# Patient Record
Sex: FEMALE | Race: WHITE | HISPANIC OR LATINO | Employment: OTHER | URBAN - METROPOLITAN AREA
[De-identification: names, ages, dates, MRNs, and addresses within clinical notes are randomized per-mention and may not be internally consistent; named-entity substitution may affect disease eponyms.]

---

## 2017-04-05 RX ORDER — LEVOTHYROXINE SODIUM 0.05 MG/1
TABLET ORAL
Qty: 90 TAB | Refills: 1 | Status: SHIPPED | OUTPATIENT
Start: 2017-04-05 | End: 2017-09-26 | Stop reason: SDUPTHER

## 2017-04-05 NOTE — TELEPHONE ENCOUNTER
Was the patient seen in the last year in this department? Yes     Does patient have an active prescription for medications requested? No     Received Request Via: Pharmacy      Pt met protocol?: Yes pt last ov 10/2016 last TSH 8/2016

## 2017-04-13 ENCOUNTER — HOSPITAL ENCOUNTER (OUTPATIENT)
Dept: LAB | Facility: MEDICAL CENTER | Age: 77
End: 2017-04-13
Attending: NURSE PRACTITIONER
Payer: MEDICARE

## 2017-04-13 ENCOUNTER — OFFICE VISIT (OUTPATIENT)
Dept: MEDICAL GROUP | Facility: PHYSICIAN GROUP | Age: 77
End: 2017-04-13
Payer: MEDICARE

## 2017-04-13 VITALS
TEMPERATURE: 98.9 F | OXYGEN SATURATION: 96 % | BODY MASS INDEX: 41.43 KG/M2 | HEART RATE: 90 BPM | RESPIRATION RATE: 16 BRPM | WEIGHT: 211 LBS | SYSTOLIC BLOOD PRESSURE: 128 MMHG | HEIGHT: 60 IN | DIASTOLIC BLOOD PRESSURE: 84 MMHG

## 2017-04-13 DIAGNOSIS — E03.9 ACQUIRED HYPOTHYROIDISM: ICD-10-CM

## 2017-04-13 DIAGNOSIS — R87.619 ABNORMAL CERVICAL PAPANICOLAOU SMEAR, UNSPECIFIED ABNORMAL PAP FINDING: ICD-10-CM

## 2017-04-13 DIAGNOSIS — E87.1 HYPONATREMIA: ICD-10-CM

## 2017-04-13 DIAGNOSIS — Z23 NEED FOR VACCINATION: ICD-10-CM

## 2017-04-13 DIAGNOSIS — I10 ESSENTIAL HYPERTENSION: ICD-10-CM

## 2017-04-13 DIAGNOSIS — N18.30 CKD (CHRONIC KIDNEY DISEASE), STAGE III (HCC): ICD-10-CM

## 2017-04-13 DIAGNOSIS — E66.01 OBESITY, MORBID, BMI 40.0-49.9 (HCC): ICD-10-CM

## 2017-04-13 LAB — TSH SERPL DL<=0.005 MIU/L-ACNC: 2.01 UIU/ML (ref 0.3–3.7)

## 2017-04-13 PROCEDURE — 90736 HZV VACCINE LIVE SUBQ: CPT | Performed by: NURSE PRACTITIONER

## 2017-04-13 PROCEDURE — 90471 IMMUNIZATION ADMIN: CPT | Performed by: NURSE PRACTITIONER

## 2017-04-13 PROCEDURE — 1036F TOBACCO NON-USER: CPT | Performed by: NURSE PRACTITIONER

## 2017-04-13 PROCEDURE — 3014F SCREEN MAMMO DOC REV: CPT | Performed by: NURSE PRACTITIONER

## 2017-04-13 PROCEDURE — 4040F PNEUMOC VAC/ADMIN/RCVD: CPT | Performed by: NURSE PRACTITIONER

## 2017-04-13 PROCEDURE — 1101F PT FALLS ASSESS-DOCD LE1/YR: CPT | Performed by: NURSE PRACTITIONER

## 2017-04-13 PROCEDURE — G8419 CALC BMI OUT NRM PARAM NOF/U: HCPCS | Performed by: NURSE PRACTITIONER

## 2017-04-13 PROCEDURE — G8432 DEP SCR NOT DOC, RNG: HCPCS | Performed by: NURSE PRACTITIONER

## 2017-04-13 PROCEDURE — 36415 COLL VENOUS BLD VENIPUNCTURE: CPT

## 2017-04-13 PROCEDURE — 99214 OFFICE O/P EST MOD 30 MIN: CPT | Mod: 25 | Performed by: NURSE PRACTITIONER

## 2017-04-13 PROCEDURE — 84443 ASSAY THYROID STIM HORMONE: CPT

## 2017-04-13 RX ORDER — LOSARTAN POTASSIUM 100 MG/1
100 TABLET ORAL DAILY
Qty: 90 TAB | Refills: 1 | Status: SHIPPED | OUTPATIENT
Start: 2017-04-13 | End: 2017-11-30 | Stop reason: SDUPTHER

## 2017-04-13 NOTE — PROGRESS NOTES
Subjective:     Chief Complaint   Patient presents with   • Hypertension     follow up   • Chronic Care Management        Jammie Berry is a 76 y.o. female here today for evaluation and management of:    Abnormal Pap smear of cervix  Referred to gynecology and then Dr. Franco for further evaluation, no need for follow up. Waiting for consult notes from office.      Obesity, morbid, BMI 40.0-49.9 (CMS-[facility])  3/27/15 BMI 40.65, she was referred to nutrition services and has changed dietary habits and increased physical activity. Rides exercise bike 35 min in am and 15-20 in the afternoon. She stopped regular soda and beer. Rare dosa, every two weeks.  BMI 2.25.16 is 37.98  BMI 4.13.17 is 41.21      CKD (chronic kidney disease), stage III  Followed by Dr. Mae, avoids NSAIDs. Drinks water and Gatorade as advised.  Next appointment June 2017.      Essential hypertension  Diagnosed in 2014, managed with Losartan 100 mg daily.   Home monitoring daily, no elevated readings. Recent measurements: Low 112/68 - 126/82  No NSAID's    No symptoms low BP: light-headed, tunnel-vision, unusual fatigue.   No symptoms high BP: pounding headache, visual changes, palpitations, flushed face. No medicine adverse effects: unusual fatigue, slow heartbeat, cough.     Hyponatremia  She was drinking too much water, now drinking some water (16 oz) Gatorade, apple juice, coffee, and tea. Avoids adding salt to foods. Sodium level has returned to normal range and has remained in range recently. Sodium level in range now.  Followed by nephrology.        Acquired hypothyroidism  Diagnosed 2014. Managed with levothyroxine 50 mcg since.  Last TSH 1.24 in August 2016.  Taking medication alone first am.    No temperature intolerance. No change hair/skin quality, BMs.   No tremors, weakness.  No peripheral swelling.  No mood changes.           ROS   Denies HA, chest pain, shortness of breath, abdominal pain, bladder or bowel changes, lower extremity  edema.    Current medicines (including changes today)  Current Outpatient Prescriptions   Medication Sig Dispense Refill   • losartan (COZAAR) 100 MG Tab Take 1 Tab by mouth every day. 90 Tab 1   • levothyroxine (SYNTHROID) 50 MCG Tab TAKE 1 TABLET BY MOUTH EVERY DAY 90 Tab 1   • Probiotic Product (PROBIOTIC PO) Take  by mouth.     • Cholecalciferol (VITAMIN D-3 PO) Take 1,000 Units by mouth every day.     • therapeutic multivitamin-minerals (THERAGRAN-M) TABS Take 1 Tab by mouth every day.       No current facility-administered medications for this visit.       She  has a past medical history of Abnormal Pap smear of cervix; Other specified hypothyroidism (9/3/2015); CKD (chronic kidney disease), stage III (9/4/2015); Essential hypertension (9/4/2015); and Tuberculosis. She also has no past medical history of Headache(784.0), Ulcer (CMS-[facility]), or Urinary tract infection, site not specified.    Allergies Review of patient's allergies indicates no known allergies.    Current medications, problem list, allergies, past medical history, and tobacco use history reviewed in EPIC today.    Health maintenance reviewed and updated.    Objective:   Blood pressure 128/84, pulse 90, temperature 37.2 °C (98.9 °F), resp. rate 16, height 1.524 m (5'), weight 95.709 kg (211 lb), SpO2 96 %. Body mass index is 41.21 kg/(m^2).     Physical Exam   Constitutional: Alert, no acute distress. Pleasant and cooperative with the examination.  Skin:   Warm, dry, no rashes in visible areas.    Eyes:   Pupils equal, round. Conjunctiva and sclera clear,    Lids normal.  ENT:  Pinna normal.   Neck:   Supple, trachea midline.  Lungs:  Normal effort and respirations. Clear to auscultation bilaterally.  CV:  Regular rate and rhythm.  MS/Ext:  Steady gait, no edema.  Psych:  Eye contact is good, affect calm.    Assessment and Plan:   The following treatment plan was discussed    1. Essential hypertension  Stable. Continue current medicines. Monitor  labs regularly.        2. Acquired hypothyroidism  Stable. Continue current medicines. Monitor labs regularly.    lab visit today.   - TSH; Future    3. CKD (chronic kidney disease), stage III  Stable. Continue current medicines. Monitor labs regularly. Follow-up with specialist as directed.    4. Hyponatremia  Resolved, continue recommended fluids . Will continue to follow up with specialist.    5. Obesity, morbid, BMI 40.0-49.9 (CMS-[facility])    - Patient identified as having weight management issue.  Appropriate orders and counseling given.    6. Abnormal cervical Papanicolaou smear, unspecified abnormal pap finding  Records requested.    7. Need for vaccination  Given in office today.  - VARICELLA ZOSTER VACCINE SQ  The patient was counseled on the requirements, possible side effects, and future requirements of immunizations for today, including all of the components of combination vaccines.  The vaccinations were approved to be given. Immunizations given by MA after completion of vaccine screening checklist, under the direction of JAZMIN Ortiz.    Followup: Return in about 6 months (around 10/13/2017) for htn, thyroid.  As scheduled, sooner if symptoms don't resolve or with any new problems.         Reviewed side effects, risks, and benefits of medications prescribed today.  Advised to take all medications as instructed and report any side effects.   The patient voices understanding and agrees.  Report any new or worsening symptoms.  Have labs or other diagnostic studies prior to follow up.  Keep all appointments for any referrals given.      Please note this dictation was created using voice recognition software. Every reasonable attempt has been made to correct obvious errors, however there may be errors of grammar and possibly content that were not discovered before finalizing the note.

## 2017-04-13 NOTE — MR AVS SNAPSHOT
Jammie Berry   2017 10:15 AM   Office Visit   MRN: [id number]    Department:  [facility]   Dept Phone:  [phone]    Description:  Female : 1940   Provider:  NORA Hayden           Reason for Visit     Hypertension follow up    Chronic Care Management           Allergies as of 2017     No Known Allergies      You were diagnosed with     Essential hypertension   [7032143]       Acquired hypothyroidism   [7465562]       CKD (chronic kidney disease), stage III   [026016]       Hyponatremia   [230410]       Obesity, morbid, BMI 40.0-49.9 (CMS-[facility])   [334501]       Abnormal cervical Papanicolaou smear, unspecified abnormal pap finding   [5080095]       Need for vaccination   [351926]         Vital Signs     Blood Pressure Pulse Temperature Respirations Height Weight    128/84 mmHg 90 37.2 °C (98.9 °F) 16 1.524 m (5') 95.709 kg (211 lb)    Body Mass Index Oxygen Saturation Smoking Status             41.21 kg/m2 96% Former Smoker         Basic Information     Date Of Birth Sex Race Ethnicity Preferred Language    1940 Female Other  Origin (Khmer,Solomon Islander,Latvian,Manish, etc) English      Your appointments     2017  9:30 AM   Follow Up Visit with She Mae M.D.   [facility] ([address] [place] [address])    [facility] [address]  [place] [address] [phone]           You will be receiving a confirmation call a few days before your appointment from our automated call confirmation system.            Oct 16, 2017 10:15 AM   Established Patient with NORA Hayden   [facility]    [address], [address]  [place] [address] [phone]           You will be receiving a confirmation call a few days before your appointment from our automated call confirmation system.              Problem List              ICD-10-CM Priority Class Noted - Resolved    Abnormal Pap smear of cervix R87.619   7/2/2014 - Present    Obesity, morbid, BMI 40.0-49.9 (CMS-[facility]) E66.01 High  4/17/2015 - Present    Acquired hypothyroidism E03.9   9/3/2015 - Present    CKD (chronic kidney disease), stage III N18.3   9/4/2015 - Present    Essential hypertension I10   9/4/2015 - Present    Hyponatremia E87.1   9/4/2015 - Present    Vitamin D deficiency disease E55.9   2/11/2016 - Present      Health Maintenance        Date Due Completion Dates    MAMMOGRAM 7/14/2017 7/14/2016, 7/13/2015, 7/11/2014    BONE DENSITY 7/11/2019 7/11/2014    COLONOSCOPY 10/12/2025 10/12/2015    IMM DTaP/Tdap/Td Vaccine (2 - Td) 8/29/2026 8/29/2016            Current Immunizations     13-VALENT PCV PREVNAR 10/12/2015    Influenza Vaccine Adult HD 10/13/2016, 10/12/2015    Pneumococcal polysaccharide vaccine (PPSV-23) 10/13/2016    SHINGLES VACCINE 4/13/2017    Tdap Vaccine 8/29/2016      Below and/or attached are the medications your provider expects you to take. Review all of your home medications and newly ordered medications with your provider and/or pharmacist. Follow medication instructions as directed by your provider and/or pharmacist. Please keep your medication list with you and share with your provider. Update the information when medications are discontinued, doses are changed, or new medications (including over-the-counter products) are added; and carry medication information at all times in the event of emergency situations     Allergies:  No Known Allergies          Medications  Valid as of: April 13, 2017 - 11:03 AM    Generic Name Brand Name Tablet Size Instructions for use    Cholecalciferol   Take 1,000 Units by mouth every day.        Levothyroxine Sodium (Tab) SYNTHROID 50 MCG TAKE 1 TABLET BY MOUTH EVERY DAY        Losartan Potassium (Tab) COZAAR 100 MG Take 1 Tab by mouth every day.        Multiple Vitamins-Minerals (Tab) THERAGRAN-M  Take 1 Tab by mouth every day.        Probiotic Product   Take   by mouth.        .                 Medicines prescribed today were sent to:     [facility]/PHARMACY #[id number] - [place], [place] - [address]    [address] [place] [address]    Phone: [phone] Fax: [phone]    Open 24 Hours?: No      Medication refill instructions:       If your prescription bottle indicates you have medication refills left, it is not necessary to call your provider’s office. Please contact your pharmacy and they will refill your medication.    If your prescription bottle indicates you do not have any refills left, you may request refills at any time through one of the following ways: The online [employer] system (except Urgent Care), by calling your provider’s office, or by asking your pharmacy to contact your provider’s office with a refill request. Medication refills are processed only during regular business hours and may not be available until the next business day. Your provider may request additional information or to have a follow-up visit with you prior to refilling your medication.   *Please Note: Medication refills are assigned a new Rx number when refilled electronically. Your pharmacy may indicate that no refills were authorized even though a new prescription for the same medication is available at the pharmacy. Please request the medicine by name with the pharmacy before contacting your provider for a refill.        Your To Do List     Future Labs/Procedures Complete By Expires    TSH  As directed 4/14/2018         [employer] Status: Patient Declined

## 2017-04-13 NOTE — ASSESSMENT & PLAN NOTE
Diagnosed in 2014, managed with Losartan 100 mg daily.   Home monitoring daily, no elevated readings. Recent measurements: Low 112/68 - 126/82  No NSAID's    No symptoms low BP: light-headed, tunnel-vision, unusual fatigue.   No symptoms high BP: pounding headache, visual changes, palpitations, flushed face. No medicine adverse effects: unusual fatigue, slow heartbeat, cough.

## 2017-04-13 NOTE — ASSESSMENT & PLAN NOTE
She was drinking too much water, now drinking some water (16 oz) Gatorade, apple juice, coffee, and tea. Avoids adding salt to foods. Sodium level has returned to normal range and has remained in range recently. Sodium level in range now.  Followed by nephrology.

## 2017-04-13 NOTE — ASSESSMENT & PLAN NOTE
Referred to gynecology and then Dr. Franco for further evaluation, no need for follow up. Waiting for consult notes from office.

## 2017-04-13 NOTE — ASSESSMENT & PLAN NOTE
Followed by Dr. Mae, avoids NSAIDs. Drinks water and Gatorade as advised.  Next appointment June 2017.

## 2017-04-13 NOTE — ASSESSMENT & PLAN NOTE
3/27/15 BMI 40.65, she was referred to nutrition services and has changed dietary habits and increased physical activity. Rides exercise bike 35 min in am and 15-20 in the afternoon. She stopped regular soda and beer. Rare dosa, every two weeks.  BMI 2.25.16 is 37.98  BMI 4.13.17 is 41.21

## 2017-04-13 NOTE — ASSESSMENT & PLAN NOTE
Diagnosed 2014. Managed with levothyroxine 50 mcg since.  Last TSH 1.24 in August 2016.  Taking medication alone first am.    No temperature intolerance. No change hair/skin quality, BMs.   No tremors, weakness.  No peripheral swelling.  No mood changes.

## 2017-06-12 ENCOUNTER — HOSPITAL ENCOUNTER (OUTPATIENT)
Dept: LAB | Facility: MEDICAL CENTER | Age: 77
End: 2017-06-12
Attending: INTERNAL MEDICINE
Payer: MEDICARE

## 2017-06-12 DIAGNOSIS — E87.1 HYPONATREMIA: ICD-10-CM

## 2017-06-12 DIAGNOSIS — D64.9 ANEMIA, UNSPECIFIED TYPE: ICD-10-CM

## 2017-06-12 DIAGNOSIS — N18.30 CKD (CHRONIC KIDNEY DISEASE), STAGE III (HCC): ICD-10-CM

## 2017-06-12 LAB
25(OH)D3 SERPL-MCNC: 37 NG/ML (ref 30–100)
ANION GAP SERPL CALC-SCNC: 7 MMOL/L (ref 0–11.9)
APPEARANCE UR: CLEAR
BILIRUB UR QL STRIP.AUTO: NEGATIVE
BUN SERPL-MCNC: 18 MG/DL (ref 8–22)
CALCIUM SERPL-MCNC: 9.2 MG/DL (ref 8.5–10.5)
CHLORIDE SERPL-SCNC: 105 MMOL/L (ref 96–112)
CO2 SERPL-SCNC: 24 MMOL/L (ref 20–33)
COLOR UR: COLORLESS
CREAT SERPL-MCNC: 0.99 MG/DL (ref 0.5–1.4)
ERYTHROCYTE [DISTWIDTH] IN BLOOD BY AUTOMATED COUNT: 45.1 FL (ref 35.9–50)
GFR SERPL CREATININE-BSD FRML MDRD: 55 ML/MIN/1.73 M 2
GLUCOSE SERPL-MCNC: 97 MG/DL (ref 65–99)
GLUCOSE UR STRIP.AUTO-MCNC: NEGATIVE MG/DL
HCT VFR BLD AUTO: 43.3 % (ref 37–47)
HGB BLD-MCNC: 13.9 G/DL (ref 12–16)
KETONES UR STRIP.AUTO-MCNC: NEGATIVE MG/DL
LEUKOCYTE ESTERASE UR QL STRIP.AUTO: NEGATIVE
MCH RBC QN AUTO: 30 PG (ref 27–33)
MCHC RBC AUTO-ENTMCNC: 32.1 G/DL (ref 33.6–35)
MCV RBC AUTO: 93.5 FL (ref 81.4–97.8)
MICRO URNS: NORMAL
NITRITE UR QL STRIP.AUTO: NEGATIVE
PH UR STRIP.AUTO: 6 [PH]
PLATELET # BLD AUTO: 292 K/UL (ref 164–446)
PMV BLD AUTO: 9.6 FL (ref 9–12.9)
POTASSIUM SERPL-SCNC: 4.4 MMOL/L (ref 3.6–5.5)
PROT UR QL STRIP: NEGATIVE MG/DL
RBC # BLD AUTO: 4.63 M/UL (ref 4.2–5.4)
RBC UR QL AUTO: NEGATIVE
SODIUM SERPL-SCNC: 136 MMOL/L (ref 135–145)
SP GR UR STRIP.AUTO: 1.01
WBC # BLD AUTO: 8.7 K/UL (ref 4.8–10.8)

## 2017-06-12 PROCEDURE — 36415 COLL VENOUS BLD VENIPUNCTURE: CPT

## 2017-06-12 PROCEDURE — 81003 URINALYSIS AUTO W/O SCOPE: CPT

## 2017-06-12 PROCEDURE — 80048 BASIC METABOLIC PNL TOTAL CA: CPT

## 2017-06-12 PROCEDURE — 82306 VITAMIN D 25 HYDROXY: CPT

## 2017-06-12 PROCEDURE — 85027 COMPLETE CBC AUTOMATED: CPT

## 2017-06-21 ENCOUNTER — OFFICE VISIT (OUTPATIENT)
Dept: NEPHROLOGY | Facility: MEDICAL CENTER | Age: 77
End: 2017-06-21
Payer: MEDICARE

## 2017-06-21 VITALS
DIASTOLIC BLOOD PRESSURE: 70 MMHG | WEIGHT: 211 LBS | SYSTOLIC BLOOD PRESSURE: 120 MMHG | RESPIRATION RATE: 16 BRPM | BODY MASS INDEX: 41.43 KG/M2 | TEMPERATURE: 98.6 F | OXYGEN SATURATION: 95 % | HEIGHT: 60 IN | HEART RATE: 81 BPM

## 2017-06-21 DIAGNOSIS — N18.30 CKD (CHRONIC KIDNEY DISEASE), STAGE III (HCC): ICD-10-CM

## 2017-06-21 DIAGNOSIS — E87.1 HYPONATREMIA: ICD-10-CM

## 2017-06-21 DIAGNOSIS — I10 ESSENTIAL HYPERTENSION: ICD-10-CM

## 2017-06-21 DIAGNOSIS — E55.9 VITAMIN D DEFICIENCY DISEASE: ICD-10-CM

## 2017-06-21 PROCEDURE — 99214 OFFICE O/P EST MOD 30 MIN: CPT | Performed by: INTERNAL MEDICINE

## 2017-06-21 RX ORDER — CHOLECALCIFEROL (VITAMIN D3) 125 MCG
500 CAPSULE ORAL DAILY
COMMUNITY

## 2017-06-21 NOTE — PROGRESS NOTES
Subjective:      Jammie Berry is a 76 y.o. female who presents for f/u CKD III, hyponatremia          HPI  Jammie is coming today for f/u of CKD III, HTN, hyponatremia  Doing well, no complaints except chronic back pain  -off NSAID's  No difficulties to urinate, no dysuria, no hematuria.  HTN: compliant to medications -BP well controlled  CKD III:Serum creatinine level improving -now at 0.99  Hyponatremia - well controlled    Review of Systems   Constitutional:Negative for fever, chills and malaise/fatigue.   Respiratory: Negative for cough, hemoptysis, shortness of breath and wheezing.    Cardiovascular: Negative for chest pain, palpitations, orthopnea and leg swelling.   Gastrointestinal: Negative for nausea, vomiting and abdominal pain.   Genitourinary: Negative for dysuria, urgency, frequency, hematuria and flank pain.   Musculoskeletal: Positive for back pain. Negative for myalgias and neck pain.   Neurological: Negative for dizziness, sensory change, focal weakness and headaches.     PMH/FH/SH/allergies and medications reviewed     Objective:     /70 mmHg  Pulse 81  Temp(Src) 37 °C (98.6 °F)  Resp 16  Ht 1.524 m (5')  Wt 95.709 kg (211 lb)  BMI 41.21 kg/m2  SpO2 95%     Physical Exam   Constitutional: She is oriented to person, place, and time. She appears well-developed and well-nourished. No distress.   HENT:   Head: Normocephalic and atraumatic.   Nose: Nose normal.   Mouth/Throat: Oropharynx is clear and moist.   Eyes: Conjunctivae and EOM are normal. Pupils are equal, round, and reactive to light.   Neck: Normal range of motion. Neck supple.   Cardiovascular: Normal rate and regular rhythm.  Exam reveals no gallop and no friction rub.    Pulmonary/Chest: Effort normal and breath sounds normal. She has no wheezes. She has no rales.   Abdominal: Soft. Bowel sounds are normal. She exhibits no distension. There is no tenderness.   Musculoskeletal: She exhibits no edema.   Neurological: She  is alert and oriented to person, place, and time. No cranial nerve deficit. Coordination normal.   Skin: Skin is warm. No rash noted. No erythema.             Laboratory results reviewed: d/w pt    Lab Results   Component Value Date/Time    CREATININE 0.99 06/12/2017 06:28 AM    POTASSIUM 4.4 06/12/2017 06:28 AM         Assessment/Plan:     1. CKD (chronic kidney disease), stage III      Creatinine level improving -to monitor       2. Essential hypertension      BP well controlled    3. Hyponatremia      Well controlled    4. Vit D def; well controlled  - WNL    Recs: continue current treatment             Avoid NSAID's             F/u in 6 months with BMP

## 2017-06-21 NOTE — MR AVS SNAPSHOT
Jammie Berry   2017 9:30 AM   Office Visit   MRN: [id number]    Department:  [facility]   Dept Phone:  [phone]    Description:  Female : 1940   Provider:  She Mae M.D.           Reason for Visit     Follow-Up           Allergies as of 2017     No Known Allergies      You were diagnosed with     CKD (chronic kidney disease), stage III   [860966]       Essential hypertension   [1339787]       Hyponatremia   [635379]       Vitamin D deficiency disease   [647635]         Vital Signs     Blood Pressure Pulse Temperature Respirations Height Weight    120/70 mmHg 81 37 °C (98.6 °F) 16 1.524 m (5') 95.709 kg (211 lb)    Body Mass Index Oxygen Saturation Smoking Status             41.21 kg/m2 95% Former Smoker         Basic Information     Date Of Birth Sex Race Ethnicity Preferred Language    1940 Female Other  Origin (French,North Korean,Hong Konger,Cypriot, etc) English      Your appointments     Oct 16, 2017 10:15 AM   Established Patient with NORA Hayden   [facility]    [address], [address]  [place] [address] [phone]           You will be receiving a confirmation call a few days before your appointment from our automated call confirmation system.            Dec 14, 2017  9:30 AM   Follow Up Visit with She Mae M.D.   [facility] ([address] [place] [address])    [facility] [address]  [place] [address] [phone]           You will be receiving a confirmation call a few days before your appointment from our automated call confirmation system.              Problem List              ICD-10-CM Priority Class Noted - Resolved    Abnormal Pap smear of cervix R87.619   2014 - Present    Obesity, morbid, BMI 40.0-49.9 (CMS-[facility]) E66.01 High  2015 - Present    Acquired hypothyroidism E03.9   9/3/2015 - Present    CKD (chronic kidney disease), stage III N18.3    9/4/2015 - Present    Essential hypertension I10   9/4/2015 - Present    Hyponatremia E87.1   9/4/2015 - Present    Vitamin D deficiency disease E55.9   2/11/2016 - Present      Health Maintenance        Date Due Completion Dates    MAMMOGRAM 7/14/2017 7/14/2016, 7/13/2015, 7/11/2014    BONE DENSITY 7/11/2019 7/11/2014    COLONOSCOPY 10/12/2025 10/12/2015    IMM DTaP/Tdap/Td Vaccine (2 - Td) 8/29/2026 8/29/2016            Current Immunizations     13-VALENT PCV PREVNAR 10/12/2015    Influenza Vaccine Adult HD 10/13/2016, 10/12/2015    Pneumococcal polysaccharide vaccine (PPSV-23) 10/13/2016    SHINGLES VACCINE 4/13/2017    Tdap Vaccine 8/29/2016      Below and/or attached are the medications your provider expects you to take. Review all of your home medications and newly ordered medications with your provider and/or pharmacist. Follow medication instructions as directed by your provider and/or pharmacist. Please keep your medication list with you and share with your provider. Update the information when medications are discontinued, doses are changed, or new medications (including over-the-counter products) are added; and carry medication information at all times in the event of emergency situations     Allergies:  No Known Allergies          Medications  Valid as of: June 21, 2017 -  9:43 AM    Generic Name Brand Name Tablet Size Instructions for use    Cholecalciferol   Take 1,000 Units by mouth every day.        Cyanocobalamin (Tab) VITAMIN B-12 500 MCG Take 500 mcg by mouth every day.        Levothyroxine Sodium (Tab) SYNTHROID 50 MCG TAKE 1 TABLET BY MOUTH EVERY DAY        Losartan Potassium (Tab) COZAAR 100 MG Take 1 Tab by mouth every day.        Misc Natural Products   Take  by mouth.        Multiple Vitamins-Minerals (Tab) THERAGRAN-M  Take 1 Tab by mouth every day.        Probiotic Product   Take  by mouth.        .                 Medicines prescribed today were sent to:     [facility]/PHARMACY #[id number] - [place], [place] - [address]    [address] [address]    Phone: [phone] Fax: [phone]    Open 24 Hours?: No      Medication refill instructions:       If your prescription bottle indicates you have medication refills left, it is not necessary to call your provider’s office. Please contact your pharmacy and they will refill your medication.    If your prescription bottle indicates you do not have any refills left, you may request refills at any time through one of the following ways: The online [employer] system (except Urgent Care), by calling your provider’s office, or by asking your pharmacy to contact your provider’s office with a refill request. Medication refills are processed only during regular business hours and may not be available until the next business day. Your provider may request additional information or to have a follow-up visit with you prior to refilling your medication.   *Please Note: Medication refills are assigned a new Rx number when refilled electronically. Your pharmacy may indicate that no refills were authorized even though a new prescription for the same medication is available at the pharmacy. Please request the medicine by name with the pharmacy before contacting your provider for a refill.        Your To Do List     Future Labs/Procedures Complete By Expires    BASIC METABOLIC PANEL  As directed 12/19/2017         LYFE Kitchen Access Code: [email or web address]  Expires: 7/21/2017  9:43 AM    LYFE Kitchen  A secure, online tool to manage your health information     [employer]’s LYFE Kitchen® is a secure, online tool that connects you to your personalized health information from the privacy of your home -- day or night - making it very easy for you to manage your healthcare. Once the activation process is completed, you can even access your medical information using the LYFE Kitchen rasheeda, which is available for free in the Apple Rasheeda store or Google Play store.     LYFE Kitchen  provides the following levels of access (as shown below):   My Chart Features   [facility] Primary [facility] Doctor [facility]  Specialists [facility]  Urgent  Care Non-[facility]  Primary Care  Doctor   Email your healthcare team securely and privately 24/7 X X X    Manage appointments: schedule your next appointment; view details of past/upcoming appointments X      Request prescription refills. X      View recent personal medical records, including lab and immunizations X X X X   View health record, including health history, allergies, medications X X X X   Read reports about your outpatient visits, procedures, consult and ER notes X X X X   See your discharge summary, which is a recap of your hospital and/or ER visit that includes your diagnosis, lab results, and care plan. X X       How to register for Lightspeed:  1. Go to  [email or web address].  2. Click on the Sign Up Now box, which takes you to the New Member Sign Up page. You will need to provide the following information:  a. Enter your Lightspeed Access Code exactly as it appears at the top of this page. (You will not need to use this code after you’ve completed the sign-up process. If you do not sign up before the expiration date, you must request a new code.)   b. Enter your date of birth.   c. Enter your home email address.   d. Click Submit, and follow the next screen’s instructions.  3. Create a Lightspeed ID. This will be your Lightspeed login ID and cannot be changed, so think of one that is secure and easy to remember.  4. Create a Lightspeed password. You can change your password at any time.  5. Enter your Password Reset Question and Answer. This can be used at a later time if you forget your password.   6. Enter your e-mail address. This allows you to receive e-mail notifications when new information is available in Lightspeed.  7. Click Sign Up. You can now view your health information.    For assistance activating your Lightspeed account, call [phone]

## 2017-06-23 ENCOUNTER — TELEPHONE (OUTPATIENT)
Dept: MEDICAL GROUP | Facility: PHYSICIAN GROUP | Age: 77
End: 2017-06-23

## 2017-06-23 NOTE — TELEPHONE ENCOUNTER
ESTABLISHED PATIENT PRE-VISIT PLANNING     Note: Patient will not be contacted if there is no indication to call.     1.  Reviewed notes from the last few office visits within the medical group: Yes    2.  If any orders were placed at last visit or intended to be done for this visit (i.e. 6 mos follow-up), do we have Results/Consult Notes?        •  Labs - Labs ordered, completed and results are in chart.       •  Imaging - Imaging was not ordered at last office visit.       •  Referrals - No referrals were ordered at last office visit.    3. Is this appointment scheduled as a Hospital Follow-Up? No    4.  Immunizations were updated in [employer] using WebIZ?: Yes       •  Web Iz Recommendations: Patient is up to date on all vaccines    5.  Patient is due for the following Health Maintenance Topics:   There are no preventive care reminders to display for this patient.    - Patient is up-to-date on all Health Maintenance topics. No records have been requested at this time.    6.  Patient was NOT informed to arrive 15 min prior to their scheduled appointment and bring in their medication bottles.

## 2017-06-26 ENCOUNTER — OFFICE VISIT (OUTPATIENT)
Dept: MEDICAL GROUP | Facility: PHYSICIAN GROUP | Age: 77
End: 2017-06-26
Payer: MEDICARE

## 2017-06-26 VITALS
DIASTOLIC BLOOD PRESSURE: 68 MMHG | BODY MASS INDEX: 42.21 KG/M2 | TEMPERATURE: 97.9 F | HEIGHT: 60 IN | SYSTOLIC BLOOD PRESSURE: 122 MMHG | RESPIRATION RATE: 16 BRPM | HEART RATE: 86 BPM | OXYGEN SATURATION: 97 % | WEIGHT: 215 LBS

## 2017-06-26 DIAGNOSIS — E03.9 ACQUIRED HYPOTHYROIDISM: ICD-10-CM

## 2017-06-26 DIAGNOSIS — E66.01 OBESITY, MORBID, BMI 40.0-49.9 (HCC): ICD-10-CM

## 2017-06-26 DIAGNOSIS — N18.30 CKD (CHRONIC KIDNEY DISEASE), STAGE III (HCC): ICD-10-CM

## 2017-06-26 DIAGNOSIS — R87.619 ABNORMAL CERVICAL PAPANICOLAOU SMEAR, UNSPECIFIED ABNORMAL PAP FINDING: ICD-10-CM

## 2017-06-26 DIAGNOSIS — I10 ESSENTIAL HYPERTENSION: ICD-10-CM

## 2017-06-26 PROCEDURE — 99214 OFFICE O/P EST MOD 30 MIN: CPT | Performed by: NURSE PRACTITIONER

## 2017-06-26 RX ORDER — LOSARTAN POTASSIUM 100 MG/1
TABLET ORAL
Refills: 1 | OUTPATIENT
Start: 2017-06-26

## 2017-06-26 NOTE — ASSESSMENT & PLAN NOTE
Referred to gynecology and then Dr. Franco for further evaluation, told her she was ok, no need for follow up. Waiting for consult notes from office. Not received yet 6.26.17

## 2017-06-26 NOTE — ASSESSMENT & PLAN NOTE
3/27/15 BMI 40.65, she was referred to nutrition services and has changed dietary habits and increased physical activity. Rides exercise bike 35 min in am and 15-20 in the afternoon. She stopped regular soda and beer, no alcohol for 2 years. Rare soda, every two-three weeks.  BMI 2.25.16 is 37.98  BMI 4.13.17 is 41.21  Feeling good, still working out 35-40 minutes in the morning and 15-20 in the afternoon on her stationary bike at home. Not following the healthy eating plan like she was when she lost weight.

## 2017-06-26 NOTE — TELEPHONE ENCOUNTER
CALLED [facility] 06/26/17 AND CONFIRMED PT STILL HAS REFILLS ON FILE. WARD

## 2017-06-26 NOTE — PROGRESS NOTES
Subjective:     Chief Complaint   Patient presents with   • Chronic Care Management     follow up         Jammie Berry is a 76 y.o. female here today for evaluation and management of:    Obesity, morbid, BMI 40.0-49.9 (CMS-[facility])  3/27/15 BMI 40.65, she was referred to nutrition services and has changed dietary habits and increased physical activity. Rides exercise bike 35 min in am and 15-20 in the afternoon. She stopped regular soda and beer, no alcohol for 2 years. Rare soda, every two-three weeks.  BMI 2.25.16 is 37.98  BMI 4.13.17 is 41.21  Feeling good, still working out 35-40 minutes in the morning and 15-20 in the afternoon on her stationary bike at home. Not following the healthy eating plan like she was when she lost weight.      Acquired hypothyroidism  Diagnosed 2014. Managed with levothyroxine 50 mcg since.  Last TSH 1.24 in August 2016. 3.17 TSH level 2.01   taking daily early in am, before any po intake.  No temperature intolerance. No change in weight,  hair/skin quality, BMs.   No tremors, weakness.  No peripheral swelling.  No mood changes.        Abnormal Pap smear of cervix  Referred to gynecology and then Dr. Franco for further evaluation, told her she was ok, no need for follow up. Waiting for consult notes from office. Not received yet 6.26.17      CKD (chronic kidney disease), stage III  Followed by Dr. Mae, avoids NSAIDs. Drinks water and Gatorade as advised.  Last appointment June 2017.           ROS   Denies HA, chest pain, shortness of breath, abdominal pain, bladder or bowel changes, lower extremity edema.    Current medicines (including changes today)  Current Outpatient Prescriptions   Medication Sig Dispense Refill   • cyanocobalamin (VITAMIN B-12) 500 MCG Tab Take 500 mcg by mouth every day.     • Misc Natural Products (OSTEO BI-FLEX ADV DOUBLE ST PO) Take  by mouth.     • losartan (COZAAR) 100 MG Tab Take 1 Tab by mouth every day. 90 Tab 1   • levothyroxine (SYNTHROID) 50 MCG Tab  TAKE 1 TABLET BY MOUTH EVERY DAY 90 Tab 1   • Probiotic Product (PROBIOTIC PO) Take  by mouth.     • Cholecalciferol (VITAMIN D-3 PO) Take 1,000 Units by mouth every day.     • therapeutic multivitamin-minerals (THERAGRAN-M) TABS Take 1 Tab by mouth every day.       No current facility-administered medications for this visit.       She  has a past medical history of Abnormal Pap smear of cervix; Other specified hypothyroidism (9/3/2015); CKD (chronic kidney disease), stage III (9/4/2015); Essential hypertension (9/4/2015); and Tuberculosis. She also has no past medical history of Headache, Ulcer (CMS-HCC), or Urinary tract infection, site not specified.    Allergies Review of patient's allergies indicates no known allergies.    Current medications, problem list, allergies, past medical history, and tobacco use history reviewed in [facility] today.    Health maintenance reviewed and updated. Mamm scheduled   Objective:   Blood pressure 122/68, pulse 86, temperature 36.6 °C (97.9 °F), resp. rate 16, height 1.524 m (5'), weight 97.523 kg (215 lb), SpO2 97 %. Body mass index is 41.99 kg/(m^2).     Physical Exam   Constitutional: Alert, no acute distress. Pleasant and cooperative with the examination.  Skin:   Warm, dry, no rashes in visible areas.    Eyes:   Pupils equal, round. Conjunctiva and sclera clear,    Lids normal.  ENT:  Pinna normal.   Neck:   Supple, trachea midline.  Lungs:  Normal effort and respirations. Clear to auscultation bilaterally.  CV:  Regular rate and rhythm.  MS/Ext:  Steady gait, no edema.  Psych:  Eye contact is good, affect calm.    Assessment and Plan:   The following treatment plan was discussed    1. Obesity, morbid, BMI 40.0-49.9 (CMS-HCC)    - OBESITY COUNSELING : Patient identified as having weight management issue.  Appropriate orders and counseling given.    2. Acquired hypothyroidism  Stable. Continue current medicines. Monitor labs regularly.        3. Essential hypertension  Stable.  Continue current medicines. Monitor labs regularly.        4. HX Abnormal cervical Papanicolaou smear, unspecified abnormal pap finding  Records requested    5. CKD  Stable. Continue current medicines. Monitor labs regularly. Follow-up with specialist as directed.      Followup: Return in about 4 months (around 10/16/2017).  As scheduled, sooner if symptoms don't resolve or with any new problems.         Reviewed side effects, risks, and benefits of medications prescribed today.  Advised to take all medications as instructed and report any side effects.   The patient voices understanding and agrees.  Report any new or worsening symptoms.  Have labs or other diagnostic studies prior to follow up.  Keep all appointments for any referrals given.      Please note this dictation was created using voice recognition software. Every reasonable attempt has been made to correct obvious errors, however there may be errors of grammar and possibly content that were not discovered before finalizing the note.

## 2017-06-26 NOTE — ASSESSMENT & PLAN NOTE
Diagnosed 2014. Managed with levothyroxine 50 mcg since.  Last TSH 1.24 in August 2016. 3.17 TSH level 2.01   taking daily early in am, before any po intake.  No temperature intolerance. No change in weight,  hair/skin quality, BMs.   No tremors, weakness.  No peripheral swelling.  No mood changes.

## 2017-06-26 NOTE — MR AVS SNAPSHOT
Jammie Seamanyva   2017 1:35 PM   Office Visit   MRN: [id number]    Department:  [facility]   Dept Phone:  [phone]    Description:  Female : 1940   Provider:  NORA Hayden           Reason for Visit     Chronic Care Management follow up       Allergies as of 2017     No Known Allergies      You were diagnosed with     Obesity, morbid, BMI 40.0-49.9 (CMS-[facility])   [487914]       Acquired hypothyroidism   [5128779]       Essential hypertension   [2247997]       Abnormal cervical Papanicolaou smear, unspecified abnormal pap finding   [5455893]         Vital Signs     Blood Pressure Pulse Temperature Respirations Height Weight    122/68 mmHg 86 36.6 °C (97.9 °F) 16 1.524 m (5') 97.523 kg (215 lb)    Body Mass Index Oxygen Saturation Smoking Status             41.99 kg/m2 97% Former Smoker         Basic Information     Date Of Birth Sex Race Ethnicity Preferred Language    1940 Female Other  Origin (Lao,Ugandan,Palestinian,Manish, etc) English      Your appointments     2017  8:20 AM   MA SCRN10 with RB MG 3   [facility] BREAST [facility] ([address])    [address]  [place] [id number] [phone]           No deodorant, powder, perfume or lotion under the arm or breast area.            Oct 16, 2017 10:15 AM   Established Patient with NORA Hayden   [facility] ([place])    [address]  [place] [id number] [phone]           You will be receiving a confirmation call a few days before your appointment from our automated call confirmation system.            Dec 14, 2017  9:30 AM   Follow Up Visit with She Mae M.D.   [facility] ([address] [place] [address])    [address]  [place] [id number] [phone]           You will be receiving a confirmation call a few days before your appointment from our automated call  confirmation system.              Problem List              ICD-10-CM Priority Class Noted - Resolved    Abnormal Pap smear of cervix R87.619   7/2/2014 - Present    Obesity, morbid, BMI 40.0-49.9 (CMS-[facility]) E66.01 High  4/17/2015 - Present    Acquired hypothyroidism E03.9   9/3/2015 - Present    CKD (chronic kidney disease), stage III N18.3   9/4/2015 - Present    Essential hypertension I10   9/4/2015 - Present    Hyponatremia E87.1   9/4/2015 - Present    Vitamin D deficiency disease E55.9   2/11/2016 - Present      Health Maintenance        Date Due Completion Dates    MAMMOGRAM 7/14/2017 7/14/2016, 7/13/2015, 7/11/2014    BONE DENSITY 7/11/2019 7/11/2014    COLONOSCOPY 10/12/2025 10/12/2015    IMM DTaP/Tdap/Td Vaccine (2 - Td) 8/29/2026 8/29/2016            Current Immunizations     13-VALENT PCV PREVNAR 10/12/2015    Influenza Vaccine Adult HD 10/13/2016, 10/12/2015    Pneumococcal polysaccharide vaccine (PPSV-23) 10/13/2016    SHINGLES VACCINE 4/13/2017    Tdap Vaccine 8/29/2016      Below and/or attached are the medications your provider expects you to take. Review all of your home medications and newly ordered medications with your provider and/or pharmacist. Follow medication instructions as directed by your provider and/or pharmacist. Please keep your medication list with you and share with your provider. Update the information when medications are discontinued, doses are changed, or new medications (including over-the-counter products) are added; and carry medication information at all times in the event of emergency situations     Allergies:  No Known Allergies          Medications  Valid as of: June 26, 2017 -  2:00 PM    Generic Name Brand Name Tablet Size Instructions for use    Cholecalciferol   Take 1,000 Units by mouth every day.        Cyanocobalamin (Tab) VITAMIN B-12 500 MCG Take 500 mcg by mouth every day.        Levothyroxine Sodium (Tab) SYNTHROID 50 MCG TAKE 1 TABLET BY MOUTH EVERY DAY         Losartan Potassium (Tab) COZAAR 100 MG Take 1 Tab by mouth every day.        Misc Natural Products   Take  by mouth.        Multiple Vitamins-Minerals (Tab) THERAGRAN-M  Take 1 Tab by mouth every day.        Probiotic Product   Take  by mouth.        .                 Medicines prescribed today were sent to:     [facility] #[id number] - [place], [place] - [address]    [address] [address]    Phone: [phone] Fax: [phone]    Open 24 Hours?: No      Medication refill instructions:       If your prescription bottle indicates you have medication refills left, it is not necessary to call your provider’s office. Please contact your pharmacy and they will refill your medication.    If your prescription bottle indicates you do not have any refills left, you may request refills at any time through one of the following ways: The online [employer] system (except Urgent Care), by calling your provider’s office, or by asking your pharmacy to contact your provider’s office with a refill request. Medication refills are processed only during regular business hours and may not be available until the next business day. Your provider may request additional information or to have a follow-up visit with you prior to refilling your medication.   *Please Note: Medication refills are assigned a new Rx number when refilled electronically. Your pharmacy may indicate that no refills were authorized even though a new prescription for the same medication is available at the pharmacy. Please request the medicine by name with the pharmacy before contacting your provider for a refill.           MyChart Status: Patient Declined

## 2017-06-27 NOTE — ASSESSMENT & PLAN NOTE
Followed by Dr. Mae, avoids NSAIDs. Drinks water and Gatorade as advised.  Last appointment June 2017.

## 2017-07-17 ENCOUNTER — HOSPITAL ENCOUNTER (OUTPATIENT)
Dept: RADIOLOGY | Facility: MEDICAL CENTER | Age: 77
End: 2017-07-17
Attending: NURSE PRACTITIONER
Payer: MEDICARE

## 2017-07-17 DIAGNOSIS — Z12.31 VISIT FOR SCREENING MAMMOGRAM: ICD-10-CM

## 2017-07-17 PROCEDURE — 77063 BREAST TOMOSYNTHESIS BI: CPT

## 2017-09-26 RX ORDER — LEVOTHYROXINE SODIUM 0.05 MG/1
50 TABLET ORAL
Qty: 90 TAB | Refills: 0 | Status: SHIPPED | OUTPATIENT
Start: 2017-09-26 | End: 2017-12-23 | Stop reason: SDUPTHER

## 2017-09-26 NOTE — TELEPHONE ENCOUNTER
Was the patient seen in the last year in this department? Yes     Does patient have an active prescription for medications requested? No     Received Request Via: Patient     *Patient had an appointment scheduled on 10/16/17, patient rescheduled for 10/30/17.

## 2017-09-26 NOTE — TELEPHONE ENCOUNTER
Requested Prescriptions     Pending Prescriptions Disp Refills   • levothyroxine (SYNTHROID) 50 MCG Tab 90 Tab 1     Sig: Take 1 Tab by mouth every day.     RX sent to pharmacy.  LANE Hayden.

## 2017-09-28 ENCOUNTER — PATIENT OUTREACH (OUTPATIENT)
Dept: HEALTH INFORMATION MANAGEMENT | Facility: OTHER | Age: 77
End: 2017-09-28

## 2017-09-28 RX ORDER — LEVOTHYROXINE SODIUM 0.05 MG/1
TABLET ORAL
Refills: 1 | OUTPATIENT
Start: 2017-09-28

## 2017-09-28 NOTE — PROGRESS NOTES
Attempt #:1    WebIZ Checked & Epic Updated: Yes  · WebIZ Recommendations: FLU  · Is patient due for Tdap? NO  · Is patient due for Shingles? NO  [facility] Verified: yes  Verify PCP: yes    Communication Preference Obtained: yes     Review Care Team: yes    Annual Wellness Visit Scheduling  1. Scheduling Status:Scheduled        Care Gap Scheduling (Attempt to Schedule EACH Overdue Care Gap!)     Health Maintenance Due   Topic Date Due   • IMM INFLUENZA (1) 09/01/2017        Scheduled patient for Annual Wellness Visit and Immunizations: FLU       MyChart Activation: DECLINED  MyChart Rasheeda: no  Virtual Visits: no  Opt In to Text Messages: no

## 2017-11-30 DIAGNOSIS — I10 ESSENTIAL HYPERTENSION: ICD-10-CM

## 2017-12-01 ENCOUNTER — TELEPHONE (OUTPATIENT)
Dept: MEDICAL GROUP | Facility: PHYSICIAN GROUP | Age: 77
End: 2017-12-01

## 2017-12-01 RX ORDER — LOSARTAN POTASSIUM 100 MG/1
100 TABLET ORAL DAILY
Qty: 90 TAB | Refills: 1 | Status: SHIPPED | OUTPATIENT
Start: 2017-12-01 | End: 2018-05-24 | Stop reason: SDUPTHER

## 2017-12-01 NOTE — TELEPHONE ENCOUNTER
Was the patient seen in the last year in this department? Yes     Does patient have an active prescription for medications requested? No     Received Request Via: Pharmacy      Pt met protocol?: Yes pt last ov 6/17   BP Readings from Last 1 Encounters:   06/26/17 122/68

## 2017-12-01 NOTE — TELEPHONE ENCOUNTER
Future Appointments       Provider Department Center    12/6/2017 10:00 AM NORA Hayden; [facility] [place]    12/14/2017 9:30 AM She Mae M.D. [facility] [address] [place] [address]          ANNUAL WELLNESS VISIT PRE-VISIT PLANNING     1.  Reviewed note from last office visit with PCP: YES Visit date: 06/26/17    2.  If any orders were placed at last visit, do we have Results/Consult Notes?        •  Labs - Labs were not ordered at last office visit.       •  Imaging - Imaging ordered, completed and results are in chart.        •  Referrals - No referrals were ordered at last office visit.     3.  Immunizations were updated in [employer] using WebIZ?: Yes       •  WebIZ Recommendations: FLU       •  Is patient due for Tdap? NO       •  Is patient due for Shingles? NO     4.  Patient is due for the following Health Maintenance Topics:   Health Maintenance Due   Topic Date Due   • IMM INFLUENZA (1) DUE DURING VISIT   • Annual Wellness Visit  SCHEDULED FOR 12/06/17       - Patient plans to schedule appointment for Immunizations: FLU.      5.  Reviewed/Updated the following with patient:       •   Preferred Pharmacy? YES       •   Preferred Lab? YES       •   Preferred Communication? YES       •   Allergies? YES       •   Medications? YES. Was Abstract Encounter opened and chart updated? YES       •   Social History? YES. Was Abstract Encounter opened and chart updated? YES       •   Family History (document living status of immediate family members and if + hx of cancer, diabetes, hypertension, hyperlipidemia, heart attack, stroke) YES. Was Abstract Encounter opened and chart updated? YES    6.  Care Team Updated:       •   DME Company (gait device, O2, CPAP, etc.): YES       •   Other Specialists (eye doctor, derm, GYN, cardiology, endo, etc): YES       •   Last eye exam: 36 YEARS AGO    7. Patient has the following Care Path diagnoses on Problem List:  NONE    8.   Is patient in need of any refills prior to office visit? No    9. DPA/Advanced Directive:  Patient does not have an Advanced Directive.  A packet and workshop information was given on Advanced Directives.     10.  Specialty Comments was updated with diagnosis information provided by [facility]: YES NO NOTE    11.  Patient was advised: “This is a free wellness visit. The provider will screen for medical conditions to help you stay healthy. If you have other concerns to address you may be asked to discuss these at a separate visit or there may be an additional fee.”     12.  Patient was informed to arrive 15 min prior to their scheduled appointment and bring in their medication bottles?  YES

## 2017-12-02 NOTE — TELEPHONE ENCOUNTER
Refill X 6 months, sent to pharmacy.Pt. Seen in the last 6 months per protocol.   Lab Results   Component Value Date/Time    SODIUM 136 06/12/2017 06:28 AM    POTASSIUM 4.4 06/12/2017 06:28 AM    CHLORIDE 105 06/12/2017 06:28 AM    CO2 24 06/12/2017 06:28 AM    GLUCOSE 97 06/12/2017 06:28 AM    BUN 18 06/12/2017 06:28 AM    CREATININE 0.99 06/12/2017 06:28 AM

## 2017-12-06 ENCOUNTER — OFFICE VISIT (OUTPATIENT)
Dept: MEDICAL GROUP | Facility: PHYSICIAN GROUP | Age: 77
End: 2017-12-06
Payer: MEDICARE

## 2017-12-06 ENCOUNTER — HOSPITAL ENCOUNTER (OUTPATIENT)
Dept: LAB | Facility: MEDICAL CENTER | Age: 77
End: 2017-12-06
Attending: NURSE PRACTITIONER
Payer: MEDICARE

## 2017-12-06 ENCOUNTER — HOSPITAL ENCOUNTER (OUTPATIENT)
Dept: LAB | Facility: MEDICAL CENTER | Age: 77
End: 2017-12-06
Attending: INTERNAL MEDICINE
Payer: MEDICARE

## 2017-12-06 VITALS
HEIGHT: 60 IN | RESPIRATION RATE: 16 BRPM | SYSTOLIC BLOOD PRESSURE: 122 MMHG | DIASTOLIC BLOOD PRESSURE: 86 MMHG | TEMPERATURE: 98.5 F | OXYGEN SATURATION: 96 % | WEIGHT: 214 LBS | BODY MASS INDEX: 42.01 KG/M2 | HEART RATE: 89 BPM

## 2017-12-06 DIAGNOSIS — E66.01 OBESITY, MORBID, BMI 40.0-49.9 (HCC): ICD-10-CM

## 2017-12-06 DIAGNOSIS — N18.30 CKD (CHRONIC KIDNEY DISEASE), STAGE III (HCC): ICD-10-CM

## 2017-12-06 DIAGNOSIS — Z00.00 MEDICARE ANNUAL WELLNESS VISIT, SUBSEQUENT: Primary | ICD-10-CM

## 2017-12-06 DIAGNOSIS — E55.9 VITAMIN D DEFICIENCY DISEASE: ICD-10-CM

## 2017-12-06 DIAGNOSIS — I10 ESSENTIAL HYPERTENSION: ICD-10-CM

## 2017-12-06 DIAGNOSIS — E03.9 ACQUIRED HYPOTHYROIDISM: ICD-10-CM

## 2017-12-06 DIAGNOSIS — Z23 NEED FOR INFLUENZA VACCINATION: ICD-10-CM

## 2017-12-06 DIAGNOSIS — E87.1 HYPONATREMIA: ICD-10-CM

## 2017-12-06 LAB
ANION GAP SERPL CALC-SCNC: 8 MMOL/L (ref 0–11.9)
BUN SERPL-MCNC: 16 MG/DL (ref 8–22)
CALCIUM SERPL-MCNC: 9.8 MG/DL (ref 8.5–10.5)
CHLORIDE SERPL-SCNC: 104 MMOL/L (ref 96–112)
CO2 SERPL-SCNC: 24 MMOL/L (ref 20–33)
CREAT SERPL-MCNC: 0.94 MG/DL (ref 0.5–1.4)
GFR SERPL CREATININE-BSD FRML MDRD: 58 ML/MIN/1.73 M 2
GLUCOSE SERPL-MCNC: 77 MG/DL (ref 65–99)
POTASSIUM SERPL-SCNC: 4.3 MMOL/L (ref 3.6–5.5)
SODIUM SERPL-SCNC: 136 MMOL/L (ref 135–145)
TSH SERPL DL<=0.005 MIU/L-ACNC: 2.89 UIU/ML (ref 0.3–3.7)

## 2017-12-06 PROCEDURE — G0439 PPPS, SUBSEQ VISIT: HCPCS | Mod: 25 | Performed by: NURSE PRACTITIONER

## 2017-12-06 PROCEDURE — 90662 IIV NO PRSV INCREASED AG IM: CPT | Performed by: NURSE PRACTITIONER

## 2017-12-06 PROCEDURE — 84443 ASSAY THYROID STIM HORMONE: CPT

## 2017-12-06 PROCEDURE — 36415 COLL VENOUS BLD VENIPUNCTURE: CPT

## 2017-12-06 PROCEDURE — 80048 BASIC METABOLIC PNL TOTAL CA: CPT

## 2017-12-06 PROCEDURE — G0008 ADMIN INFLUENZA VIRUS VAC: HCPCS | Performed by: NURSE PRACTITIONER

## 2017-12-06 ASSESSMENT — PATIENT HEALTH QUESTIONNAIRE - PHQ9: CLINICAL INTERPRETATION OF PHQ2 SCORE: 0

## 2017-12-06 NOTE — PROGRESS NOTES
Chief Complaint   Patient presents with   • Annual Wellness Visit         HPI:  Jammie is a 77 y.o. here for Medicare Annual Wellness Visit    Taking all medications as directed, no concerns.     Patient Active Problem List    Diagnosis Date Noted   • Obesity, morbid, BMI 40.0-49.9 (CMS-[facility]) 04/17/2015     Priority: High   • Vitamin D deficiency disease 02/11/2016   • CKD (chronic kidney disease), stage III 09/04/2015   • Essential hypertension 09/04/2015   • Hyponatremia 09/04/2015   • Acquired hypothyroidism 09/03/2015   • Abnormal Pap smear of cervix 07/02/2014       Current Outpatient Prescriptions   Medication Sig Dispense Refill   • losartan (COZAAR) 100 MG Tab TAKE 1 TAB BY MOUTH EVERY DAY. 90 Tab 1   • levothyroxine (SYNTHROID) 50 MCG Tab Take 1 Tab by mouth every day. 90 Tab 0   • cyanocobalamin (VITAMIN B-12) 500 MCG Tab Take 500 mcg by mouth every day.     • Misc Natural Products (OSTEO BI-FLEX ADV DOUBLE ST PO) Take  by mouth.     • Probiotic Product (PROBIOTIC PO) Take  by mouth.     • Cholecalciferol (VITAMIN D-3 PO) Take 1,000 Units by mouth every day.     • therapeutic multivitamin-minerals (THERAGRAN-M) TABS Take 1 Tab by mouth every day.       No current facility-administered medications for this visit.         Patient is taking medications as noted in medication list.  Current supplements as per medication list.     Allergies: Patient has no known allergies.    Current social contact/activities: visit with family and friend, monthly dinners, go to [employer]/ stationary bike 7 days a week twice a day 35 mins in morning 20 mins in afternoon     Is patient current with immunizations? No, due for FLU. Patient is interested in receiving FLU today.    She  reports that she quit smoking about 40 years ago. Her smoking use included Cigarettes. She has a 3.00 pack-year smoking history. She has never used smokeless tobacco. She reports that she does not drink alcohol or use drugs.  Counseling given:  Yes        DPA/Advanced directive: Patient does not have an Advanced Directive.  A packet and workshop information was given on Advanced Directives.    ROS:    Gait: Uses no assistive device   Ostomy: no   Other tubes: no   Amputations: no   Chronic oxygen use no   Last eye exam 12/20/2017   Wears hearing aids: no   : Denies any urinary leakage during the last 6 months          Depression Screening    Little interest or pleasure in doing things?  0 - not at all  Feeling down, depressed, or hopeless? 0 - not at all  Patient Health Questionnaire Score: 0    If depressive symptoms identified deferred to follow up visit unless specifically addressed in assessment and plan.    Interpretation of PHQ-9 Total Score   Score Severity   1-4 No Depression   5-9 Mild Depression   10-14 Moderate Depression   15-19 Moderately Severe Depression   20-27 Severe Depression    Screening for Cognitive Impairment    Three Minute Recall (apple, watch, caty)  3/3    Draw clock face with all 12 numbers set to the hand to show 10 minutes past 11 o'clock  1 4/5  If cognitive concerns identified, deferred for follow up unless specifically addressed in assessment and plan.    Fall Risk Assessment    Has the patient had two or more falls in the last year or any fall with injury in the last year?  No  If fall risk identified, deferred for follow up unless specifically addressed in assessment and plan.    Safety Assessment    Throw rugs on floor.  Yes  Handrails on all stairs.  Yes  Good lighting in all hallways.  Yes  Difficulty hearing.  No  Patient counseled about all safety risks that were identified.    Functional Assessment ADLs    Are there any barriers preventing you from cooking for yourself or meeting nutritional needs?  No.    Are there any barriers preventing you from driving safely or obtaining transportation?  No.    Are there any barriers preventing you from using a telephone or calling for help?  No.    Are there any barriers  preventing you from shopping?  No.    Are there any barriers preventing you from taking care of your own finances?  No.    Are there any barriers preventing you from managing your medications?  No.    Are you currently engaging any exercise or physical activity?  Yes.       Health Maintenance Summary                IMM INFLUENZA        Done today Imm Admin: Influenza Vaccine Adult HD     Patient has more history with this topic...    Annual Wellness Visit        Done 10/13/2016 Visit Dx: Medicare annual wellness visit, subsequent     Patient has more history with this topic...    MAMMOGRAM Next Due 7/17/2018      Done 7/17/2017 MA-MAMMO SCREENING BILAT W/TOMOSYNTHESIS W/CAD     Patient has more history with this topic...    BONE DENSITY Next Due 7/11/2019      Done 7/11/2014 DS-BONE DENSITY STUDY (DEXA)    COLONOSCOPY Next Due 10/12/2025      Done 10/12/2015 REFERRAL TO GI FOR COLONOSCOPY    IMM DTaP/Tdap/Td Vaccine Next Due 8/29/2026      Done 8/29/2016 Imm Admin: Tdap Vaccine          Patient Care Team:  NORA Hayden as PCP - General (Family Medicine)  She Mae M.D. as Consulting Physician (Nephrology)  Nitin Akhtar O.D. as Consulting Physician (Optometry)    Social History   Substance Use Topics   • Smoking status: Former Smoker     Packs/day: 0.25     Years: 12.00     Types: Cigarettes     Quit date: 1/1/1977   • Smokeless tobacco: Never Used      Comment: Started smoking at age 25 (social smoker)   • Alcohol use No     Family History   Problem Relation Age of Onset   • Heart Attack Mother    • Alcohol abuse Mother      etoh   • Lung Disease Father      smoker   • Alcohol abuse Father      etoh   • Heart Attack Father    • Hypertension Sister    • Hyperlipidemia Sister    • Lung Disease Sister      smoker   • Colon Cancer Sister    • Lung Disease Maternal Aunt    • Heart Disease Maternal Aunt    • Hypertension Maternal Aunt    • Hyperlipidemia Maternal Aunt    • Stroke Maternal Grandmother     • Hypertension Maternal Grandmother    • Other Maternal Grandmother      TB of the brain   • Stroke Maternal Grandfather    • Hypertension Maternal Grandfather    • Colon Cancer Paternal Grandmother    • No Known Problems Paternal Grandfather      She  has a past medical history of Abnormal Pap smear of cervix; CKD (chronic kidney disease), stage III (9/4/2015); Essential hypertension (9/4/2015); Other specified hypothyroidism (9/3/2015); and Tuberculosis. She also has no past medical history of Headache(784.0); Ulcer (CMS-HCC); or Urinary tract infection, site not specified.   Past Surgical History:   Procedure Laterality Date   • TONSILLECTOMY             Exam:     Blood pressure 122/86, pulse 89, temperature 36.9 °C (98.5 °F), resp. rate 16, height 1.524 m (5'), weight 97.1 kg (214 lb), SpO2 96 %. Body mass index is 41.79 kg/m².    Hearing good.    Alert, oriented in no acute distress.  Eye contact is good, speech goal directed, affect calm      Assessment and Plan. The following treatment and monitoring plan is recommended:    1. Medicare annual wellness visit, subsequent  Annual Wellness Visit - Includes PPPS Subsequent ()   2. Obesity, morbid, BMI 40.0-49.9 (CMS-HCC)  Patient identified as having weight management issue.  Appropriate orders and counseling given.    Annual Wellness Visit - Includes PPPS Subsequent ()   3. Essential hypertension  Annual Wellness Visit - Includes PPPS Subsequent ()    Stable. Continue current medications and regular monitoring. We'll continue to monitor.   4. Acquired hypothyroidism  Annual Wellness Visit - Includes PPPS Subsequent ()    TSH    Stable. She was at the lab today and continue current medications unless notified otherwise.   5. CKD (chronic kidney disease), stage III  Annual Wellness Visit - Includes PPPS Subsequent ()    Stable and improving. Continue follow-up with nephrology. We'll continue to monitor.   6. Vitamin D deficiency disease   Annual Wellness Visit - Includes PPPS Subsequent ()    Stable. Continue supplements, monitoring, and follow-up with nephrology. We'll continue to monitor.   7. Need for influenza vaccination  INFLUENZA VACCINE, HIGH DOSE (65+ ONLY)    Annual Wellness Visit - Includes PPPS Subsequent ()     The patient was counseled on the requirements, possible side effects, and future requirements of immunizations for today.  Immunizations given by MA after completion of vaccine screening checklist, under the direction of Dr. Zaira Yates.      Services suggested: No services needed at this time  Health Care Screening recommendations as per orders if indicated.  Referrals offered: PT/OT/Nutrition counseling/Behavioral Health/Smoking cessation as per orders if indicated.    Discussion today about general wellness and lifestyle habits:    · Prevent falls and reduce trip hazards; Cautioned about securing or removing rugs.  · Have a working fire alarm and carbon monoxide detector;   · Engage in regular physical activity and social activities       Follow-up: Return in about 6 months (around 6/6/2018) for HTN, multiple chronic problems.

## 2017-12-14 ENCOUNTER — OFFICE VISIT (OUTPATIENT)
Dept: NEPHROLOGY | Facility: MEDICAL CENTER | Age: 77
End: 2017-12-14
Payer: MEDICARE

## 2017-12-14 VITALS
WEIGHT: 216 LBS | HEART RATE: 84 BPM | OXYGEN SATURATION: 96 % | SYSTOLIC BLOOD PRESSURE: 124 MMHG | BODY MASS INDEX: 42.41 KG/M2 | HEIGHT: 60 IN | DIASTOLIC BLOOD PRESSURE: 84 MMHG | RESPIRATION RATE: 16 BRPM | TEMPERATURE: 97.7 F

## 2017-12-14 DIAGNOSIS — E55.9 VITAMIN D DEFICIENCY DISEASE: ICD-10-CM

## 2017-12-14 DIAGNOSIS — N18.30 CKD (CHRONIC KIDNEY DISEASE), STAGE III (HCC): ICD-10-CM

## 2017-12-14 DIAGNOSIS — I10 ESSENTIAL HYPERTENSION: ICD-10-CM

## 2017-12-14 DIAGNOSIS — E87.1 HYPONATREMIA: ICD-10-CM

## 2017-12-14 PROCEDURE — 99214 OFFICE O/P EST MOD 30 MIN: CPT | Performed by: INTERNAL MEDICINE

## 2017-12-14 NOTE — PROGRESS NOTES
Subjective:      Jammie Berry is a 77 y.o. female who presents for f/u CKD III, hyponatremia          HPI  Jammie is coming today for f/u of CKD III, HTN, hyponatremia  Doing well, no complaints except chronic back pain  -off NSAID's  No difficulties to urinate, no dysuria, no hematuria.  HTN: compliant to medications -BP well controlled  CKD III:Serum creatinine level stable - at 0.9's  Hyponatremia - well controlled    Review of Systems   Constitutional:Negative for fever, chills and malaise/fatigue.   Respiratory: Negative for cough, hemoptysis, shortness of breath and wheezing.    Cardiovascular: Negative for chest pain, palpitations, orthopnea and leg swelling.   Gastrointestinal: Negative for nausea, vomiting and abdominal pain.   Genitourinary: Negative for dysuria, urgency, frequency, hematuria and flank pain.   Musculoskeletal: Positive for back pain. Negative for myalgias and neck pain.   Neurological: Negative for dizziness, sensory change, focal weakness and headaches.     PMH/FH/SH/allergies and medications reviewed     Objective:     /84   Pulse 84   Temp 36.5 °C (97.7 °F)   Resp 16   Ht 1.524 m (5')   Wt 98 kg (216 lb)   SpO2 96%   BMI 42.18 kg/m²      Physical Exam   Constitutional: She is oriented to person, place, and time. She appears well-developed and well-nourished. No distress.   HENT:   Head: Normocephalic and atraumatic.   Nose: Nose normal.   Mouth/Throat: Oropharynx is clear and moist.   Eyes: Conjunctivae and EOM are normal. Pupils are equal, round, and reactive to light.   Neck: Normal range of motion. Neck supple.   Cardiovascular: Normal rate and regular rhythm.  Exam reveals no gallop and no friction rub.    Pulmonary/Chest: Effort normal and breath sounds normal. She has no wheezes. She has no rales.   Abdominal: Soft. Bowel sounds are normal. She exhibits no distension. There is no tenderness.   Musculoskeletal: She exhibits no edema.   Neurological: She is alert  and oriented to person, place, and time. No cranial nerve deficit. Coordination normal.   Skin: Skin is warm. No rash noted. No erythema.             Laboratory results reviewed: d/w pt    Lab Results   Component Value Date/Time    CREATININE 0.94 12/06/2017 10:54 AM    POTASSIUM 4.3 12/06/2017 10:54 AM         Assessment/Plan:     1. CKD (chronic kidney disease), stage III      Creatinine level stable -to monitor       2. Essential hypertension      BP well controlled    3. Hyponatremia      Well controlled    4. Vit D def; well controlled  - WNL    Recs: continue current treatment             Avoid NSAID's             F/u in 6 months with BMP

## 2017-12-26 RX ORDER — LEVOTHYROXINE SODIUM 0.05 MG/1
50 TABLET ORAL
Qty: 90 TAB | Refills: 1 | Status: SHIPPED | OUTPATIENT
Start: 2017-12-26 | End: 2018-05-24 | Stop reason: SDUPTHER

## 2017-12-26 NOTE — TELEPHONE ENCOUNTER
Was the patient seen in the last year in this department? Yes     Does patient have an active prescription for medications requested? No     Received Request Via: Pharmacy      Pt met protocol?: Yes pt last ov 12/2017   TSH   Date Value Ref Range Status   12/06/2017 2.890 0.300 - 3.700 uIU/mL Final

## 2018-03-19 ENCOUNTER — PATIENT OUTREACH (OUTPATIENT)
Dept: HEALTH INFORMATION MANAGEMENT | Facility: OTHER | Age: 78
End: 2018-03-19

## 2018-03-19 NOTE — PROGRESS NOTES
1. Attempt #: 1    2. HealthConnect Verified: yes    3. Verify PCP: yes    4. Care Team Updated:       •   DME Company (gait device, O2, CPAP, etc.): YES       •   Other Specialists (eye doctor, derm, GYN, cardiology, endo, etc): YES    5.  Reviewed/Updated the following with patient:       •   Communication Preference Obtained? YES       •   Preferred Pharmacy? YES       •   Preferred Lab? YES       •   Family History (document living status of immediate family members and if + hx of cancer, diabetes, hypertension, hyperlipidemia, heart attack, stroke) YES. Was Abstract Encounter opened and chart updated? YES    6. [employer] Activation:declined     7. [employer] Rasheeda: no    8. Annual Wellness Visit Scheduling  Scheduling Status:Scheduled      9. Care Gap Scheduling (Attempt to Schedule EACH Overdue Care Gap!)     There are no preventive care reminders to display for this patient.     Scheduled patient for Annual Wellness Visit    10. Patient was advised: “This is a free wellness visit. The provider will screen for medical conditions to help you stay healthy. If you have other concerns to address you may be asked to discuss these at a separate visit or there may be an additional fee.”     11. Patient was informed to arrive 15 min prior to their scheduled appointment and bring in their medication bottles.

## 2018-05-11 ENCOUNTER — APPOINTMENT (OUTPATIENT)
Dept: OTHER | Facility: IMAGING CENTER | Age: 78
End: 2018-05-11

## 2018-05-21 ENCOUNTER — TELEPHONE (OUTPATIENT)
Dept: MEDICAL GROUP | Facility: PHYSICIAN GROUP | Age: 78
End: 2018-05-21

## 2018-05-21 NOTE — TELEPHONE ENCOUNTER
Future Appointments       Provider Department Center    5/24/2018 11:00 AM [facility].D.; [facility]    6/6/2018 9:55 AM NORA Hayden [facility]    6/14/2018 10:00 AM She Mae M.D. [facility] [address]        ANNUAL WELLNESS VISIT PRE-VISIT PLANNING WITH OUTREACH    1.  Immunizations were updated in Epic using WebIZ?:Yes       •  WebIZ Recommendations: TD and Shingrix       •  Is patient due for Tdap? NO       •  Is patient due for Shingles?YES. Patient was notified of copay/out of pocket cost.    2.  MDX printed for Provider? YES        3.  Reviewed note from last office visit with PCP: YES    4.  If any orders were placed at last visit, do we have Results/Consult Notes?        •  Labs - Labs ordered, NOT completed. Patient advised to complete prior to next appointment.       •  Imaging - Imaging was not ordered at last office visit.       •  Referrals - No referrals were ordered at last office visit.    5.  Patient is due for the following Health Maintenance Topics:   There are no preventive care reminders to display for this patient.        6.  Patient has the following Care Path diagnoses on Problem List:  NONE

## 2018-05-24 ENCOUNTER — OFFICE VISIT (OUTPATIENT)
Dept: MEDICAL GROUP | Facility: PHYSICIAN GROUP | Age: 78
End: 2018-05-24
Payer: MEDICARE

## 2018-05-24 VITALS
BODY MASS INDEX: 42.41 KG/M2 | WEIGHT: 216 LBS | HEIGHT: 60 IN | DIASTOLIC BLOOD PRESSURE: 82 MMHG | OXYGEN SATURATION: 96 % | HEART RATE: 75 BPM | TEMPERATURE: 98.3 F | SYSTOLIC BLOOD PRESSURE: 126 MMHG

## 2018-05-24 DIAGNOSIS — H40.53X0 GLAUCOMA OF BOTH EYES SECONDARY TO OTHER EYE DISORDER, UNSPECIFIED GLAUCOMA STAGE: ICD-10-CM

## 2018-05-24 DIAGNOSIS — E55.9 VITAMIN D DEFICIENCY DISEASE: ICD-10-CM

## 2018-05-24 DIAGNOSIS — E66.01 MORBID OBESITY WITH BMI OF 40.0-44.9, ADULT (HCC): ICD-10-CM

## 2018-05-24 DIAGNOSIS — E66.01 OBESITY, MORBID, BMI 40.0-49.9 (HCC): ICD-10-CM

## 2018-05-24 DIAGNOSIS — I10 ESSENTIAL HYPERTENSION: ICD-10-CM

## 2018-05-24 DIAGNOSIS — E03.9 ACQUIRED HYPOTHYROIDISM: ICD-10-CM

## 2018-05-24 DIAGNOSIS — N18.30 CKD (CHRONIC KIDNEY DISEASE), STAGE III (HCC): ICD-10-CM

## 2018-05-24 DIAGNOSIS — Z00.00 HEALTHCARE MAINTENANCE: ICD-10-CM

## 2018-05-24 DIAGNOSIS — E87.1 HYPONATREMIA: ICD-10-CM

## 2018-05-24 PROBLEM — H40.9 GLAUCOMA OF BOTH EYES: Status: ACTIVE | Noted: 2018-05-24

## 2018-05-24 PROCEDURE — G0439 PPPS, SUBSEQ VISIT: HCPCS | Performed by: INTERNAL MEDICINE

## 2018-05-24 RX ORDER — BRIMONIDINE TARTRATE AND TIMOLOL MALEATE 2; 5 MG/ML; MG/ML
SOLUTION OPHTHALMIC
COMMUNITY
End: 2019-06-04

## 2018-05-24 RX ORDER — LOSARTAN POTASSIUM 100 MG/1
100 TABLET ORAL DAILY
Qty: 90 TAB | Refills: 1 | Status: SHIPPED | OUTPATIENT
Start: 2018-05-24 | End: 2018-11-25 | Stop reason: SDUPTHER

## 2018-05-24 RX ORDER — LEVOTHYROXINE SODIUM 0.05 MG/1
50 TABLET ORAL
Qty: 90 TAB | Refills: 1 | Status: SHIPPED | OUTPATIENT
Start: 2018-05-24 | End: 2018-12-14 | Stop reason: SDUPTHER

## 2018-05-24 ASSESSMENT — ACTIVITIES OF DAILY LIVING (ADL)
BATHING_REQUIRES_ASSISTANCE: 0
TRANSPORTATION COMMENTS: HUSBAND DRIVES

## 2018-05-24 ASSESSMENT — PATIENT HEALTH QUESTIONNAIRE - PHQ9: CLINICAL INTERPRETATION OF PHQ2 SCORE: 0

## 2018-05-24 ASSESSMENT — ENCOUNTER SYMPTOMS: GENERAL WELL-BEING: GOOD

## 2018-05-24 NOTE — LETTER
[employer]  NORA Hayden  [address]  [facility] [place] [address]  Fax: [phone]   Authorization for Release/Disclosure of   Protected Health Information   Name: JAMMIE OSMAN : 1940 SSN: [id number]   Address: [address] [place] [address] Phone:    [phone] (home)    I authorize the entity listed below to release/disclose the PHI below to:   [facility]/NORA Hayden and Ina Linares M.D.   Provider or Entity Name:Nitin Akhtar O.D.     Address   City, [facility]   Phone:      Fax:[phone]     Reason for request: continuity of care   Information to be released:    [  ] LAST COLONOSCOPY,  including any PATH REPORT and follow-up  [  ] LAST FIT/COLOGUARD RESULT [  ] LAST DEXA  [  ] LAST MAMMOGRAM  [  ] LAST PAP  [  ] LAST LABS [ X ] RETINA EXAM REPORT  [  ] IMMUNIZATION RECORDS  [  ] Release all info      [  ] Check here and initial the line next to each item to release ALL health information INCLUDING  _____ Care and treatment for drug and / or alcohol abuse  _____ HIV testing, infection status, or AIDS  _____ Genetic Testing    DATES OF SERVICE OR TIME PERIOD TO BE DISCLOSED: _____________  I understand and acknowledge that:  * This Authorization may be revoked at any time by you in writing, except if your health information has already been used or disclosed.  * Your health information that will be used or disclosed as a result of you signing this authorization could be re-disclosed by the recipient. If this occurs, your re-disclosed health information may no longer be protected by State or Federal laws.  * You may refuse to sign this Authorization. Your refusal will not affect your ability to obtain treatment.  * This Authorization becomes effective upon signing and will  on (date) __________.      If no date is indicated, this Authorization will  one (1) year from the signature date.    Name: Jammie Sousa  Jamal    Signature:   Date:     5/24/2018       PLEASE FAX REQUESTED RECORDS BACK TO: [phone]

## 2018-05-24 NOTE — ASSESSMENT & PLAN NOTE
This is a chronic health problem that is well controlled with current medications and lifestyle measures. BP in office today from 126/82, on losartan 100 mg daily.

## 2018-05-24 NOTE — LETTER
Request for Medical Records    Patient Name: Jammie Berry    : 1940      Dear Doctor: Nitin Akhtar O.D.    The above named patient receives primary care at the [facility] by NORA Hayden.  The patient informs us that you are her eye care Provider.    Please fax a copy of the most recent eye exam to [phone] or answer the  questions below and fax this sheet back to us at the above number.  Attached is a signed Release of Information.      Date of last eye exam: _____________    Retinal eye exam summary:        Please select the choice(s) that apply.    ____ No diabetic retinopathy    ____    Diabetic retinopathy present      Printed Name and Credentials: __________________________________    Signature of Eye Care Provider: _________________________________    We appreciate your assistance and collaboration in providing efficient patient care!    Kindest Regards,    [facility]  [address] [place] [address] [address]  [facility] [address] [phone]

## 2018-05-24 NOTE — ASSESSMENT & PLAN NOTE
Recently diagnosed for glaucoma and currently on eyedrops Lumigan and Combigan. Follows ophthalmology , will get the records. To get scheduled a cataract surgery late this year.  Denies any symptoms of visual disturbances/pain.

## 2018-05-24 NOTE — ASSESSMENT & PLAN NOTE
This is a chronic health problem that is well controlled with current medications and lifestyle measures. Last check in December 2017, currently on levothyroxine 50 µg daily.

## 2018-05-24 NOTE — ASSESSMENT & PLAN NOTE
Mammogram due till July 2018, last colonoscopy in 2015 which is good for 10 years and DEXA scan due in 2019.

## 2018-05-24 NOTE — PROGRESS NOTES
Chief Complaint   Patient presents with   • Annual Wellness Visit         HPI:  Jammie is a 77 y.o. here for Medicare Annual Wellness Visit    Glaucoma of both eyes  Recently diagnosed for glaucoma and currently on eyedrops Lumigan and Combigan. Follows ophthalmology , will get the records. To get scheduled a cataract surgery late this year.  Denies any symptoms of visual disturbances/pain.    Essential hypertension  This is a chronic health problem that is well controlled with current medications and lifestyle measures. BP in office today from 126/82, on losartan 100 mg daily.    Acquired hypothyroidism  This is a chronic health problem that is well controlled with current medications and lifestyle measures. Last check in December 2017, currently on levothyroxine 50 µg daily.    Healthcare maintenance  Mammogram due till July 2018, last colonoscopy in 2015 which is good for 10 years and DEXA scan due in 2019.    PHQ score 0, BMI > 41 refusing health improvement program, former tobacco, no fall injuries    Patient Active Problem List    Diagnosis Date Noted   • Obesity, morbid, BMI 40.0-49.9 (CMS-[facility]) 04/17/2015     Priority: High   • Morbid obesity with BMI of 40.0-44.9, adult ([facility]) 05/24/2018   • Glaucoma of both eyes 05/24/2018   • Healthcare maintenance 05/24/2018   • Vitamin D deficiency disease 02/11/2016   • CKD (chronic kidney disease), stage III 09/04/2015   • Essential hypertension 09/04/2015   • Hyponatremia 09/04/2015   • Acquired hypothyroidism 09/03/2015   • Abnormal Pap smear of cervix 07/02/2014       Current Outpatient Prescriptions   Medication Sig Dispense Refill   • Brimonidine Tartrate-Timolol (COMBIGAN) 0.2-0.5 % Solution by Ophthalmic route.     • bimatoprost (LUMIGAN) 0.01 % Solution Place 1 Drop in both eyes every bedtime.     • losartan (COZAAR) 100 MG Tab Take 1 Tab by mouth every day. 90 Tab 1   • levothyroxine (SYNTHROID) 50 MCG Tab Take 1 Tab by mouth every day. 90 Tab 1   •  cyanocobalamin (VITAMIN B-12) 500 MCG Tab Take 500 mcg by mouth every day.     • Misc Natural Products (OSTEO BI-FLEX ADV DOUBLE ST PO) Take  by mouth.     • Probiotic Product (PROBIOTIC PO) Take  by mouth.     • Cholecalciferol (VITAMIN D-3 PO) Take 1,000 Units by mouth every day.     • therapeutic multivitamin-minerals (THERAGRAN-M) TABS Take 1 Tab by mouth every day.       No current facility-administered medications for this visit.         Patient is taking medications as noted in medication list.  Current supplements as per medication list.     Allergies: Patient has no known allergies.    Current social contact/activities: Visit family and friends      Is patient current with immunizations? No, due for ZOSTAVAX (Shingles). Patient is interested in receiving NONE today.    She  reports that she quit smoking about 41 years ago. Her smoking use included Cigarettes. She has a 3.00 pack-year smoking history. She has never used smokeless tobacco. She reports that she does not drink alcohol or use drugs.  Counseling given: Not Answered        DPA/Advanced directive: Patient has Advanced Directive on file.     ROS:    Gait: Uses no assistive device   Ostomy: no   Other tubes: no   Amputations: no   Chronic oxygen use no   Last eye exam 12/2018   Wears hearing aids: no   : Denies any urinary leakage during the last 6 months          Depression Screening    Little interest or pleasure in doing things?  0 - not at all  Feeling down, depressed, or hopeless? 0 - not at all  Patient Health Questionnaire Score: 0    If depressive symptoms identified deferred to follow up visit unless specifically addressed in assessment and plan.    Interpretation of PHQ-9 Total Score   Score Severity   1-4 No Depression   5-9 Mild Depression   10-14 Moderate Depression   15-19 Moderately Severe Depression   20-27 Severe Depression    Screening for Cognitive Impairment    Three Minute Recall (leader, season, table)  3/3 Leader season  table  Bryan clock face with all 12 numbers and set the hands to show 10 past 11.  Yes 11:10 5/5  If cognitive concerns identified, deferred for follow up unless specifically addressed in assessment and plan.    Fall Risk Assessment    Has the patient had two or more falls in the last year or any fall with injury in the last year?  No  If fall risk identified, deferred for follow up unless specifically addressed in assessment and plan.    Safety Assessment    Throw rugs on floor.  Yes  Handrails on all stairs.  Yes  Good lighting in all hallways.  Yes  Difficulty hearing.  No  Patient counseled about all safety risks that were identified.    Functional Assessment ADLs    Are there any barriers preventing you from cooking for yourself or meeting nutritional needs?  No.    Are there any barriers preventing you from driving safely or obtaining transportation?  No.  drives   Are there any barriers preventing you from using a telephone or calling for help?  No.    Are there any barriers preventing you from shopping?  No.    Are there any barriers preventing you from taking care of your own finances?  No.    Are there any barriers preventing you from managing your medications?  No.    Are there any barriers preventing you from showering, bathing or dressing yourself?  No.    Are you currently engaging in any exercise or physical activity?  Yes.  Biking, walking, house work   What is your perception of your health?  Good.    Health Maintenance Summary                MAMMOGRAM Next Due 7/17/2018      Done 7/17/2017 MA-MAMMO SCREENING BILAT W/TOMOSYNTHESIS W/CAD     Patient has more history with this topic...    Annual Wellness Visit Next Due 12/7/2018      Done 12/6/2017 Visit Dx: Medicare annual wellness visit, subsequent     Patient has more history with this topic...    BONE DENSITY Next Due 7/11/2019      Done 7/11/2014 DS-BONE DENSITY STUDY (DEXA)    COLONOSCOPY Next Due 10/12/2025      Done 10/12/2015 REFERRAL  TO GI FOR COLONOSCOPY    IMM DTaP/Tdap/Td Vaccine Next Due 8/29/2026      Done 8/29/2016 Imm Admin: Tdap Vaccine          Patient Care Team:  NORA Hayden as PCP - General (Family Medicine)  She Mae M.D. as Consulting Physician (Nephrology)  Nitin Akhtar O.D. as Consulting Physician (Optometry)    Social History   Substance Use Topics   • Smoking status: Former Smoker     Packs/day: 0.25     Years: 12.00     Types: Cigarettes     Quit date: 1/1/1977   • Smokeless tobacco: Never Used      Comment: Started smoking at age 25 (social smoker)   • Alcohol use No     Family History   Problem Relation Age of Onset   • Heart Attack Mother    • Alcohol abuse Mother      etoh   • Lung Disease Father      smoker   • Alcohol abuse Father      etoh   • Heart Attack Father    • Hypertension Sister    • Hyperlipidemia Sister    • Lung Disease Sister      smoker   • Colon Cancer Sister    • Lung Disease Maternal Aunt    • Heart Disease Maternal Aunt    • Hypertension Maternal Aunt    • Hyperlipidemia Maternal Aunt    • Stroke Maternal Grandmother    • Other Maternal Grandmother      TB of the brain   • Stroke Maternal Grandfather    • Colon Cancer Paternal Grandmother    • No Known Problems Paternal Grandfather      She  has a past medical history of Abnormal Pap smear of cervix; CKD (chronic kidney disease), stage III (9/4/2015); Essential hypertension (9/4/2015); Other specified hypothyroidism (9/3/2015); and Tuberculosis. She also has no past medical history of Headache(784.0); Ulcer; or Urinary tract infection, site not specified.   Past Surgical History:   Procedure Laterality Date   • TONSILLECTOMY             PHYSICAL EXAM  VITAL SIGNS:   /82   Pulse 75   Temp 36.8 °C (98.3 °F)   Ht 1.524 m (5')   Wt 98 kg (216 lb)   SpO2 96%   BMI 42.18 kg/m²   Constitutional: Well developed, Well nourished, No acute distress, Non-toxic appearance.    HENT: Normocephalic, Atraumatic, Bilateral external ears  normal, Oropharynx moist, No oral exudates, Nose normal.    Eyes: PERRLA, EOMI, Conjunctiva normal, No discharge.    Neck: Normal range of motion, No tenderness, Supple, No stridor.    Lymphatic: No lymphadenopathy noted.    Cardiovascular: Regular rate and rhythm,  No murmurs, No rubs, No gallops.    Thorax & Lungs: Normal breath sounds, No respiratory distress, No wheezing, No chest tenderness.    Abdomen: Bowel sounds normal, Soft, No tenderness, No masses, No pulsatile masses.    Skin: Warm, Dry, No erythema, No rash.    Back: No tenderness, No CVA tenderness.   Extremities: Intact distal pulses, No edema, No tenderness, No cyanosis, No clubbing.    Musculoskeletal: Good range of motion in all major joints. No tenderness to palpation or major deformities noted.    Neurologic: Alert & oriented x 3, Normal motor function, Normal sensory function, No focal deficits noted.    Psychiatric: Affect normal, Judgment normal, Mood normal.     Hearing excellent.    Dentition fair  Alert, oriented in no acute distress.  Eye contact is good, speech goal directed, affect calm      Assessment and Plan. The following treatment and monitoring plan is recommended:     1. Acquired hypothyroidism  Stable, last TSH checked in December 2017 within normal limits. Continue the current levothyroxine 50 mg every morning.    2. CKD (chronic kidney disease), stage III  Stable with last EGFR in December 2017 was 58. A symptomatic. Will continue to monitor her renal function.    3. Essential hypertension  Well-controlled, continue the current medication losartan 100 mg daily.  - losartan (COZAAR) 100 MG Tab; Take 1 Tab by mouth every day.  Dispense: 90 Tab; Refill: 1    4. Hyponatremia  Resolved, last CMP was showing normal sodium.    5. Obesity, morbid, BMI 40.0-49.9 (CMS-[facility])  6. Class 3 obesity with body mass index (BMI) of 40.0 to 44.9 in adult, unspecified obesity type, unspecified whether serious comorbidity present ([facility])  7. Morbid  obesity with BMI of 40.0-44.9, adult ([facility])  Pt educated on the increase of morbidity and mortality associated with excess weight including DM, Heart Disease, HTN, stroke, and sleep apnea.  Pt advised weight loss of 5% through reduced calorie, low fat diet and 150 mins of exercise a week  Recommend obesity clinic if patient is unsuccessful with weight loss  - Patient identified as having weight management issue.  Appropriate orders and counseling given.    8. Vitamin D deficiency disease  Last vitamin D in June 2017 was 37, continue the current vitamin D supplements over-the-counter 2000 units daily.    9. Glaucoma of both eyes secondary to other eye disorder, unspecified glaucoma stage  Continue the current eyedrops as discussed in history of present illness above, follow-up with ophthalmology Dr. Akhtar.        Services suggested: No services needed at this time  Health Care Screening recommendations as per orders if indicated.  Referrals offered: PT/OT/Nutrition counseling/Behavioral Health/Smoking cessation as per orders if indicated.    Discussion today about general wellness and lifestyle habits:    · Prevent falls and reduce trip hazards; Cautioned about securing or removing rugs.  · Have a working fire alarm and carbon monoxide detector;   · Engage in regular physical activity and social activities       Follow-up: No Follow-up on file.

## 2018-06-06 ENCOUNTER — HOSPITAL ENCOUNTER (OUTPATIENT)
Dept: LAB | Facility: MEDICAL CENTER | Age: 78
End: 2018-06-06
Attending: INTERNAL MEDICINE
Payer: MEDICARE

## 2018-06-06 DIAGNOSIS — N18.30 CKD (CHRONIC KIDNEY DISEASE), STAGE III (HCC): ICD-10-CM

## 2018-06-06 LAB
25(OH)D3 SERPL-MCNC: 42 NG/ML (ref 30–100)
ANION GAP SERPL CALC-SCNC: 9 MMOL/L (ref 0–11.9)
BUN SERPL-MCNC: 16 MG/DL (ref 8–22)
CALCIUM SERPL-MCNC: 9.2 MG/DL (ref 8.5–10.5)
CHLORIDE SERPL-SCNC: 105 MMOL/L (ref 96–112)
CO2 SERPL-SCNC: 24 MMOL/L (ref 20–33)
CREAT SERPL-MCNC: 0.94 MG/DL (ref 0.5–1.4)
GLUCOSE SERPL-MCNC: 103 MG/DL (ref 65–99)
POTASSIUM SERPL-SCNC: 4 MMOL/L (ref 3.6–5.5)
SODIUM SERPL-SCNC: 138 MMOL/L (ref 135–145)

## 2018-06-06 PROCEDURE — 82306 VITAMIN D 25 HYDROXY: CPT

## 2018-06-06 PROCEDURE — 36415 COLL VENOUS BLD VENIPUNCTURE: CPT

## 2018-06-06 PROCEDURE — 80048 BASIC METABOLIC PNL TOTAL CA: CPT

## 2018-06-14 ENCOUNTER — OFFICE VISIT (OUTPATIENT)
Dept: NEPHROLOGY | Facility: MEDICAL CENTER | Age: 78
End: 2018-06-14
Payer: MEDICARE

## 2018-06-14 VITALS
SYSTOLIC BLOOD PRESSURE: 124 MMHG | HEIGHT: 60 IN | RESPIRATION RATE: 12 BRPM | WEIGHT: 217 LBS | BODY MASS INDEX: 42.6 KG/M2 | OXYGEN SATURATION: 96 % | HEART RATE: 90 BPM | TEMPERATURE: 97.4 F | DIASTOLIC BLOOD PRESSURE: 74 MMHG

## 2018-06-14 DIAGNOSIS — N18.30 CKD (CHRONIC KIDNEY DISEASE), STAGE III (HCC): ICD-10-CM

## 2018-06-14 DIAGNOSIS — E55.9 VITAMIN D DEFICIENCY DISEASE: ICD-10-CM

## 2018-06-14 DIAGNOSIS — I10 ESSENTIAL HYPERTENSION: ICD-10-CM

## 2018-06-14 PROCEDURE — 99213 OFFICE O/P EST LOW 20 MIN: CPT | Performed by: INTERNAL MEDICINE

## 2018-06-14 NOTE — PROGRESS NOTES
Subjective:      Jammie Berry is a 77 y.o. female who presents for f/u CKD III, hyponatremia          HPI  Jammie is coming today for f/u of CKD III, HTN, hyponatremia  Doing well, no complaints except chronic back pain  -off NSAID's  No difficulties to urinate, no dysuria, no hematuria.  HTN: compliant to medications -BP well controlled  CKD III:Serum creatinine level stable - at 0.9's  Hyponatremia - well controlled    Review of Systems   Constitutional:Negative for fever, chills and malaise/fatigue.   Respiratory: Negative for cough, hemoptysis, shortness of breath and wheezing.    Cardiovascular: Negative for chest pain, palpitations, orthopnea and leg swelling.   Gastrointestinal: Negative for nausea, vomiting and abdominal pain.   Genitourinary: Negative for dysuria, urgency, frequency, hematuria and flank pain.   Musculoskeletal: Positive for back pain. Negative for myalgias and neck pain.   Neurological: Negative for dizziness, sensory change, focal weakness and headaches.     PMH/FH/SH/allergies and medications reviewed     Objective:     /74   Pulse 90   Temp 36.3 °C (97.4 °F)   Resp 12   Ht 1.524 m (5')   Wt 98.4 kg (217 lb)   SpO2 96%   BMI 42.38 kg/m²      Physical Exam   Constitutional: She is oriented to person, place, and time. She appears well-developed and well-nourished. No distress.   HENT:   Head: Normocephalic and atraumatic.   Nose: Nose normal.   Mouth/Throat: Oropharynx is clear and moist.   Eyes: Conjunctivae and EOM are normal. Pupils are equal, round, and reactive to light.   Neck: Normal range of motion. Neck supple.   Cardiovascular: Normal rate and regular rhythm.  Exam reveals no gallop and no friction rub.    Pulmonary/Chest: Effort normal and breath sounds normal. She has no wheezes. She has no rales.   Abdominal: Soft. Bowel sounds are normal. She exhibits no distension. There is no tenderness.   Musculoskeletal: She exhibits no edema.   Neurological: She is  alert and oriented to person, place, and time. No cranial nerve deficit. Coordination normal.   Skin: Skin is warm. No rash noted. No erythema.             Laboratory results reviewed: d/w pt    Lab Results   Component Value Date/Time    CREATININE 0.94 06/06/2018 06:55 AM    POTASSIUM 4.0 06/06/2018 06:55 AM         Assessment/Plan:     1. CKD (chronic kidney disease), stage III      Creatinine level stable -to monitor       2. Essential hypertension      BP well controlled    3. Hyponatremia      Well controlled    4. Vit D def; well controlled  - WNL    Recs: continue current treatment             Avoid NSAID's             F/u in 6 months with BMP

## 2018-06-21 ENCOUNTER — TELEPHONE (OUTPATIENT)
Dept: MEDICAL GROUP | Facility: PHYSICIAN GROUP | Age: 78
End: 2018-06-21

## 2018-06-21 NOTE — TELEPHONE ENCOUNTER
Future Appointments       Provider Department Center    6/22/2018 1:40 PM Ina Linares M.D. [facility]    7/19/2018 8:25 AM [facility] MG [address]    12/20/2018 10:00 AM She Mae M.D. [facility] [address] [place] [address] PATIENT PRE-VISIT PLANNING     Note: Patient will not be contacted if there is no indication to call.     1.  Reviewed notes from the last few office visits within the medical group: Yes    2.  If any orders were placed at last visit or intended to be done for this visit (i.e. 6 mos follow-up), do we have Results/Consult Notes?        •  Labs - Labs ordered, NOT completed. Patient advised to complete prior to next appointment.       •  Imaging - Imaging ordered, NOT completed. Patient advised to complete prior to next appointment. scheduled        •  Referrals - No referrals were ordered at last office visit.    3. Is this appointment scheduled as a Hospital Follow-Up? No    4.  Immunizations were updated in Epic using WebIZ?: Yes       •  Web Iz Recommendations: TD and ZOSTAVAX (Shingles)    5.  Patient is due for the following Health Maintenance Topics:   There are no preventive care reminders to display for this patient.      6.  MDX printed for Provider? NO complete and compliant on 05/24/18    7.  Patient was informed to arrive 15 min prior to their scheduled appointment and bring in their medication bottles. Confirmed through automated call

## 2018-06-22 ENCOUNTER — OFFICE VISIT (OUTPATIENT)
Dept: MEDICAL GROUP | Facility: PHYSICIAN GROUP | Age: 78
End: 2018-06-22
Payer: MEDICARE

## 2018-06-22 VITALS
OXYGEN SATURATION: 94 % | HEART RATE: 96 BPM | BODY MASS INDEX: 42.6 KG/M2 | DIASTOLIC BLOOD PRESSURE: 78 MMHG | HEIGHT: 60 IN | WEIGHT: 217 LBS | TEMPERATURE: 97.8 F | SYSTOLIC BLOOD PRESSURE: 126 MMHG

## 2018-06-22 DIAGNOSIS — E03.9 ACQUIRED HYPOTHYROIDISM: ICD-10-CM

## 2018-06-22 DIAGNOSIS — I10 ESSENTIAL HYPERTENSION: ICD-10-CM

## 2018-06-22 DIAGNOSIS — N18.30 CKD (CHRONIC KIDNEY DISEASE), STAGE III (HCC): ICD-10-CM

## 2018-06-22 DIAGNOSIS — E66.01 MORBID OBESITY WITH BMI OF 40.0-44.9, ADULT (HCC): ICD-10-CM

## 2018-06-22 DIAGNOSIS — E55.9 VITAMIN D DEFICIENCY DISEASE: ICD-10-CM

## 2018-06-22 DIAGNOSIS — Z13.228 ENCOUNTER FOR SCREENING FOR METABOLIC DISORDER: ICD-10-CM

## 2018-06-22 PROCEDURE — 99214 OFFICE O/P EST MOD 30 MIN: CPT | Performed by: INTERNAL MEDICINE

## 2018-06-22 NOTE — ASSESSMENT & PLAN NOTE
This is a chronic health problem that is well controlled with current medications and lifestyle measures. Last checked in December 2017 was within normal limits, currently on levothyroxine 50 µg every morning   yes

## 2018-06-22 NOTE — ASSESSMENT & PLAN NOTE
This is a chronic health problem that is well controlled with current medications and lifestyle measures. Recent blood work in June 2018 showing normal limits, On over-the-counter vitamin D 2000 units daily.

## 2018-06-22 NOTE — PROGRESS NOTES
CC:  To establish care with new PCP, hypertension.    HISTORY OF THE PRESENT ILLNESS: Patient is a 77 y.o. female. This pleasant patient is here today to establish care with new PCP and discuss her medical conditions as mentioned in history of present illness below.    Health Maintenance: Completed      Essential hypertension  This is a chronic health problem that is well controlled with current medications and lifestyle measures. Blood pressure and office today for 126/78, currently on losartan 100 mg daily.    Acquired hypothyroidism  This is a chronic health problem that is well controlled with current medications and lifestyle measures. Last checked in December 2017 was within normal limits, currently on levothyroxine 50 µg every morning    CKD (chronic kidney disease), stage III  This is a chronic health problem that is well controlled with current medications and lifestyle measures. Last BMP check in the June 2018 showing a GFR at 58, normal creatinine. Follows nephrology .    Vitamin D deficiency disease  This is a chronic health problem that is well controlled with current medications and lifestyle measures. Recent blood work in June 2018 showing normal limits, On over-the-counter vitamin D 2000 units daily.    PHQ score 0, BMI > 42 refusing health improvement program, former tobacco, no fall injuries    Allergies: Patient has no known allergies.    Current Outpatient Prescriptions Ordered in [employer]   Medication Sig Dispense Refill   • Brimonidine Tartrate-Timolol (COMBIGAN) 0.2-0.5 % Solution by Ophthalmic route.     • bimatoprost (LUMIGAN) 0.01 % Solution Place 1 Drop in both eyes every bedtime.     • losartan (COZAAR) 100 MG Tab Take 1 Tab by mouth every day. 90 Tab 1   • levothyroxine (SYNTHROID) 50 MCG Tab Take 1 Tab by mouth every day. 90 Tab 1   • cyanocobalamin (VITAMIN B-12) 500 MCG Tab Take 500 mcg by mouth every day.     • Misc Natural Products (OSTEO BI-FLEX ADV DOUBLE ST PO) Take  by mouth.      • Probiotic Product (PROBIOTIC PO) Take  by mouth.     • Cholecalciferol (VITAMIN D-3 PO) Take 1,000 Units by mouth every day.     • therapeutic multivitamin-minerals (THERAGRAN-M) TABS Take 1 Tab by mouth every day.       No current [facility]-ordered facility-administered medications on file.        Past Medical History:   Diagnosis Date   • Abnormal Pap smear of cervix     Dr. 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       Past Surgical History:   Procedure Laterality Date   • TONSILLECTOMY         Social History   Substance Use Topics   • Smoking status: Former Smoker     Packs/day: 0.25     Years: 12.00     Types: Cigarettes     Quit date: 1/1/1977   • Smokeless tobacco: Never Used      Comment: Started smoking at age 25 (social smoker)   • Alcohol use No       Social History     Social History Narrative   • No narrative on file       Family History   Problem Relation Age of Onset   • Heart Attack Mother    • Alcohol abuse Mother      etoh   • Lung Disease Father      smoker   • Alcohol abuse Father      etoh   • Heart Attack Father    • Hypertension Sister    • Hyperlipidemia Sister    • Lung Disease Sister      smoker   • Colon Cancer Sister    • Lung Disease Maternal Aunt    • Heart Disease Maternal Aunt    • Hypertension Maternal Aunt    • Hyperlipidemia Maternal Aunt    • Stroke Maternal Grandmother    • Other Maternal Grandmother      TB of the brain   • Stroke Maternal Grandfather    • Colon Cancer Paternal Grandmother    • No Known Problems Paternal Grandfather        ROS:     - Constitutional: Negative for fever, chills, unexpected weight change, and fatigue/generalized weakness.     - HEENT: Negative for headaches, vision changes, hearing changes, ear pain, ear discharge, rhinorrhea, sinus congestion, sore throat, and neck pain.      - Respiratory:  Negative for cough, sputum production, chest congestion, dyspnea, wheezing, and crackles.      - Cardiovascular: Negative for chest pain, palpitations, orthopnea, and bilateral lower extremity edema.     - Gastrointestinal: Negative for heartburn, nausea, vomiting, abdominal pain, hematochezia, melena, diarrhea, constipation, and greasy/foul-smelling stools.     - Genitourinary: Negative for dysuria, polyuria, hematuria, pyuria, urinary urgency, and urinary incontinence.     - Musculoskeletal: Negative for myalgias, back pain, and joint pain.     - Skin: Negative for rash, itching, cyanotic skin color change.     - Neurological: Negative for dizziness, tingling, tremors, focal sensory deficit, focal weakness and headaches.     - Endo/Heme/Allergies: Does not bruise/bleed easily.     - Psychiatric/Behavioral: Negative for depression, suicidal/homicidal ideation and memory loss.      Last lab work done in June 2018 and reviewed and discussed with the patient.    Exam: Blood pressure 126/78, pulse 96, temperature 36.6 °C (97.8 °F), weight 98.4 kg (217 lb), SpO2 94 %. Body mass index is 42.38 kg/m².    General: Normal appearing. No distress.  HEENT: Normocephalic. Eyes conjunctiva clear lids without ptosis, pupils equal and reactive to light accommodation, ears normal shape and contour, canals are clear bilaterally, tympanic membranes are benign, nasal mucosa benign, oropharynx is without erythema, edema or exudates.   Neck: Supple without JVD or bruit. Thyroid is not enlarged.  Pulmonary: Clear to ausculation.  Normal effort. No rales, ronchi, or wheezing.  Cardiovascular: Regular rate and rhythm without murmur. Carotid and radial pulses are intact and equal bilaterally.  Abdomen: Soft, nontender, nondistended. Normal bowel sounds. Liver and spleen are not palpable  Neurologic: Grossly nonfocal  Lymph: No cervical, supraclavicular or axillary lymph nodes are palpable  Skin: Warm and dry.  No obvious  lesions.  Musculoskeletal: Normal gait. No extremity cyanosis, clubbing, or edema.  Psych: Normal mood and affect. Alert and oriented x3. Judgment and insight is normal.      Please note that this dictation was created using voice recognition software. I have made every reasonable attempt to correct obvious errors, but I expect that there are errors of grammar and possibly content that I did not discover before finalizing the note.      Assessment/Plan  1. Acquired hypothyroidism  Stable, continue currently with thyroxine 50 mg every morning.  - TSH WITH REFLEX TO FT4; Future    2. Essential hypertension  Well controlled, continue current losartan 100 mg daily.    3. Morbid obesity with BMI of 40.0-44.9, adult ([facility]CC)  4. Class 3 obesity with body mass index (BMI) of 40.0 to 44.9 in adult, unspecified obesity type, unspecified whether serious comorbidity present ([facility])  Pt educated on the increase of morbidity and mortality associated with excess weight including DM, Heart Disease, HTN, stroke, and sleep apnea.  Pt advised weight loss of 5% through reduced calorie, low fat diet and 150 mins of exercise a week  Recommend obesity clinic if patient is unsuccessful with weight loss.    5. Encounter for screening for metabolic disorder  Last lipid panel in 2015 within normal limits, not done after that we will recheck at this time.  - LIPID PROFILE; Future    6. CKD (chronic kidney disease), stage III  Improving, recent blood work in June 2018. Follow-up with nephrology     7. Vitamin D deficiency disease  Stable, last check in June 2018 and within normal limits. Continue over the counter vitamin D 2000 units daily.

## 2018-06-22 NOTE — ASSESSMENT & PLAN NOTE
This is a chronic health problem that is well controlled with current medications and lifestyle measures. Last BMP check in the June 2018 showing a GFR at 58, normal creatinine. Follows nephrology .

## 2018-06-22 NOTE — ASSESSMENT & PLAN NOTE
This is a chronic health problem that is well controlled with current medications and lifestyle measures. Blood pressure and office today for 126/78, currently on losartan 100 mg daily.

## 2018-07-19 ENCOUNTER — HOSPITAL ENCOUNTER (OUTPATIENT)
Dept: RADIOLOGY | Facility: MEDICAL CENTER | Age: 78
End: 2018-07-19
Attending: INTERNAL MEDICINE
Payer: MEDICARE

## 2018-07-19 DIAGNOSIS — Z12.31 SCREENING MAMMOGRAM, ENCOUNTER FOR: ICD-10-CM

## 2018-07-19 PROCEDURE — 77063 BREAST TOMOSYNTHESIS BI: CPT

## 2018-07-20 ENCOUNTER — TELEPHONE (OUTPATIENT)
Dept: MEDICAL GROUP | Facility: PHYSICIAN GROUP | Age: 78
End: 2018-07-20

## 2018-07-20 NOTE — TELEPHONE ENCOUNTER
----- Message from Ina Linares M.D. sent at 7/20/2018 12:07 AM PDT -----  I reviewed the results of your mammogram. Radiologist reports normal findings. I recommend recheck in one year.Thanks.  Ina Linares M.D.

## 2018-11-25 DIAGNOSIS — I10 ESSENTIAL HYPERTENSION: ICD-10-CM

## 2018-11-27 NOTE — TELEPHONE ENCOUNTER
Was the patient seen in the last year in this department? Yes    Does patient have an active prescription for medications requested? No     Received Request Via: Pharmacy      Pt met protocol?: Yes    Pt last ov 6/18    BP Readings from Last 1 Encounters:   06/22/18 126/78

## 2018-11-28 RX ORDER — LOSARTAN POTASSIUM 100 MG/1
TABLET ORAL
Qty: 90 TAB | Refills: 1 | Status: SHIPPED | OUTPATIENT
Start: 2018-11-28 | End: 2019-05-21 | Stop reason: SDUPTHER

## 2018-11-28 NOTE — TELEPHONE ENCOUNTER
Refill X 6 months, sent to pharmacy.Pt. Seen in the last 6 months per protocol.   Lab Results   Component Value Date/Time    SODIUM 138 06/06/2018 06:55 AM    POTASSIUM 4.0 06/06/2018 06:55 AM    CHLORIDE 105 06/06/2018 06:55 AM    CO2 24 06/06/2018 06:55 AM    GLUCOSE 103 (H) 06/06/2018 06:55 AM    BUN 16 06/06/2018 06:55 AM    CREATININE 0.94 06/06/2018 06:55 AM

## 2018-12-13 ENCOUNTER — HOSPITAL ENCOUNTER (OUTPATIENT)
Dept: LAB | Facility: MEDICAL CENTER | Age: 78
End: 2018-12-13
Attending: INTERNAL MEDICINE
Payer: MEDICARE

## 2018-12-13 DIAGNOSIS — N18.30 CKD (CHRONIC KIDNEY DISEASE), STAGE III (HCC): ICD-10-CM

## 2018-12-13 DIAGNOSIS — Z13.228 ENCOUNTER FOR SCREENING FOR METABOLIC DISORDER: ICD-10-CM

## 2018-12-13 DIAGNOSIS — I10 ESSENTIAL HYPERTENSION: ICD-10-CM

## 2018-12-13 DIAGNOSIS — E03.9 ACQUIRED HYPOTHYROIDISM: ICD-10-CM

## 2018-12-13 LAB
ANION GAP SERPL CALC-SCNC: 7 MMOL/L (ref 0–11.9)
BUN SERPL-MCNC: 15 MG/DL (ref 8–22)
CALCIUM SERPL-MCNC: 9.4 MG/DL (ref 8.5–10.5)
CHLORIDE SERPL-SCNC: 103 MMOL/L (ref 96–112)
CHOLEST SERPL-MCNC: 159 MG/DL (ref 100–199)
CO2 SERPL-SCNC: 27 MMOL/L (ref 20–33)
CREAT SERPL-MCNC: 1.02 MG/DL (ref 0.5–1.4)
CREAT UR-MCNC: 125.7 MG/DL
GLUCOSE SERPL-MCNC: 92 MG/DL (ref 65–99)
HDLC SERPL-MCNC: 62 MG/DL
LDLC SERPL CALC-MCNC: 82 MG/DL
MICROALBUMIN UR-MCNC: <0.7 MG/DL
MICROALBUMIN/CREAT UR: NORMAL MG/G (ref 0–30)
POTASSIUM SERPL-SCNC: 4.1 MMOL/L (ref 3.6–5.5)
SODIUM SERPL-SCNC: 137 MMOL/L (ref 135–145)
TRIGL SERPL-MCNC: 77 MG/DL (ref 0–149)
TSH SERPL DL<=0.005 MIU/L-ACNC: 4.02 UIU/ML (ref 0.38–5.33)

## 2018-12-13 PROCEDURE — 80061 LIPID PANEL: CPT

## 2018-12-13 PROCEDURE — 80048 BASIC METABOLIC PNL TOTAL CA: CPT

## 2018-12-13 PROCEDURE — 84443 ASSAY THYROID STIM HORMONE: CPT

## 2018-12-13 PROCEDURE — 36415 COLL VENOUS BLD VENIPUNCTURE: CPT

## 2018-12-13 PROCEDURE — 82570 ASSAY OF URINE CREATININE: CPT

## 2018-12-13 PROCEDURE — 82043 UR ALBUMIN QUANTITATIVE: CPT

## 2018-12-14 ENCOUNTER — TELEPHONE (OUTPATIENT)
Dept: NEPHROLOGY | Facility: MEDICAL CENTER | Age: 78
End: 2018-12-14

## 2018-12-14 NOTE — TELEPHONE ENCOUNTER
Pt is rescheduled for 5/09/18. I let her know that her labs are stable like you and I discussed. She does not need you to call her at this time. She will wait until her appointment. Can you please order labs for the patient to do before her next appointment?    Thank you.

## 2018-12-15 RX ORDER — LEVOTHYROXINE SODIUM 0.05 MG/1
TABLET ORAL
Qty: 90 TAB | Refills: 1 | Status: SHIPPED | OUTPATIENT
Start: 2018-12-15 | End: 2019-01-02 | Stop reason: SDUPTHER

## 2018-12-15 NOTE — TELEPHONE ENCOUNTER
Was the patient seen in the last year in this department? Yes    Does patient have an active prescription for medications requested? No     Received Request Via: Pharmacy      Pt met protocol?: Yes  OV 6/18   TSH 12/14

## 2018-12-17 DIAGNOSIS — N18.30 CKD (CHRONIC KIDNEY DISEASE), STAGE III (HCC): ICD-10-CM

## 2018-12-17 DIAGNOSIS — E55.9 VITAMIN D DEFICIENCY DISEASE: ICD-10-CM

## 2018-12-17 DIAGNOSIS — I10 ESSENTIAL HYPERTENSION: ICD-10-CM

## 2018-12-20 ENCOUNTER — APPOINTMENT (OUTPATIENT)
Dept: NEPHROLOGY | Facility: MEDICAL CENTER | Age: 78
End: 2018-12-20
Payer: MEDICARE

## 2019-01-02 RX ORDER — LEVOTHYROXINE SODIUM 0.05 MG/1
50 TABLET ORAL
Qty: 90 TAB | Refills: 1 | Status: SHIPPED | OUTPATIENT
Start: 2019-01-02 | End: 2019-06-26 | Stop reason: SDUPTHER

## 2019-01-03 ENCOUNTER — TELEPHONE (OUTPATIENT)
Dept: MEDICAL GROUP | Facility: PHYSICIAN GROUP | Age: 79
End: 2019-01-03

## 2019-01-03 NOTE — TELEPHONE ENCOUNTER
Future Appointments       Provider Department Center    1/4/2019 11:00 AM Ina Linares M.D. [facility]    5/9/2019 10:00 AM She Mae M.D. [facility] [address] [place] [address] PATIENT PRE-VISIT PLANNING     Patient was contacted to complete PVP.     Note: Patient will not be contacted if there is no indication to call.     1.  Reviewed notes from the last few office visits within the medical group: Yes    2.  If any orders were placed at last visit or intended to be done for this visit (i.e. 6 mos follow-up), do we have Results/Consult Notes?        •  Labs - Labs ordered, completed on 12/13/18 and results are in chart.       •  Imaging - Imaging ordered, completed and results are in chart.       •  Referrals - No referrals were ordered at last office visit.    3. Is this appointment scheduled as a Hospital Follow-Up? No    4.  Immunizations were updated in Epic using WebIZ?: Yes       •  Web Iz Recommendations: FLU, TD and SHINGRIX (Shingles)    5.  Patient is due for the following Health Maintenance Topics:   Health Maintenance Due   Topic Date Due   • IMM ZOSTER VACCINES (2 of 3) 06/08/2017   • IMM INFLUENZA (1) 09/01/2018   • Annual Wellness Visit  12/07/2018       6.  AHA (MDX) form printed for Provider? YES    7.  Patient was informed to arrive 15 min prior to their scheduled appointment and bring in their medication bottles.

## 2019-04-29 ENCOUNTER — HOSPITAL ENCOUNTER (OUTPATIENT)
Dept: LAB | Facility: MEDICAL CENTER | Age: 79
End: 2019-04-29
Attending: INTERNAL MEDICINE
Payer: MEDICARE

## 2019-04-29 DIAGNOSIS — I10 ESSENTIAL HYPERTENSION: ICD-10-CM

## 2019-04-29 DIAGNOSIS — N18.30 CKD (CHRONIC KIDNEY DISEASE), STAGE III (HCC): ICD-10-CM

## 2019-04-29 LAB
ANION GAP SERPL CALC-SCNC: 9 MMOL/L (ref 0–11.9)
BUN SERPL-MCNC: 22 MG/DL (ref 8–22)
CALCIUM SERPL-MCNC: 9.4 MG/DL (ref 8.5–10.5)
CHLORIDE SERPL-SCNC: 104 MMOL/L (ref 96–112)
CO2 SERPL-SCNC: 26 MMOL/L (ref 20–33)
CREAT SERPL-MCNC: 1.04 MG/DL (ref 0.5–1.4)
GLUCOSE SERPL-MCNC: 99 MG/DL (ref 65–99)
POTASSIUM SERPL-SCNC: 4.8 MMOL/L (ref 3.6–5.5)
SODIUM SERPL-SCNC: 139 MMOL/L (ref 135–145)

## 2019-04-29 PROCEDURE — 82306 VITAMIN D 25 HYDROXY: CPT

## 2019-04-29 PROCEDURE — 80048 BASIC METABOLIC PNL TOTAL CA: CPT

## 2019-04-29 PROCEDURE — 36415 COLL VENOUS BLD VENIPUNCTURE: CPT

## 2019-04-30 LAB — 25(OH)D3 SERPL-MCNC: 45 NG/ML (ref 30–100)

## 2019-05-09 ENCOUNTER — OFFICE VISIT (OUTPATIENT)
Dept: NEPHROLOGY | Facility: MEDICAL CENTER | Age: 79
End: 2019-05-09
Payer: MEDICARE

## 2019-05-09 VITALS
OXYGEN SATURATION: 96 % | SYSTOLIC BLOOD PRESSURE: 118 MMHG | RESPIRATION RATE: 14 BRPM | WEIGHT: 221 LBS | DIASTOLIC BLOOD PRESSURE: 76 MMHG | BODY MASS INDEX: 43.39 KG/M2 | TEMPERATURE: 97.6 F | HEIGHT: 60 IN | HEART RATE: 102 BPM

## 2019-05-09 DIAGNOSIS — I10 ESSENTIAL HYPERTENSION: ICD-10-CM

## 2019-05-09 DIAGNOSIS — N18.30 CKD (CHRONIC KIDNEY DISEASE), STAGE III (HCC): ICD-10-CM

## 2019-05-09 DIAGNOSIS — E55.9 VITAMIN D DEFICIENCY DISEASE: ICD-10-CM

## 2019-05-09 DIAGNOSIS — D64.9 ANEMIA, UNSPECIFIED TYPE: ICD-10-CM

## 2019-05-09 PROCEDURE — 99214 OFFICE O/P EST MOD 30 MIN: CPT | Performed by: INTERNAL MEDICINE

## 2019-05-09 ASSESSMENT — ENCOUNTER SYMPTOMS
HEMOPTYSIS: 0
ORTHOPNEA: 0
NAUSEA: 0
ABDOMINAL PAIN: 0
SINUS PAIN: 0
WEIGHT LOSS: 0
COUGH: 0
FLANK PAIN: 0
EYES NEGATIVE: 1
SHORTNESS OF BREATH: 0
CHILLS: 0
VOMITING: 0
FEVER: 0
PALPITATIONS: 0
WHEEZING: 0

## 2019-05-09 NOTE — PROGRESS NOTES
Subjective:      Jammie Berry is a 78 y.o. female who presents with Chronic Kidney Disease and Hypertension            HPI  Jammie is coming today for f/u of CKD III, HTN  Doing well , no complaints  No difficulties to urinate  No dysuria/hematuria/flank pain  No edema  CKD III -creat level stable at baseline  HTN:BP well controlled, compliant to low Na diet, medications    Review of Systems   Constitutional: Negative for chills, fever, malaise/fatigue and weight loss.   HENT: Negative for congestion, hearing loss and sinus pain.    Eyes: Negative.    Respiratory: Negative for cough, hemoptysis, shortness of breath and wheezing.    Cardiovascular: Negative for chest pain, palpitations, orthopnea and leg swelling.   Gastrointestinal: Negative for abdominal pain, nausea and vomiting.   Genitourinary: Negative for dysuria, flank pain, frequency, hematuria and urgency.   Skin: Negative.    All other systems reviewed and are negative.    Past Medical History:   Diagnosis Date   • Abnormal Pap smear of cervix     Dr. 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       Family History   Problem Relation Age of Onset   • Heart Attack Mother    • Alcohol abuse Mother         etoh   • Lung Disease Father         smoker   • Alcohol abuse Father         etoh   • Heart Attack Father    • Hypertension Sister    • Hyperlipidemia Sister    • Lung Disease Sister         smoker   • Colon Cancer Sister    • Lung Disease Maternal Aunt    • Heart Disease Maternal Aunt    • Hypertension Maternal Aunt    • Hyperlipidemia Maternal Aunt    • Stroke Maternal Grandmother    • Other Maternal Grandmother         TB of the brain   • Stroke Maternal Grandfather    • Colon Cancer Paternal Grandmother    • No Known Problems Paternal Grandfather        Social History     Social History   •  Marital status:      Spouse name: N/A   • Number of children: 0   • Years of education: N/A     Occupational History   • retired CNA      Social History Main Topics   • Smoking status: Former Smoker     Packs/day: 0.25     Years: 12.00     Types: Cigarettes     Quit date: 1/1/1977   • Smokeless tobacco: Never Used      Comment: Started smoking at age 25 (social smoker)   • Alcohol use No   • Drug use: No   • Sexual activity: No      Comment: vaginal atrophy-dysparunia     Other Topics Concern   • Not on file     Social History Narrative   • No narrative on file          Objective:     /76 (BP Location: Right arm, Patient Position: Sitting)   Pulse (!) 102   Temp 36.4 °C (97.6 °F)   Resp 14   Ht 1.524 m (5')   Wt 100.2 kg (221 lb)   SpO2 96%   BMI 43.16 kg/m²      Physical Exam   Constitutional: She is oriented to person, place, and time. She appears well-developed and well-nourished. No distress.   HENT:   Head: Normocephalic and atraumatic.   Nose: Nose normal.   Mouth/Throat: Oropharynx is clear and moist.   Eyes: Pupils are equal, round, and reactive to light. EOM are normal.   Cardiovascular: Normal rate, regular rhythm and intact distal pulses.  Exam reveals no gallop and no friction rub.    Pulmonary/Chest: Effort normal and breath sounds normal. No respiratory distress. She has no wheezes. She has no rales.   Abdominal: Bowel sounds are normal. She exhibits no distension. There is no tenderness. There is no guarding.   Musculoskeletal: She exhibits no edema.   Neurological: She is alert and oriented to person, place, and time. No cranial nerve deficit. Coordination normal.   Skin: Skin is warm. She is not diaphoretic. No erythema. No pallor.   Vitals reviewed.            Laboratory results reviewed; d/w pt  Lab Results   Component Value Date/Time    CREATININE 1.04 04/29/2019 07:23 AM    POTASSIUM 4.8 04/29/2019 07:23 AM       Assessment/Plan:     1. CKD (chronic kidney disease), stage III  (HCC)    Creat level stable at baseline  - Basic Metabolic Panel; Future  - URINALYSIS; Future  - MICROALB/CREAT RATIO, RANDOM UR    2. Essential hypertension      BP well controlled  - Basic Metabolic Panel; Future  - MICROALB/CREAT RATIO, RANDOM UR    3. Vitamin D deficiency disease      Vit D level well controlled WNL    4. Anemia, unspecified type      Hb to monitor -complete CBC prior next visit    Recs: continue current treatment, low na diet, keep well hydrated, avoid NSAID's             F/u in 6 months

## 2019-05-21 DIAGNOSIS — I10 ESSENTIAL HYPERTENSION: ICD-10-CM

## 2019-05-21 RX ORDER — LOSARTAN POTASSIUM 100 MG/1
TABLET ORAL
Qty: 100 TAB | Refills: 0 | Status: SHIPPED | OUTPATIENT
Start: 2019-05-21 | End: 2019-06-04

## 2019-05-21 NOTE — TELEPHONE ENCOUNTER
*PER NEW INS PROTOCOL, NEEDS TO BE DONE FOR 100DAYS SUPPLY*    Was the patient seen in the last year in this department? Yes    Does patient have an active prescription for medications requested? No     Received Request Via: Pharmacy      Pt met protocol?: Yes    LAST OV 06/12/2018 NEXT APPT 06/04/2019 WITH PCP    BP Readings from Last 1 Encounters:   05/09/19 118/76     Lab Results   Component Value Date/Time    SODIUM 139 04/29/2019 07:23 AM    POTASSIUM 4.8 04/29/2019 07:23 AM    CHLORIDE 104 04/29/2019 07:23 AM    CO2 26 04/29/2019 07:23 AM    GLUCOSE 99 04/29/2019 07:23 AM    BUN 22 04/29/2019 07:23 AM    CREATININE 1.04 04/29/2019 07:23 AM

## 2019-05-24 ENCOUNTER — TELEPHONE (OUTPATIENT)
Dept: MEDICAL GROUP | Facility: PHYSICIAN GROUP | Age: 79
End: 2019-05-24

## 2019-05-24 NOTE — TELEPHONE ENCOUNTER
Future Appointments       Provider Department Center    6/4/2019 10:00 AM Ina Linares M.D.; [facility]        ANNUAL WELLNESS VISIT PRE-VISIT PLANNING WITHOUT OUTREACH    1.  Reviewed note from last office visit with PCP: YES    2.  If any orders were placed at last visit, do we have Results/Consult Notes?        •  Labs - Labs ordered, NOT completed. Patient advised to complete prior to next appointment.       •  Imaging - Imaging was not ordered at last office visit.       •  Referrals - No referrals were ordered at last office visit.    3.  Immunizations were updated in [employer] using WebIZ?: Yes       •  WebIZ Recommendations: FLU, TD, SHINGRIX (Shingles) and Varicella         •  Is patient due for Tdap? NO       •  Is patient due for Shingrix? YES. Patient was not notified of copay/out of pocket cost.     4.  Patient is due for the following Health Maintenance Topics:   Health Maintenance Due   Topic Date Due   • IMM ZOSTER VACCINES (2 of 3) 06/08/2017       5.  Reviewed/Updated the following with patient:       •   Preferred Pharmacy? YES       •   Preferred Lab? YES       •   Preferred Communication? YES       •   Allergies? YES       •   Medications? NO       •   Social History? YES. Was Abstract Encounter opened and chart updated? YES       •   Family History (document living status of immediate family members and if + hx of  cancer, diabetes, hypertension, hyperlipidemia, heart attack, stroke) YES. Was Abstract Encounter opened and chart updated? YES    6.  Care Team Updated:       •   DME Company (gait device, O2, CPAP, etc.): YES       •   Other Specialists (eye doctor, derm, GYN, cardiology, endo, etc): YES    7. Orders for overdue Health Maintenance topics pended in Pre-Charting? NO and N\A    8.  Patient has the following Care Path diagnoses on Problem List:  NONE    9.  Patient was advised: “This is a free wellness visit. The provider will  screen for medical conditions to help you stay healthy. If you have other concerns to address you may be asked to discuss these at a separate visit or there may be an additional fee.”     10.  Patient was informed to arrive 15 min prior to their scheduled appointment and bring in their medication bottles.

## 2019-06-04 ENCOUNTER — OFFICE VISIT (OUTPATIENT)
Dept: MEDICAL GROUP | Facility: PHYSICIAN GROUP | Age: 79
End: 2019-06-04
Payer: MEDICARE

## 2019-06-04 VITALS
BODY MASS INDEX: 43 KG/M2 | TEMPERATURE: 98.7 F | SYSTOLIC BLOOD PRESSURE: 124 MMHG | DIASTOLIC BLOOD PRESSURE: 84 MMHG | WEIGHT: 219 LBS | HEART RATE: 89 BPM | HEIGHT: 60 IN | OXYGEN SATURATION: 96 %

## 2019-06-04 DIAGNOSIS — H40.53X0 GLAUCOMA OF BOTH EYES SECONDARY TO OTHER EYE DISORDER, UNSPECIFIED GLAUCOMA STAGE: ICD-10-CM

## 2019-06-04 DIAGNOSIS — E03.9 ACQUIRED HYPOTHYROIDISM: ICD-10-CM

## 2019-06-04 DIAGNOSIS — E66.01 OBESITY, MORBID, BMI 40.0-49.9 (HCC): ICD-10-CM

## 2019-06-04 DIAGNOSIS — E66.01 MORBID OBESITY WITH BMI OF 40.0-44.9, ADULT (HCC): ICD-10-CM

## 2019-06-04 DIAGNOSIS — E66.01 CLASS 3 SEVERE OBESITY WITH BODY MASS INDEX (BMI) OF 40.0 TO 44.9 IN ADULT, UNSPECIFIED OBESITY TYPE, UNSPECIFIED WHETHER SERIOUS COMORBIDITY PRESENT (HCC): ICD-10-CM

## 2019-06-04 DIAGNOSIS — I10 ESSENTIAL HYPERTENSION: ICD-10-CM

## 2019-06-04 DIAGNOSIS — N18.30 CKD (CHRONIC KIDNEY DISEASE), STAGE III (HCC): ICD-10-CM

## 2019-06-04 DIAGNOSIS — E55.9 VITAMIN D DEFICIENCY DISEASE: ICD-10-CM

## 2019-06-04 PROCEDURE — 8041 PR SCP AHA: Performed by: INTERNAL MEDICINE

## 2019-06-04 PROCEDURE — G0439 PPPS, SUBSEQ VISIT: HCPCS | Performed by: INTERNAL MEDICINE

## 2019-06-04 RX ORDER — TELMISARTAN 40 MG/1
40 TABLET ORAL DAILY
Qty: 30 TAB | Refills: 3 | Status: CANCELLED | OUTPATIENT
Start: 2019-06-04

## 2019-06-04 RX ORDER — IRBESARTAN 300 MG/1
300 TABLET ORAL DAILY
Qty: 90 TAB | Refills: 1 | Status: SHIPPED | OUTPATIENT
Start: 2019-06-04 | End: 2019-09-17 | Stop reason: SDUPTHER

## 2019-06-04 ASSESSMENT — ENCOUNTER SYMPTOMS: GENERAL WELL-BEING: GOOD

## 2019-06-04 ASSESSMENT — ACTIVITIES OF DAILY LIVING (ADL)
TRANSPORTATION COMMENTS: HUSBAND DRIVES
BATHING_REQUIRES_ASSISTANCE: 0

## 2019-06-04 ASSESSMENT — PATIENT HEALTH QUESTIONNAIRE - PHQ9: CLINICAL INTERPRETATION OF PHQ2 SCORE: 0

## 2019-06-04 NOTE — PROGRESS NOTES
Chief Complaint   Patient presents with   • Annual Wellness Visit         HPI:  Jammie is a 78 y.o. here for Medicare Annual Wellness Visit        Patient Active Problem List    Diagnosis Date Noted   • Obesity, morbid, BMI 40.0-49.9 (CMS-[facility]) 04/17/2015     Priority: High   • Morbid obesity with BMI of 40.0-44.9, adult ([facility]) 05/24/2018   • Glaucoma of both eyes 05/24/2018   • Healthcare maintenance 05/24/2018   • Vitamin D deficiency disease 02/11/2016   • CKD (chronic kidney disease), stage III ([facility]) 09/04/2015   • Essential hypertension 09/04/2015   • Acquired hypothyroidism 09/03/2015   • Abnormal Pap smear of cervix 07/02/2014       Current Outpatient Prescriptions   Medication Sig Dispense Refill   • irbesartan (AVAPRO) 300 MG Tab Take 1 Tab by mouth every day. 90 Tab 1   • levothyroxine (SYNTHROID) 50 MCG Tab Take 1 Tab by mouth every day. 90 Tab 1   • cyanocobalamin (VITAMIN B-12) 500 MCG Tab Take 500 mcg by mouth every day.     • Misc Natural Products (OSTEO BI-FLEX ADV DOUBLE ST PO) Take  by mouth.     • Probiotic Product (PROBIOTIC PO) Take  by mouth.     • Cholecalciferol (VITAMIN D-3 PO) Take 1,000 Units by mouth every day.     • therapeutic multivitamin-minerals (THERAGRAN-M) TABS Take 1 Tab by mouth every day.       No current facility-administered medications for this visit.         Patient is taking medications as noted in medication list.  Current supplements as per medication list.     Allergies: Patient has no known allergies.    Current social contact/activities: Visit with family and friends and lunch with club      Is patient current with immunizations? No, due for SHINGRIX (Shingles). Patient is interested in receiving NONE today.    She  reports that she quit smoking about 42 years ago. Her smoking use included Cigarettes. She has a 3.00 pack-year smoking history. She has never used smokeless tobacco. She reports that she does not drink alcohol or use drugs.  Counseling given: Not  Answered        DPA/Advanced directive: Patient has Advanced Directive on file.     ROS:    Gait: Uses no assistive device   Ostomy: No   Other tubes: No   Amputations: No   Chronic oxygen use No   Last eye exam 2017   Wears hearing aids: No   : Denies any urinary leakage during the last 6 months        Depression Screening    Little interest or pleasure in doing things?  0 - not at all  Feeling down, depressed, or hopeless? 0 - not at all  Patient Health Questionnaire Score: 0    If depressive symptoms identified deferred to follow up visit unless specifically addressed in assessment and plan.    Interpretation of PHQ-9 Total Score   Score Severity   1-4 No Depression   5-9 Mild Depression   10-14 Moderate Depression   15-19 Moderately Severe Depression   20-27 Severe Depression    Screening for Cognitive Impairment    Three Minute Recall (village, kitchen, baby)  3/3 Village kitchen baby   Bryan clock face with all 12 numbers and set the hands to show 10 past 10.  Yes 10:10 4/5  If cognitive concerns identified, deferred for follow up unless specifically addressed in assessment and plan.    Fall Risk Assessment    Has the patient had two or more falls in the last year or any fall with injury in the last year?  No  If fall risk identified, deferred for follow up unless specifically addressed in assessment and plan.    Safety Assessment    Throw rugs on floor.  Yes  Handrails on all stairs.  Yes  Good lighting in all hallways.  Yes  Difficulty hearing.  No  Patient counseled about all safety risks that were identified.    Functional Assessment ADLs    Are there any barriers preventing you from cooking for yourself or meeting nutritional needs?  No.    Are there any barriers preventing you from driving safely or obtaining transportation?  No.  drives   Are there any barriers preventing you from using a telephone or calling for help?  No.    Are there any barriers preventing you from shopping?  No.    Are  there any barriers preventing you from taking care of your own finances?  No.    Are there any barriers preventing you from managing your medications?  No.    Are there any barriers preventing you from showering, bathing or dressing yourself?  No.    Are you currently engaging in any exercise or physical activity?  Yes.  Biking, walking   What is your perception of your health?  Good.    Health Maintenance Summary                IMM ZOSTER VACCINES Overdue 6/8/2017      Done 4/13/2017 Imm Admin: Zoster Vaccine Live (ZVL) (Zostavax)    Annual Wellness Visit Overdue 12/7/2018      Done 12/6/2017 Visit Dx: Medicare annual wellness visit, subsequent     Patient has more history with this topic...    BONE DENSITY Next Due 7/11/2019      Done 7/11/2014 DS-BONE DENSITY STUDY (DEXA)    MAMMOGRAM Next Due 7/19/2019      Done 7/19/2018 MA-MAMMO SCREENING BILAT W/TOMOSYNTHESIS W/CAD     Patient has more history with this topic...    IMM INFLUENZA Next Due 9/1/2019      Done 12/6/2017 Imm Admin: Influenza Vaccine Adult HD     Patient has more history with this topic...    COLONOSCOPY Next Due 10/12/2025      Done 10/12/2015 REFERRAL TO GI FOR COLONOSCOPY    IMM DTaP/Tdap/Td Vaccine Next Due 8/29/2026      Done 8/29/2016 Imm Admin: Tdap Vaccine          Patient Care Team:  Ina Linares M.D. as PCP - General (Internal Medicine)  She Mae M.D. as Consulting Physician (Nephrology)  Nitin Akhtar O.D. as Consulting Physician (Optometry)    Social History   Substance Use Topics   • Smoking status: Former Smoker     Packs/day: 0.25     Years: 12.00     Types: Cigarettes     Quit date: 1/1/1977   • Smokeless tobacco: Never Used      Comment: Started smoking at age 25 (social smoker)   • Alcohol use No     Family History   Problem Relation Age of Onset   • Heart Attack Mother    • Alcohol abuse Mother         etoh   • Lung Disease Father         smoker   • Alcohol abuse Father         etoh   • Heart Attack Father    •  Hypertension Sister    • Hyperlipidemia Sister    • Lung Disease Sister         smoker   • Colon Cancer Sister    • Lung Disease Maternal Aunt    • Heart Disease Maternal Aunt    • Hypertension Maternal Aunt    • Hyperlipidemia Maternal Aunt    • Stroke Maternal Grandmother    • Other Maternal Grandmother         TB of the brain   • Stroke Maternal Grandfather    • Colon Cancer Paternal Grandmother    • No Known Problems Paternal Grandfather      She  has a past medical history of Abnormal Pap smear of cervix; CKD (chronic kidney disease), stage III (HCC) (9/4/2015); Essential hypertension (9/4/2015); Other specified hypothyroidism (9/3/2015); and Tuberculosis. She also has no past medical history of Headache(784.0); Ulcer; or Urinary tract infection, site not specified.   Past Surgical History:   Procedure Laterality Date   • TONSILLECTOMY         PHYSICAL EXAM  VITAL SIGNS:   /84 (BP Location: Right arm, Patient Position: Sitting, BP Cuff Size: Large adult)   Pulse 89   Temp 37.1 °C (98.7 °F)   Ht 1.524 m (5')   Wt 99.3 kg (219 lb)   SpO2 96%   BMI 42.77 kg/m²   Constitutional: Well developed, Well nourished, No acute distress, Non-toxic appearance.    HENT: Normocephalic, Atraumatic, Bilateral external ears normal, Oropharynx moist, No oral exudates, Nose normal.    Eyes: PERRLA, EOMI, Conjunctiva normal, No discharge.    Neck: Normal range of motion, No tenderness, Supple, No stridor.    Lymphatic: No lymphadenopathy noted.    Cardiovascular: Regular rate and rhythm,  No murmurs, No rubs, No gallops.    Thorax & Lungs: Normal breath sounds, No respiratory distress, No wheezing, No chest tenderness.    Abdomen: Bowel sounds normal, Soft, No tenderness, No masses, No pulsatile masses.    Skin: Warm, Dry, No erythema, No rash.    Back: No tenderness, No CVA tenderness.   Extremities: Intact distal pulses, No edema, No tenderness, No cyanosis, No clubbing.    Musculoskeletal: Good range of motion in all  major joints. No tenderness to palpation or major deformities noted.    Neurologic: Alert & oriented x 3, Normal motor function, Normal sensory function, No focal deficits noted.    Psychiatric: Affect normal, Judgment normal, Mood normal.     Hearing good.    Dentition fair  Alert, oriented in no acute distress.  Eye contact is good, speech goal directed, affect calm      Assessment and Plan. The following treatment and monitoring plan is recommended:     1. Essential hypertension  Well-controlled, as her current losartan is being recalled by the pharmacy I have reviewed the letter and changed it to a irbesartan 300 mg daily which is equivalent of her current losartan 100 mg daily.  Given instructions to check her blood pressure every day and get me the BP log for next 5 days at the  as she is not on my chart which she understood and agreed.  - irbesartan (AVAPRO) 300 MG Tab; Take 1 Tab by mouth every day.  Dispense: 90 Tab; Refill: 1    2. Obesity, morbid, BMI 40.0-49.9 (CMS-[facility])  3. Morbid obesity with BMI of 40.0-44.9, adult ([facility])  4. Class 3 severe obesity with body mass index (BMI) of 40.0 to 44.9 in adult, unspecified obesity type, unspecified whether serious comorbidity present ([facility])  Pt educated on the increase of morbidity and mortality associated with excess weight including DM, Heart Disease, HTN, stroke, and sleep apnea.  Pt advised weight loss of 5% through reduced calorie, low fat diet and 150 mins of exercise a week  Recommend obesity clinic if patient is unsuccessful with weight loss    5. Glaucoma of both eyes secondary to other eye disorder, unspecified glaucoma stage  Well-controlled, continue to follow-up with ophthalmology.    6. CKD (chronic kidney disease), stage III ([facility])  Stable, reviewed the recent nephrology note with Dr. Mae, recent renal function remaining at baseline EGFR 51.  To avoid nephrotoxins including NSAIDs.    7. Acquired hypothyroidism  Stable, continue current  levothyroxine 50 mcg nightly.  Last checked in 1 year back, will recheck TSH and adjust the dose if needed.  - TSH WITH REFLEX TO FT4; Future    8. Vitamin D deficiency disease  Stable, continue current over-the-counter vitamin D 2000 units daily.  Will recheck and replete if needed.  Orders already placed by nephrologist.    Services suggested: No services needed at this time  Health Care Screening recommendations as per orders if indicated.  Referrals offered: PT/OT/Nutrition counseling/Behavioral Health/Smoking cessation as per orders if indicated.    Discussion today about general wellness and lifestyle habits:    · Prevent falls and reduce trip hazards; Cautioned about securing or removing rugs.  · Have a working fire alarm and carbon monoxide detector;   · Engage in regular physical activity and social activities       Follow-up: Return in about 4 months (around 10/4/2019), or if symptoms worsen or fail to improve.

## 2019-06-26 RX ORDER — LEVOTHYROXINE SODIUM 0.05 MG/1
TABLET ORAL
Qty: 90 TAB | Refills: 1 | Status: SHIPPED | OUTPATIENT
Start: 2019-06-26 | End: 2020-01-02

## 2019-06-26 NOTE — TELEPHONE ENCOUNTER
Was the patient seen in the last year in this department? Yes    Does patient have an active prescription for medications requested? No     Received Request Via: Pharmacy    Pt met protocol?: Yes     Last OV 06/04/19    TSH   Date Value Ref Range Status   12/13/2018 4.020 0.380 - 5.330 uIU/mL Final     Comment:     Please note new reference ranges effective 12/14/2017 10:00 AM  Pregnant Females, 1st Trimester  0.050-3.700  Pregnant Females, 2nd Trimester  0.310-4.350  Pregnant Females, 3rd Trimester  0.410-5.180

## 2019-07-11 ENCOUNTER — TELEPHONE (OUTPATIENT)
Dept: MEDICAL GROUP | Facility: PHYSICIAN GROUP | Age: 79
End: 2019-07-11

## 2019-07-11 NOTE — TELEPHONE ENCOUNTER
Future Appointments       Provider Department Center    7/12/2019 9:00 AM Ina Linares M.D. [facility]    7/23/2019 1:20 PM [facility] MG 3 [facility] [address] [place] [address]    10/3/2019 10:00 AM Ina Linares M.D. [facility]        ESTABLISHED PATIENT PRE-VISIT PLANNING     Patient was NOT contacted to complete PVP.     Note: Patient will not be contacted if there is no indication to call.     1.  Reviewed notes from the last few office visits within the medical group: Yes    2.  If any orders were placed at last visit or intended to be done for this visit (i.e. 6 mos follow-up), do we have Results/Consult Notes?        •  Labs - Labs ordered, NOT completed. Patient advised to complete prior to next appointment.   Note: If patient appointment is for lab review and patient did not complete labs, check with provider if OK to reschedule patient until labs completed.       •  Imaging - Imaging ordered, NOT completed. Patient advised to complete prior to next appointment. MAMMO scheduled        •  Referrals - No referrals were ordered at last office visit.    3. Is this appointment scheduled as a Hospital Follow-Up? No    4.  Immunizations were updated in [employer] using WebIZ?: Yes       •  Web Iz Recommendations: FLU, TD, VARICELLA (Chicken Pox)  and SHINGRIX (Shingles)    5.  Patient is due for the following Health Maintenance Topics:   Health Maintenance Due   Topic Date Due   • IMM ZOSTER VACCINES (2 of 3) 06/08/2017   • BONE DENSITY  07/11/2019       6. Orders for overdue Health Maintenance topics pended in Pre-Charting? YES    7.  AHA (MDX) form printed for Provider? No, already completed    8.  Patient was informed to arrive 15 min prior to their scheduled appointment and bring in their medication bottles.

## 2019-07-12 ENCOUNTER — OFFICE VISIT (OUTPATIENT)
Dept: MEDICAL GROUP | Facility: PHYSICIAN GROUP | Age: 79
End: 2019-07-12
Payer: MEDICARE

## 2019-07-12 VITALS
HEIGHT: 60 IN | SYSTOLIC BLOOD PRESSURE: 128 MMHG | TEMPERATURE: 97.5 F | WEIGHT: 219 LBS | OXYGEN SATURATION: 95 % | BODY MASS INDEX: 43 KG/M2 | HEART RATE: 101 BPM | DIASTOLIC BLOOD PRESSURE: 70 MMHG

## 2019-07-12 DIAGNOSIS — Z78.0 ASYMPTOMATIC MENOPAUSAL STATE: ICD-10-CM

## 2019-07-12 DIAGNOSIS — Z12.31 ENCOUNTER FOR SCREENING MAMMOGRAM FOR BREAST CANCER: ICD-10-CM

## 2019-07-12 DIAGNOSIS — N18.30 CKD (CHRONIC KIDNEY DISEASE), STAGE III (HCC): ICD-10-CM

## 2019-07-12 DIAGNOSIS — E66.01 MORBID OBESITY WITH BMI OF 40.0-44.9, ADULT (HCC): ICD-10-CM

## 2019-07-12 DIAGNOSIS — E66.01 CLASS 3 SEVERE OBESITY WITH BODY MASS INDEX (BMI) OF 40.0 TO 44.9 IN ADULT, UNSPECIFIED OBESITY TYPE, UNSPECIFIED WHETHER SERIOUS COMORBIDITY PRESENT (HCC): ICD-10-CM

## 2019-07-12 DIAGNOSIS — I10 ESSENTIAL HYPERTENSION: ICD-10-CM

## 2019-07-12 PROCEDURE — 99214 OFFICE O/P EST MOD 30 MIN: CPT | Performed by: INTERNAL MEDICINE

## 2019-07-12 NOTE — ASSESSMENT & PLAN NOTE
This is a chronic health problem that is uncontrolled with current medications and lifestyle measures.  Patient states working on static bicycle every day at her home twice a day, working hard to come down on the weight.

## 2019-07-12 NOTE — ASSESSMENT & PLAN NOTE
This is a chronic health problem that is uncontrolled with current medications and lifestyle measures.  Last EGFR in April 2019 at 51, patient follows Dr. Mae .  Does have future lab orders given by her.

## 2019-07-12 NOTE — PROGRESS NOTES
CC: Follow-up visit, hypertension and also receive mammogram orders and DEXA scan orders.    HISTORY OF PRESENT ILLNESS: Patient is a 78 y.o. female established patient who presents today to discuss on medical conditions as mentioned in HPI below.    Health Maintenance: Due for mammogram, DEXA scan okay to get the orders in the office today.    CKD (chronic kidney disease), stage III  This is a chronic health problem that is uncontrolled with current medications and lifestyle measures.  Last EGFR in April 2019 at 51, patient follows Dr. Mae .  Does have future lab orders given by her.    Essential hypertension  This is a chronic health problem that is well controlled with current medications and lifestyle measures.  Blood pressure in the office today is 128/70, patient is currently on it irbesartan 300 mg daily.    Morbid obesity with BMI of 40.0-44.9, adult ([facility])  This is a chronic health problem that is uncontrolled with current medications and lifestyle measures.  Patient states working on static bicycle every day at her home twice a day, working hard to come down on the weight.      PHQ score 0, BMI > 42 , former tobacco, no fall injuries.    Patient Active Problem List    Diagnosis Date Noted   • Morbid obesity with BMI of 40.0-44.9, adult ([facility]) 05/24/2018   • Glaucoma of both eyes 05/24/2018   • Healthcare maintenance 05/24/2018   • Vitamin D deficiency disease 02/11/2016   • CKD (chronic kidney disease), stage III ([facility]) 09/04/2015   • Essential hypertension 09/04/2015   • Acquired hypothyroidism 09/03/2015   • Abnormal Pap smear of cervix 07/02/2014      Allergies:Patient has no known allergies.    Current Outpatient Prescriptions   Medication Sig Dispense Refill   • levothyroxine (SYNTHROID) 50 MCG Tab TAKE 1 TABLET BY MOUTH EVERY DAY 90 Tab 1   • irbesartan (AVAPRO) 300 MG Tab Take 1 Tab by mouth every day. 90 Tab 1   • cyanocobalamin (VITAMIN B-12) 500 MCG Tab Take 500 mcg by mouth every day.     • Misc  Natural Products (OSTEO BI-FLEX ADV DOUBLE ST PO) Take  by mouth.     • Probiotic Product (PROBIOTIC PO) Take  by mouth.     • Cholecalciferol (VITAMIN D-3 PO) Take 1,000 Units by mouth every day.     • therapeutic multivitamin-minerals (THERAGRAN-M) TABS Take 1 Tab by mouth every day.       No current facility-administered medications for this visit.        Social History   Substance Use Topics   • Smoking status: Former Smoker     Packs/day: 0.25     Years: 12.00     Types: Cigarettes     Quit date: 1/1/1977   • Smokeless tobacco: Never Used      Comment: Started smoking at age 25 (social smoker)   • Alcohol use No     Social History     Social History Narrative   • No narrative on file       Family History   Problem Relation Age of Onset   • Heart Attack Mother    • Alcohol abuse Mother         etoh   • Lung Disease Father         smoker   • Alcohol abuse Father         etoh   • Heart Attack Father    • Hypertension Sister    • Hyperlipidemia Sister    • Lung Disease Sister         smoker   • Colon Cancer Sister    • Lung Disease Maternal Aunt    • Heart Disease Maternal Aunt    • Hypertension Maternal Aunt    • Hyperlipidemia Maternal Aunt    • Stroke Maternal Grandmother    • Other Maternal Grandmother         TB of the brain   • Stroke Maternal Grandfather    • Colon Cancer Paternal Grandmother    • No Known Problems Paternal Grandfather         ROS:     - Constitutional:  Negative for fever, chills, unexpected weight change, and fatigue/generalized weakness.    - HEENT:  Negative for headaches, vision changes, hearing changes, ear pain, ear discharge, rhinorrhea, sinus congestion, sore throat, and neck pain.      - Respiratory: Negative for cough, sputum production, chest congestion, dyspnea, wheezing, and crackles.      - Cardiovascular: Negative for chest pain, palpitations, orthopnea, and bilateral lower extremity edema.     - Gastrointestinal: Negative for heartburn, nausea, vomiting, abdominal pain,  hematochezia, melena, diarrhea, constipation, and greasy/foul-smelling stools.     - Genitourinary: Negative for dysuria, polyuria, hematuria, pyuria, urinary urgency, and urinary incontinence.     - Musculoskeletal: Negative for myalgias, back pain, and joint pain.     - Skin: Negative for rash, itching, cyanotic skin color change.     - Neurological: Negative for dizziness, tingling, tremors, focal sensory deficit, focal weakness and headaches.     - Endo/Heme/Allergies: Does not bruise/bleed easily.     - Psychiatric/Behavioral: Negative for depression, suicidal/homicidal ideation and memory loss.      Last lab work in April 2019 reviewed and discussed with the patient.    Exam:  /70 (BP Location: Left arm, Patient Position: Sitting)   Pulse (!) 101   Temp 36.4 °C (97.5 °F) (Temporal)   Ht 1.524 m (5')   Wt 99.3 kg (219 lb)   SpO2 95%  Body mass index is 42.77 kg/m².  Heart rate normalized to 86 on my exam.    General:  Well nourished, well developed female in NAD  Head is grossly normal.  Neck: Supple without JVD or bruit. Thyroid is not enlarged.  Pulmonary: Clear to ausculation and percussion.  Normal effort. No rales, ronchi, or wheezing.  Cardiovascular: Regular rate and rhythm without murmur. Carotid and radial pulses are intact and equal bilaterally.  Extremities: no clubbing, cyanosis, or edema.    Please note that this dictation was created using voice recognition software. I have made every reasonable attempt to correct obvious errors, but I expect that there are errors of grammar and possibly content that I did not discover before finalizing the note.    Assessment/Plan:  1. Essential hypertension  Well-controlled, continue current irbesartan 300 mg daily.  Patient submitted her home BP log.    2. Morbid obesity with BMI of 40.0-44.9, adult (HCC)  3. Class 3 severe obesity with body mass index (BMI) of 40.0 to 44.9 in adult, unspecified obesity type, unspecified whether serious comorbidity  present (HCC)  - Patient identified as having weight management issue.  Appropriate orders and counseling given.    4. Encounter for screening mammogram for breast cancer  - MA-SCREEN MAMMO W/CAD-BILAT; Future    5. CKD (chronic kidney disease), stage III (HCC)  Stable, does have low EGFR in the last check in April 2019.  Follows nephrology.  Emphasized to keep up all the lab orders given to her and avoid nephrotoxins.    6. Asymptomatic menopausal state  - DS-BONE DENSITY STUDY (DEXA); Future

## 2019-07-12 NOTE — ASSESSMENT & PLAN NOTE
This is a chronic health problem that is well controlled with current medications and lifestyle measures.  Blood pressure in the office today is 128/70, patient is currently on it irbesartan 300 mg daily.

## 2019-07-23 ENCOUNTER — HOSPITAL ENCOUNTER (OUTPATIENT)
Dept: RADIOLOGY | Facility: MEDICAL CENTER | Age: 79
End: 2019-07-23
Attending: INTERNAL MEDICINE
Payer: MEDICARE

## 2019-07-23 DIAGNOSIS — Z12.39 SCREENING BREAST EXAMINATION: ICD-10-CM

## 2019-07-23 DIAGNOSIS — Z78.0 ASYMPTOMATIC MENOPAUSAL STATE: ICD-10-CM

## 2019-07-23 PROCEDURE — 77080 DXA BONE DENSITY AXIAL: CPT

## 2019-07-23 PROCEDURE — 77063 BREAST TOMOSYNTHESIS BI: CPT

## 2019-07-24 NOTE — PROGRESS NOTES
Your DEXA scan for bone density is normal, continue over-the-counter calcium and vitamin D supplements.  Ina Linares M.D.

## 2019-08-27 DIAGNOSIS — I10 ESSENTIAL HYPERTENSION: ICD-10-CM

## 2019-08-28 RX ORDER — LOSARTAN POTASSIUM 100 MG/1
TABLET ORAL
Refills: 0 | OUTPATIENT
Start: 2019-08-28

## 2019-08-28 NOTE — TELEPHONE ENCOUNTER
Was the patient seen in the last year in this department? Yes    Does patient have an active prescription for medications requested? No     Received Request Via: Pharmacy      Pt met protocol?: Yes    OV 7/19    RX WAS DC 6/19

## 2019-09-16 DIAGNOSIS — I10 ESSENTIAL HYPERTENSION: ICD-10-CM

## 2019-09-16 NOTE — TELEPHONE ENCOUNTER
*PER NEW INS PROTOCOL, NEEDS TO BE DONE FOR 100DAYS SUPPLY*  Was the patient seen in the last year in this department? Yes    Does patient have an active prescription for medications requested? YES    Received Request Via: Pharmacy      Pt met protocol?: Yes    LAST OV 07/12/2019    BP Readings from Last 1 Encounters:   07/12/19 128/70     Lab Results   Component Value Date/Time    SODIUM 139 04/29/2019 07:23 AM    POTASSIUM 4.8 04/29/2019 07:23 AM    CHLORIDE 104 04/29/2019 07:23 AM    CO2 26 04/29/2019 07:23 AM    GLUCOSE 99 04/29/2019 07:23 AM    BUN 22 04/29/2019 07:23 AM    CREATININE 1.04 04/29/2019 07:23 AM

## 2019-09-17 RX ORDER — IRBESARTAN 300 MG/1
300 TABLET ORAL DAILY
Qty: 100 TAB | Refills: 0 | Status: SHIPPED | OUTPATIENT
Start: 2019-09-17 | End: 2019-09-19

## 2019-09-19 ENCOUNTER — TELEPHONE (OUTPATIENT)
Dept: MEDICAL GROUP | Facility: PHYSICIAN GROUP | Age: 79
End: 2019-09-19

## 2019-09-19 RX ORDER — TELMISARTAN 80 MG/1
80 TABLET ORAL DAILY
Qty: 100 TAB | Refills: 1 | Status: SHIPPED | OUTPATIENT
Start: 2019-09-19 | End: 2020-02-06

## 2019-09-19 NOTE — TELEPHONE ENCOUNTER
1. Caller Name: Jammie Berry                                             Call Back Number: [phone] (home)         Patient approves a detailed voicemail message: yes    Patient called stating that her pharmacy has received her prescription for irbesartan (AVAPRO) 300 MG Tab, however, the medication is on back order and the patient and pharmacy would like to know if there is something she could get prescribed in the mean time. The pharmacy did not say how long the medication would be on back order for.    Please advise.

## 2019-09-20 NOTE — TELEPHONE ENCOUNTER
I have sent alternative Micardis 80 mg daily , equivalent to her current Irbesartan 300 mg ---  sent to her pharmacy.  .  Ina Linares M.D.

## 2019-09-20 NOTE — TELEPHONE ENCOUNTER
Phone Number Called: [phone] (home)     Call outcome: spoke to patient regarding message below

## 2019-10-01 ENCOUNTER — TELEPHONE (OUTPATIENT)
Dept: MEDICAL GROUP | Facility: PHYSICIAN GROUP | Age: 79
End: 2019-10-01

## 2019-10-01 NOTE — TELEPHONE ENCOUNTER
Future Appointments       Provider Department Center    10/3/2019 10:00 AM Ina Linares M.D. [facility]        ESTABLISHED PATIENT PRE-VISIT PLANNING     Patient was NOT contacted to complete PVP.     Note: Patient will not be contacted if there is no indication to call.     1.  Reviewed notes from the last few office visits within the medical group: Yes    2.  If any orders were placed at last visit or intended to be done for this visit (i.e. 6 mos follow-up), do we have Results/Consult Notes?        •  Labs - Labs ordered, NOT completed. Patient advised to complete prior to next appointment.   Note: If patient appointment is for lab review and patient did not complete labs, check with provider if OK to reschedule patient until labs completed.       •  Imaging - Imaging ordered, completed and results are in chart.       •  Referrals - No referrals were ordered at last office visit.    3. Is this appointment scheduled as a Hospital Follow-Up? No    4.  Immunizations were updated in [employer] using WebIZ?: Yes       •  Web Iz Recommendations: FLU, TD, VARICELLA (Chicken Pox)  and SHINGRIX (Shingles)    5.  Patient is due for the following Health Maintenance Topics:   Health Maintenance Due   Topic Date Due   • IMM HEP B VACCINE (1 of 3 - Risk 3-dose series) 10/15/1959   • IMM ZOSTER VACCINES (2 of 3) 06/08/2017   • IMM INFLUENZA (1) 09/01/2019       6. Orders for overdue Health Maintenance topics pended in Pre-Charting? YES    7.  AHA (MDX) form printed for Provider? No, already completed    8.  Patient was informed to arrive 15 min prior to their scheduled appointment and bring in their medication bottles.

## 2019-10-03 ENCOUNTER — OFFICE VISIT (OUTPATIENT)
Dept: MEDICAL GROUP | Facility: PHYSICIAN GROUP | Age: 79
End: 2019-10-03
Payer: MEDICARE

## 2019-10-03 VITALS
TEMPERATURE: 97.7 F | SYSTOLIC BLOOD PRESSURE: 128 MMHG | HEIGHT: 60 IN | WEIGHT: 223 LBS | OXYGEN SATURATION: 94 % | HEART RATE: 100 BPM | DIASTOLIC BLOOD PRESSURE: 74 MMHG | BODY MASS INDEX: 43.78 KG/M2

## 2019-10-03 DIAGNOSIS — Z23 NEED FOR VACCINATION: ICD-10-CM

## 2019-10-03 DIAGNOSIS — E66.01 MORBID OBESITY WITH BMI OF 40.0-44.9, ADULT (HCC): ICD-10-CM

## 2019-10-03 DIAGNOSIS — N18.30 CKD (CHRONIC KIDNEY DISEASE), STAGE III (HCC): ICD-10-CM

## 2019-10-03 DIAGNOSIS — R00.0 TACHYCARDIA: ICD-10-CM

## 2019-10-03 DIAGNOSIS — I10 ESSENTIAL HYPERTENSION: ICD-10-CM

## 2019-10-03 PROCEDURE — 99214 OFFICE O/P EST MOD 30 MIN: CPT | Mod: 25 | Performed by: INTERNAL MEDICINE

## 2019-10-03 PROCEDURE — 90662 IIV NO PRSV INCREASED AG IM: CPT | Performed by: INTERNAL MEDICINE

## 2019-10-03 PROCEDURE — G0008 ADMIN INFLUENZA VIRUS VAC: HCPCS | Performed by: INTERNAL MEDICINE

## 2019-10-03 PROCEDURE — 93000 ELECTROCARDIOGRAM COMPLETE: CPT | Performed by: INTERNAL MEDICINE

## 2019-10-03 RX ORDER — IRBESARTAN 150 MG/1
150 TABLET ORAL
Refills: 0 | COMMUNITY
Start: 2019-09-17 | End: 2019-10-03

## 2019-10-03 NOTE — PROGRESS NOTES
CC: Follow-up visit, hypertension.    HISTORY OF PRESENT ILLNESS: Patient is a 78 y.o. female established patient who presents today to discuss on medical conditions as mentioned in HPI below.    Health Maintenance: Due for flu vaccine okay to get in the office today, due for hepatitis B vaccine refusing all the risks understood.    Tachycardia  This is a new problem with persistently remaining Tachycardia and no ekg done. Pt says that one episode of dizziness 1 month back and resolved spontaneously. No more episodes after that. Denies any chest pain, palpitations or syncope.    Essential hypertension  This is a chronic health problem that is well controlled with current medications and lifestyle measures. B.P in the office was 128/74. Currently on Telmisartan 80 mg daily due to changes related to insurance issues and recalls of other ARB's.      PHQ score 0, BMI > 43 , no tobacco, no fall injuries.    Patient Active Problem List    Diagnosis Date Noted   • Tachycardia 10/03/2019   • Morbid obesity with BMI of 40.0-44.9, adult ([facility]) 05/24/2018   • Glaucoma of both eyes 05/24/2018   • Healthcare maintenance 05/24/2018   • Vitamin D deficiency disease 02/11/2016   • CKD (chronic kidney disease), stage III ([facility]) 09/04/2015   • Essential hypertension 09/04/2015   • Acquired hypothyroidism 09/03/2015   • Abnormal Pap smear of cervix 07/02/2014      Allergies:Patient has no known allergies.    Current Outpatient Medications   Medication Sig Dispense Refill   • telmisartan (MICARDIS) 80 MG Tab Take 1 Tab by mouth every day. 100 Tab 1   • levothyroxine (SYNTHROID) 50 MCG Tab TAKE 1 TABLET BY MOUTH EVERY DAY 90 Tab 1   • cyanocobalamin (VITAMIN B-12) 500 MCG Tab Take 500 mcg by mouth every day.     • Misc Natural Products (OSTEO BI-FLEX ADV DOUBLE ST PO) Take  by mouth.     • Probiotic Product (PROBIOTIC PO) Take  by mouth.     • Cholecalciferol (VITAMIN D-3 PO) Take 1,000 Units by mouth every day.     • therapeutic  multivitamin-minerals (THERAGRAN-M) TABS Take 1 Tab by mouth every day.       No current facility-administered medications for this visit.        Social History     Tobacco Use   • Smoking status: Former Smoker     Packs/day: 0.25     Years: 12.00     Pack years: 3.00     Types: Cigarettes     Last attempt to quit: 1977     Years since quittin.7   • Smokeless tobacco: Never Used   • Tobacco comment: Started smoking at age 25 (social smoker)   Substance Use Topics   • Alcohol use: No     Alcohol/week: 0.0 oz   • Drug use: No     Social History     Social History Narrative   • Not on file       Family History   Problem Relation Age of Onset   • Heart Attack Mother    • Alcohol abuse Mother         etoh   • Lung Disease Father         smoker   • Alcohol abuse Father         etoh   • Heart Attack Father    • Hypertension Sister    • Hyperlipidemia Sister    • Lung Disease Sister         smoker   • Colon Cancer Sister    • Lung Disease Maternal Aunt    • Heart Disease Maternal Aunt    • Hypertension Maternal Aunt    • Hyperlipidemia Maternal Aunt    • Stroke Maternal Grandmother    • Other Maternal Grandmother         TB of the brain   • Stroke Maternal Grandfather    • Colon Cancer Paternal Grandmother    • No Known Problems Paternal Grandfather         ROS:     - Constitutional:  Negative for fever, chills, unexpected weight change, and fatigue/generalized weakness.    - HEENT:  Negative for headaches, vision changes, hearing changes, ear pain, ear discharge, rhinorrhea, sinus congestion, sore throat, and neck pain.      - Respiratory: Negative for cough, sputum production, chest congestion, dyspnea, wheezing, and crackles.      - Cardiovascular: Negative for chest pain, palpitations, orthopnea, and bilateral lower extremity edema.     - Gastrointestinal: Negative for heartburn, nausea, vomiting, abdominal pain, hematochezia, melena, diarrhea, constipation, and greasy/foul-smelling stools.     - Genitourinary:  Negative for dysuria, polyuria, hematuria, pyuria, urinary urgency, and urinary incontinence.     - Musculoskeletal: Negative for myalgias, back pain, and joint pain.     - Skin: Negative for rash, itching, cyanotic skin color change.     - Neurological: Negative for dizziness, tingling, tremors, focal sensory deficit, focal weakness and headaches.     - Endo/Heme/Allergies: Does not bruise/bleed easily.     - Psychiatric/Behavioral: Negative for depression, suicidal/homicidal ideation and memory loss.      Last lab work in April 2019 reviewed and discussed with the patient.    Exam:    /74 (BP Location: Right arm, Patient Position: Sitting, BP Cuff Size: Large adult)   Pulse 100   Temp 36.5 °C (97.7 °F) (Temporal)   Ht 1.524 m (5')   Wt 101.2 kg (223 lb)   SpO2 94%  Body mass index is 43.55 kg/m².  Heart rate counted again for 1 minute which is at 72.    General:  Well nourished, well developed female in NAD  Head is grossly normal.  Neck: Supple without JVD or bruit. Thyroid is not enlarged.  Pulmonary: Clear to ausculation and percussion.  Normal effort. No rales, ronchi, or wheezing.  Cardiovascular: Regular rate and rhythm without murmur. Carotid and radial pulses are intact and equal bilaterally.  Extremities: no clubbing, cyanosis, or edema.    Please note that this dictation was created using voice recognition software. I have made every reasonable attempt to correct obvious errors, but I expect that there are errors of grammar and possibly content that I did not discover before finalizing the note.    Assessment/Plan:  1. Essential hypertension  2. Tachycardia  Well-controlled blood pressure, continue the current medications as mentioned in HPI above.  Given the ongoing tachycardia persistently with no previous EKG done in patient's chart will check for a baseline EKG to make sure there is no arrhythmias.  Update -normal sinus rhythm, no significant ST-T wave changes.  This looks most likely  situational anxiety as the patient gets anxious occasionally and her heart rate goes up.  Will need calming down therapy emphasized.  - EKG - Clinic Performed    3. CKD (chronic kidney disease), stage III (HCC)  Improving, continue to follow nephrology Dr. Mae.  To avoid nephrotoxins.    4. Morbid obesity with BMI of 40.0-44.9, adult (HCC)  Patient identified as elevated BMI, appropriate counseling given.    5. Need for vaccination  - Influenza Vaccine, High Dose (65+ Only)

## 2019-10-03 NOTE — ASSESSMENT & PLAN NOTE
This is a chronic health problem that is well controlled with current medications and lifestyle measures. B.P in the office was 128/74. Currently on Telmisartan 80 mg daily due to changes related to insurance issues and recalls of other ARB's.

## 2019-10-03 NOTE — ASSESSMENT & PLAN NOTE
This is a new problem with persistently remaining Tachycardia and no ekg done. Pt says that one episode of dizziness 1 month back and resolved spontaneously. No more episodes after that. Denies any chest pain, palpitations or syncope.

## 2019-11-01 ENCOUNTER — HOSPITAL ENCOUNTER (OUTPATIENT)
Dept: LAB | Facility: MEDICAL CENTER | Age: 79
End: 2019-11-01
Attending: INTERNAL MEDICINE
Payer: MEDICARE

## 2019-11-01 DIAGNOSIS — I10 ESSENTIAL HYPERTENSION: ICD-10-CM

## 2019-11-01 DIAGNOSIS — D64.9 ANEMIA, UNSPECIFIED TYPE: ICD-10-CM

## 2019-11-01 DIAGNOSIS — N18.30 CKD (CHRONIC KIDNEY DISEASE), STAGE III (HCC): ICD-10-CM

## 2019-11-01 DIAGNOSIS — E03.9 ACQUIRED HYPOTHYROIDISM: ICD-10-CM

## 2019-11-01 LAB
25(OH)D3 SERPL-MCNC: 46 NG/ML (ref 30–100)
ANION GAP SERPL CALC-SCNC: 9 MMOL/L (ref 0–11.9)
APPEARANCE UR: CLEAR
BACTERIA #/AREA URNS HPF: NEGATIVE /HPF
BILIRUB UR QL STRIP.AUTO: NEGATIVE
BUN SERPL-MCNC: 20 MG/DL (ref 8–22)
CALCIUM SERPL-MCNC: 9.1 MG/DL (ref 8.5–10.5)
CHLORIDE SERPL-SCNC: 107 MMOL/L (ref 96–112)
CO2 SERPL-SCNC: 25 MMOL/L (ref 20–33)
COLOR UR: YELLOW
CREAT SERPL-MCNC: 1.14 MG/DL (ref 0.5–1.4)
CREAT UR-MCNC: 149.2 MG/DL
EPI CELLS #/AREA URNS HPF: NEGATIVE /HPF
ERYTHROCYTE [DISTWIDTH] IN BLOOD BY AUTOMATED COUNT: 46.9 FL (ref 35.9–50)
GLUCOSE SERPL-MCNC: 101 MG/DL (ref 65–99)
GLUCOSE UR STRIP.AUTO-MCNC: NEGATIVE MG/DL
HCT VFR BLD AUTO: 42.1 % (ref 37–47)
HGB BLD-MCNC: 13.3 G/DL (ref 12–16)
HYALINE CASTS #/AREA URNS LPF: NORMAL /LPF
KETONES UR STRIP.AUTO-MCNC: NEGATIVE MG/DL
LEUKOCYTE ESTERASE UR QL STRIP.AUTO: ABNORMAL
MCH RBC QN AUTO: 30.2 PG (ref 27–33)
MCHC RBC AUTO-ENTMCNC: 31.6 G/DL (ref 33.6–35)
MCV RBC AUTO: 95.5 FL (ref 81.4–97.8)
MICRO URNS: ABNORMAL
MICROALBUMIN UR-MCNC: 0.8 MG/DL
MICROALBUMIN/CREAT UR: 5 MG/G (ref 0–30)
NITRITE UR QL STRIP.AUTO: NEGATIVE
PH UR STRIP.AUTO: 6.5 [PH] (ref 5–8)
PLATELET # BLD AUTO: 318 K/UL (ref 164–446)
PMV BLD AUTO: 9.6 FL (ref 9–12.9)
POTASSIUM SERPL-SCNC: 4.6 MMOL/L (ref 3.6–5.5)
PROT UR QL STRIP: NEGATIVE MG/DL
RBC # BLD AUTO: 4.41 M/UL (ref 4.2–5.4)
RBC # URNS HPF: NORMAL /HPF
RBC UR QL AUTO: NEGATIVE
SODIUM SERPL-SCNC: 141 MMOL/L (ref 135–145)
SP GR UR STRIP.AUTO: 1.02
TSH SERPL DL<=0.005 MIU/L-ACNC: 3.72 UIU/ML (ref 0.38–5.33)
UROBILINOGEN UR STRIP.AUTO-MCNC: 0.2 MG/DL
WBC # BLD AUTO: 7.2 K/UL (ref 4.8–10.8)
WBC #/AREA URNS HPF: NORMAL /HPF

## 2019-11-01 PROCEDURE — 36415 COLL VENOUS BLD VENIPUNCTURE: CPT

## 2019-11-01 PROCEDURE — 82306 VITAMIN D 25 HYDROXY: CPT

## 2019-11-01 PROCEDURE — 81001 URINALYSIS AUTO W/SCOPE: CPT

## 2019-11-01 PROCEDURE — 85027 COMPLETE CBC AUTOMATED: CPT

## 2019-11-01 PROCEDURE — 80048 BASIC METABOLIC PNL TOTAL CA: CPT

## 2019-11-01 PROCEDURE — 82570 ASSAY OF URINE CREATININE: CPT

## 2019-11-01 PROCEDURE — 82043 UR ALBUMIN QUANTITATIVE: CPT

## 2019-11-01 PROCEDURE — 84443 ASSAY THYROID STIM HORMONE: CPT

## 2020-01-02 RX ORDER — LEVOTHYROXINE SODIUM 0.05 MG/1
TABLET ORAL
Qty: 90 TAB | Refills: 2 | Status: SHIPPED | OUTPATIENT
Start: 2020-01-02 | End: 2020-02-06 | Stop reason: SDUPTHER

## 2020-01-02 NOTE — TELEPHONE ENCOUNTER
Was the patient seen in the last year in this department? Yes    Does patient have an active prescription for medications requested? No     Received Request Via: Pharmacy      Pt met protocol?: Yes   Pt last ov 10/19   TSH   Date Value Ref Range Status   11/01/2019 3.720 0.380 - 5.330 uIU/mL Final     Comment:     Please note new reference ranges effective 12/14/2017 10:00 AM  Pregnant Females, 1st Trimester  0.050-3.700  Pregnant Females, 2nd Trimester  0.310-4.350  Pregnant Females, 3rd Trimester  0.410-5.180

## 2020-02-05 ENCOUNTER — TELEPHONE (OUTPATIENT)
Dept: MEDICAL GROUP | Facility: PHYSICIAN GROUP | Age: 80
End: 2020-02-05

## 2020-02-06 ENCOUNTER — OFFICE VISIT (OUTPATIENT)
Dept: MEDICAL GROUP | Facility: PHYSICIAN GROUP | Age: 80
End: 2020-02-06
Payer: MEDICARE

## 2020-02-06 VITALS
WEIGHT: 226 LBS | HEART RATE: 99 BPM | SYSTOLIC BLOOD PRESSURE: 116 MMHG | OXYGEN SATURATION: 92 % | BODY MASS INDEX: 44.37 KG/M2 | HEIGHT: 60 IN | TEMPERATURE: 97.5 F | DIASTOLIC BLOOD PRESSURE: 66 MMHG

## 2020-02-06 DIAGNOSIS — N18.30 CKD (CHRONIC KIDNEY DISEASE), STAGE III: ICD-10-CM

## 2020-02-06 DIAGNOSIS — E66.01 MORBID OBESITY WITH BMI OF 40.0-44.9, ADULT (HCC): ICD-10-CM

## 2020-02-06 DIAGNOSIS — R00.0 TACHYCARDIA: ICD-10-CM

## 2020-02-06 DIAGNOSIS — E03.9 ACQUIRED HYPOTHYROIDISM: ICD-10-CM

## 2020-02-06 DIAGNOSIS — I10 ESSENTIAL HYPERTENSION: ICD-10-CM

## 2020-02-06 DIAGNOSIS — E66.01 CLASS 3 SEVERE OBESITY WITH BODY MASS INDEX (BMI) OF 40.0 TO 44.9 IN ADULT, UNSPECIFIED OBESITY TYPE, UNSPECIFIED WHETHER SERIOUS COMORBIDITY PRESENT (HCC): ICD-10-CM

## 2020-02-06 PROCEDURE — 8041 PR SCP AHA: Performed by: INTERNAL MEDICINE

## 2020-02-06 PROCEDURE — 99214 OFFICE O/P EST MOD 30 MIN: CPT | Performed by: INTERNAL MEDICINE

## 2020-02-06 RX ORDER — LEVOTHYROXINE SODIUM 0.05 MG/1
50 TABLET ORAL
Qty: 90 TAB | Refills: 1 | Status: SHIPPED | OUTPATIENT
Start: 2020-02-06 | End: 2020-09-21

## 2020-02-06 RX ORDER — ATENOLOL 25 MG/1
25 TABLET ORAL DAILY
Qty: 100 TAB | Refills: 1 | Status: SHIPPED | OUTPATIENT
Start: 2020-02-06 | End: 2020-07-28

## 2020-02-06 RX ORDER — TELMISARTAN 40 MG/1
40 TABLET ORAL DAILY
Qty: 100 TAB | Refills: 1 | Status: SHIPPED | OUTPATIENT
Start: 2020-02-06 | End: 2020-08-19

## 2020-02-06 ASSESSMENT — PATIENT HEALTH QUESTIONNAIRE - PHQ9: CLINICAL INTERPRETATION OF PHQ2 SCORE: 0

## 2020-02-06 NOTE — TELEPHONE ENCOUNTER
ESTABLISHED PATIENT PRE-VISIT PLANNING     Patient was NOT contacted to complete PVP.    1.  Reviewed notes from the last few office visits within the medical group: Yes    2.  If any orders were placed at last visit or intended to be done for this visit (i.e. 6 mos follow-up), do we have Results/Consult Notes?        •  Labs - Labs ordered, completed on 11/01/2019 and results are in chart.       •  Imaging - Imaging was not ordered at last office visit.       •  Referrals - No referrals were ordered at last office visit.    3. Is this appointment scheduled as a Hospital Follow-Up? No    4.  Immunizations were updated in [employer] using WebIZ?: Yes       •  Web Iz Recommendations: TD, VARICELLA (Chicken Pox)  and SHINGRIX (Shingles)    5.  Patient is due for the following Health Maintenance Topics:   Health Maintenance Due   Topic Date Due   • IMM ZOSTER VACCINES (2 of 3) 06/08/2017     6. Orders for overdue Health Maintenance topics pended in Pre-Charting? NO    7.  AHA (MDX) form printed for Provider? YES    8.  Patient was NOT informed to arrive 15 min prior to their scheduled appointment and bring in their medication bottles.

## 2020-02-06 NOTE — PROGRESS NOTES
Subjective:     Jammie Berry is a 79 y.o. female here today for follow-up visit, hypertension and Annual Health Assessment.    Tachycardia  This is a chronic health problem that is uncontrolled with current medications and lifestyle measures.  Patient does states that he is she is having ongoing tachycardia on and off for which we have already done the previous EKG for her.    CKD (chronic kidney disease), stage III  This is a chronic health problem that is uncontrolled with current medications and lifestyle measures.  Last kidney function on 11/2019 showing EGFR in 40s, patient did visit the nephrology Dr. Mae in May 2019, she has been taking on and off over-the-counter Aleve.    Essential hypertension  This is a chronic health problem that is well controlled with current medications and lifestyle measures.  Blood pressure in the office today is 116/66, patient currently on telmisartan 40 mg daily.     PHQ score 0, BMI > 44 , no tobacco, no fall injuries    Health Maintenance Summary                IMM ZOSTER VACCINES Overdue 6/8/2017      Done 4/13/2017 Imm Admin: Zoster Vaccine Live (ZVL) (Zostavax)    Annual Wellness Visit Next Due 6/4/2020      Done 6/4/2019 SUBSEQUENT ANNUAL WELLNESS VISIT-INCLUDES PPPS ()     Patient has more history with this topic...    MAMMOGRAM Next Due 7/23/2020      Done 7/23/2019 MA-SCREENING MAMMO BILAT W/TOMOSYNTHESIS W/CAD     Patient has more history with this topic...    BONE DENSITY Next Due 7/23/2024      Done 7/23/2019 DS-BONE DENSITY STUDY (DEXA)     Patient has more history with this topic...    COLONOSCOPY Next Due 10/12/2025      Done 10/12/2015 REFERRAL TO GI FOR COLONOSCOPY    IMM DTaP/Tdap/Td Vaccine Next Due 8/29/2026      Done 8/29/2016 Imm Admin: Tdap Vaccine           Annual Health Assessment Questions:     1.  Are you currently engaging in any exercise or physical activity? Yes    2.  How would you describe your mood or emotional well-being today?  good    3.  Have you had any falls in the last year? No    4.  Have you noticed any problems with your balance or had difficulty walking? No    5.  In the last six months have you experienced any leakage of urine? No    6. DPA/Advanced Directive: Patient has Living Will, but it is not on file. Instructed to bring in a copy to scan into their chart.    Current medicines (including changes today)  Current Outpatient Medications   Medication Sig Dispense Refill   • atenolol (TENORMIN) 25 MG Tab Take 1 Tab by mouth every day. 100 Tab 1   • levothyroxine (SYNTHROID) 50 MCG Tab Take 1 Tab by mouth every day. 90 Tab 1   • telmisartan (MICARDIS) 40 MG Tab Take 1 Tab by mouth every day. 100 Tab 1   • cyanocobalamin (VITAMIN B-12) 500 MCG Tab Take 500 mcg by mouth every day.     • Probiotic Product (PROBIOTIC PO) Take  by mouth.     • Cholecalciferol (VITAMIN D-3 PO) Take 1,000 Units by mouth every day.     • therapeutic multivitamin-minerals (THERAGRAN-M) TABS Take 1 Tab by mouth every day.       No current facility-administered medications for this visit.        She  has a past medical history of Abnormal Pap smear of cervix, CKD (chronic kidney disease), stage III (HCC) (9/4/2015), Essential hypertension (9/4/2015), Other specified hypothyroidism (9/3/2015), and Tuberculosis. She also has no past medical history of Headache(784.0), Ulcer, or Urinary tract infection, site not specified.    Patient has no known allergies.    She  reports that she quit smoking about 43 years ago. Her smoking use included cigarettes. She has a 3.00 pack-year smoking history. She has never used smokeless tobacco. She reports that she does not drink alcohol or use drugs.  Counseling given: Not Answered  Comment: Started smoking at age 25 (social smoker)    Constitutional: Denies fevers or chills  Eyes: Denies changes in vision  Ears/Nose/Throat/Mouth: Denies nasal congestion or sore throat   Cardiovascular: Denies chest pain or palpitations    Respiratory: Denies shortness of breath , Denies cough  Gastrointestinal/Hepatic: Denies abd pain, nausea, vomiting   Genitourinary: Denies dysuria or frequency  Musculoskeletal/Rheum: Denies joint pain and swelling   Neurological: Denies headache  Psychiatric: Denies mood disorder   Endocrine: Denies hx of diabetes or thyroid dysfunction  Heme/Oncology/Lymph Nodes: Denies weight changes or enlarged LNs. Allergic/Immunologic: Denies hx of allergies      Objective:     Physical Exam:  /66 (BP Location: Right arm, Patient Position: Sitting, BP Cuff Size: Large adult)   Pulse 99   Temp 36.4 °C (97.5 °F) (Temporal)   Ht 1.524 m (5')   Wt 102.5 kg (226 lb)   SpO2 92%  Body mass index is 44.14 kg/m².   Constitutional: Alert, no distress.  Skin: Warm, dry, good turgor, no rashes in visible areas.  Eye: Equal, round and reactive, conjunctiva clear, lids normal.  ENMT: Lips without lesions, good dentition, oropharynx clear.  Neck: Trachea midline, no masses, no thyromegaly. No cervical or supraclavicular lymphadenopathy.  Respiratory: Unlabored respiratory effort, lungs clear to auscultation, no wheezes, no rhonchi.  Cardiovascular: Normal S1, S2, no murmur, no edema.  Abdomen: Soft, non-tender, no masses, no hepatosplenomegaly.  Psych: Alert and oriented x3, normal affect and mood.    Assessment and Plan:     1. CKD (chronic kidney disease), stage III (HCC)  Uncontrolled, emphasized again and again not to use NSAIDs which patient has been persistently using over-the-counter Aleve.  Patient understood and agreed with the plan, to continue to follow nephrology and keep the follow-up visits with primary care for checking on this.  Which he understood and agreed.    2. Essential hypertension  3. Tachycardia  Well-controlled, recent EKG done in October showing no arrhythmias though heart heart rate was ranging in 100s at the time.  Discussed at length on adjusting her blood pressure medications with starting a  beta-blocker to help her tachycardia which he understood and agreed.  Please see HPI above for further details.  Given instructions to get me the BP log for next 10 days on my chart on the new atenolol started and cut down her telmisartan to 40 mg.    4. Morbid obesity with BMI of 40.0-44.9, adult (HCC)  5. Class 3 severe obesity with body mass index (BMI) of 40.0 to 44.9 in adult, unspecified obesity type, unspecified whether serious comorbidity present (HCC)  Patient identified as elevated BMI, appropriate counseling given.    6. Acquired hypothyroidism  Well-controlled, continue current levothyroxine as refilled.  Last TSH check done recently within normal limits    Discussion today about general wellness and lifestyle habits:    · Engage in regular physical activity and social activities.  · Prevent falls and reduce trip hazards; using ambulatory aides, hearing and vision testing if appropriate.  · Steps to improve urinary incontinence.  · Advanced care planning.    Follow-Up: Return in about 3 months (around 5/6/2020), or if symptoms worsen or fail to improve, for To establish care with new PCP as I am leaving..         PLEASE NOTE: This dictation was created using voice recognition software. I have made every reasonable attempt to correct obvious errors, but I expect that there are errors of grammar and possibly content that I did not discover before finalizing the note.

## 2020-02-06 NOTE — ASSESSMENT & PLAN NOTE
This is a chronic health problem that is well controlled with current medications and lifestyle measures.  Blood pressure in the office today is 116/66, patient currently on telmisartan 40 mg daily.

## 2020-02-06 NOTE — ASSESSMENT & PLAN NOTE
This is a chronic health problem that is uncontrolled with current medications and lifestyle measures.  Last kidney function on 11/2019 showing EGFR in 40s, patient did visit the nephrology Dr. Mae in May 2019, she has been taking on and off over-the-counter Aleve.

## 2020-02-06 NOTE — ASSESSMENT & PLAN NOTE
This is a chronic health problem that is uncontrolled with current medications and lifestyle measures.  Patient does states that he is she is having ongoing tachycardia on and off for which we have already done the previous EKG for her.

## 2020-02-26 ENCOUNTER — HOSPITAL ENCOUNTER (OUTPATIENT)
Dept: LAB | Facility: MEDICAL CENTER | Age: 80
End: 2020-02-26
Attending: INTERNAL MEDICINE
Payer: MEDICARE

## 2020-02-26 ENCOUNTER — APPOINTMENT (OUTPATIENT)
Dept: NEPHROLOGY | Facility: MEDICAL CENTER | Age: 80
End: 2020-02-26
Payer: MEDICARE

## 2020-02-26 DIAGNOSIS — N18.30 CKD (CHRONIC KIDNEY DISEASE), STAGE III: ICD-10-CM

## 2020-02-26 DIAGNOSIS — I10 ESSENTIAL HYPERTENSION: ICD-10-CM

## 2020-02-26 LAB
ANION GAP SERPL CALC-SCNC: 7 MMOL/L (ref 0–11.9)
BUN SERPL-MCNC: 24 MG/DL (ref 8–22)
CALCIUM SERPL-MCNC: 9.4 MG/DL (ref 8.5–10.5)
CHLORIDE SERPL-SCNC: 101 MMOL/L (ref 96–112)
CO2 SERPL-SCNC: 26 MMOL/L (ref 20–33)
CREAT SERPL-MCNC: 1.2 MG/DL (ref 0.5–1.4)
GLUCOSE SERPL-MCNC: 103 MG/DL (ref 65–99)
POTASSIUM SERPL-SCNC: 4.5 MMOL/L (ref 3.6–5.5)
SODIUM SERPL-SCNC: 134 MMOL/L (ref 135–145)

## 2020-02-26 PROCEDURE — 80048 BASIC METABOLIC PNL TOTAL CA: CPT

## 2020-02-26 PROCEDURE — 36415 COLL VENOUS BLD VENIPUNCTURE: CPT

## 2020-02-27 ENCOUNTER — OFFICE VISIT (OUTPATIENT)
Dept: NEPHROLOGY | Facility: MEDICAL CENTER | Age: 80
End: 2020-02-27
Payer: MEDICARE

## 2020-02-27 VITALS
DIASTOLIC BLOOD PRESSURE: 74 MMHG | HEART RATE: 75 BPM | BODY MASS INDEX: 44.57 KG/M2 | OXYGEN SATURATION: 97 % | HEIGHT: 60 IN | TEMPERATURE: 98.3 F | RESPIRATION RATE: 18 BRPM | WEIGHT: 227 LBS | SYSTOLIC BLOOD PRESSURE: 114 MMHG

## 2020-02-27 DIAGNOSIS — E55.9 VITAMIN D DEFICIENCY DISEASE: ICD-10-CM

## 2020-02-27 DIAGNOSIS — R80.9 MICROALBUMINURIA: ICD-10-CM

## 2020-02-27 DIAGNOSIS — N18.30 CKD (CHRONIC KIDNEY DISEASE), STAGE III: ICD-10-CM

## 2020-02-27 DIAGNOSIS — D64.9 ANEMIA, UNSPECIFIED TYPE: ICD-10-CM

## 2020-02-27 DIAGNOSIS — I10 ESSENTIAL HYPERTENSION: ICD-10-CM

## 2020-02-27 PROCEDURE — 99213 OFFICE O/P EST LOW 20 MIN: CPT | Performed by: INTERNAL MEDICINE

## 2020-02-27 ASSESSMENT — ENCOUNTER SYMPTOMS
HEMOPTYSIS: 0
CHILLS: 0
WEIGHT LOSS: 0
ORTHOPNEA: 0
WHEEZING: 0
EYES NEGATIVE: 1
SINUS PAIN: 0
PALPITATIONS: 0
VOMITING: 0
FEVER: 0
ABDOMINAL PAIN: 0
SHORTNESS OF BREATH: 0
COUGH: 0
NAUSEA: 0

## 2020-02-27 NOTE — PROGRESS NOTES
Subjective:      Jammie Berry is a 79 y.o. female who presents with Follow-Up and Chronic Kidney Disease            Chronic Kidney Disease   Pertinent negatives include no abdominal pain, chest pain, chills, congestion, coughing, fever, nausea or vomiting.     Jammie is coming today for f/u of CKD III, HTN  Doing well , no complaints  No difficulties to urinate  No dysuria/hematuria/flank pain  No edema  CKD III -creat level stable at baseline  HTN:BP well controlled, compliant to low Na diet, medications    Review of Systems   Constitutional: Negative for chills, fever, malaise/fatigue and weight loss.   HENT: Negative for congestion, hearing loss and sinus pain.    Eyes: Negative.    Respiratory: Negative for cough, hemoptysis, shortness of breath and wheezing.    Cardiovascular: Negative for chest pain, palpitations, orthopnea and leg swelling.   Gastrointestinal: Negative for abdominal pain, nausea and vomiting.   Genitourinary: Negative for dysuria, frequency, hematuria and urgency.   Skin: Negative.    All other systems reviewed and are negative.    Past Medical History:   Diagnosis Date   • Abnormal Pap smear of cervix     Dr. 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       Family History   Problem Relation Age of Onset   • Heart Attack Mother    • Alcohol abuse Mother         etoh   • Lung Disease Father         smoker   • Alcohol abuse Father         etoh   • Heart Attack Father    • Hypertension Sister    • Hyperlipidemia Sister    • Lung Disease Sister         smoker   • Colon Cancer Sister    • Lung Disease Maternal Aunt    • Heart Disease Maternal Aunt    • Hypertension Maternal Aunt    • Hyperlipidemia Maternal Aunt    • Stroke Maternal Grandmother    • Other Maternal Grandmother         TB of the brain   • Stroke Maternal Grandfather    •  Colon Cancer Paternal Grandmother    • No Known Problems Paternal Grandfather        Social History     Socioeconomic History   • Marital status:      Spouse name: Not on file   • Number of children: 0   • Years of education: Not on file   • Highest education level: Not on file   Occupational History   • Occupation: retired CNA   Social Needs   • Financial resource strain: Not on file   • Food insecurity     Worry: Not on file     Inability: Not on file   • Transportation needs     Medical: Not on file     Non-medical: Not on file   Tobacco Use   • Smoking status: Former Smoker     Packs/day: 0.25     Years: 12.00     Pack years: 3.00     Types: Cigarettes     Last attempt to quit: 1977     Years since quittin.1   • Smokeless tobacco: Never Used   • Tobacco comment: Started smoking at age 25 (social smoker)   Substance and Sexual Activity   • Alcohol use: No     Alcohol/week: 0.0 oz   • Drug use: No   • Sexual activity: Never     Partners: Male     Comment: vaginal atrophy-dysparunia   Lifestyle   • Physical activity     Days per week: Not on file     Minutes per session: Not on file   • Stress: Not on file   Relationships   • Social connections     Talks on phone: Not on file     Gets together: Not on file     Attends Scientologist service: Not on file     Active member of club or organization: Not on file     Attends meetings of clubs or organizations: Not on file     Relationship status: Not on file   • Intimate partner violence     Fear of current or ex partner: Not on file     Emotionally abused: Not on file     Physically abused: Not on file     Forced sexual activity: Not on file   Other Topics Concern   • Not on file   Social History Narrative   • Not on file          Objective:     /74 (BP Location: Right arm, Patient Position: Sitting, BP Cuff Size: Adult) Comment: R Forearm  Pulse 75   Temp 36.8 °C (98.3 °F) (Temporal)   Resp 18   Ht 1.524 m (5')   Wt 103 kg (227 lb)   SpO2 97%    BMI 44.33 kg/m²      Physical Exam  Constitutional:       General: She is not in acute distress.     Appearance: Normal appearance. She is well-developed. She is not diaphoretic.   HENT:      Head: Normocephalic and atraumatic.      Nose: Nose normal. No congestion.      Mouth/Throat:      Mouth: Mucous membranes are moist.      Pharynx: Oropharynx is clear.   Eyes:      General: No scleral icterus.     Extraocular Movements: Extraocular movements intact.      Conjunctiva/sclera: Conjunctivae normal.      Pupils: Pupils are equal, round, and reactive to light.   Neck:      Musculoskeletal: Neck supple.   Cardiovascular:      Rate and Rhythm: Normal rate and regular rhythm.      Pulses: Normal pulses.      Heart sounds: Normal heart sounds. No friction rub. No gallop.    Pulmonary:      Effort: Pulmonary effort is normal. No respiratory distress.      Breath sounds: Normal breath sounds. No wheezing or rales.   Abdominal:      General: Bowel sounds are normal. There is no distension.      Palpations: Abdomen is soft. There is no mass.      Tenderness: There is no abdominal tenderness. There is no right CVA tenderness, left CVA tenderness or guarding.   Musculoskeletal:      Right lower leg: No edema.      Left lower leg: No edema.   Skin:     General: Skin is warm.      Findings: No erythema or rash.   Neurological:      General: No focal deficit present.      Mental Status: She is alert and oriented to person, place, and time.      Cranial Nerves: No cranial nerve deficit.      Coordination: Coordination normal.   Psychiatric:         Mood and Affect: Mood normal.         Behavior: Behavior normal.         Thought Content: Thought content normal.         Judgment: Judgment normal.               Laboratory results reviewed; d/w pt  Lab Results   Component Value Date/Time    CREATININE 1.20 02/26/2020 02:17 PM    POTASSIUM 4.5 02/26/2020 02:17 PM       Assessment/Plan:     1. CKD (chronic kidney disease), stage III  (HCC)    Creat level stable at baseline -to monitor      2. Essential hypertension      BP well controlled      Continue current treatment    3. Vitamin D deficiency disease      Vit D level well controlled WNL    4. Anemia, unspecified type      Hb stable -WNL    Recs: continue current treatment, low salt diet, keep well hydrated, avoid NSAID's             F/u in 6 months

## 2020-05-04 ENCOUNTER — TELEPHONE (OUTPATIENT)
Dept: MEDICAL GROUP | Facility: PHYSICIAN GROUP | Age: 80
End: 2020-05-04

## 2020-05-04 NOTE — TELEPHONE ENCOUNTER
ESTABLISHED PATIENT PRE-VISIT PLANNING     Patient was NOT contacted to complete PVP.    1.  Reviewed notes from the last few office visits within the medical group: Yes    2.  If any orders were placed at last visit or intended to be done for this visit (i.e. 6 mos follow-up), do we have Results/Consult Notes?        •  Labs - Labs were not ordered at last office visit.       •  Imaging - Imaging was not ordered at last office visit.       •  Referrals - No referrals were ordered at last office visit.    3. Is this appointment scheduled as a Hospital Follow-Up? No    4.  Immunizations were updated in [facility] using WebIZ?: Yes       •  Web Iz Recommendations: TD, VARICELLA (Chicken Pox)  and SHINGRIX (Shingles)    5.  Patient is due for the following Health Maintenance Topics:   Health Maintenance Due   Topic Date Due   • IMM ZOSTER VACCINES (2 of 3) 06/08/2017     6. Orders for overdue Health Maintenance topics pended in Pre-Charting? NO    7.  AHA (MDX) form printed for Provider? No, already completed    8.  Patient was NOT informed to arrive 15 min prior to their scheduled appointment and bring in their medication bottles.

## 2020-05-14 ENCOUNTER — OFFICE VISIT (OUTPATIENT)
Dept: MEDICAL GROUP | Facility: PHYSICIAN GROUP | Age: 80
End: 2020-05-14
Payer: MEDICARE

## 2020-05-14 VITALS
WEIGHT: 230 LBS | TEMPERATURE: 98.6 F | HEIGHT: 60 IN | SYSTOLIC BLOOD PRESSURE: 128 MMHG | BODY MASS INDEX: 45.16 KG/M2 | OXYGEN SATURATION: 94 % | DIASTOLIC BLOOD PRESSURE: 72 MMHG | HEART RATE: 78 BPM

## 2020-05-14 DIAGNOSIS — N18.30 CKD (CHRONIC KIDNEY DISEASE), STAGE III: ICD-10-CM

## 2020-05-14 DIAGNOSIS — E03.9 ACQUIRED HYPOTHYROIDISM: ICD-10-CM

## 2020-05-14 DIAGNOSIS — Z23 NEED FOR VACCINATION: ICD-10-CM

## 2020-05-14 DIAGNOSIS — M54.50 CHRONIC BILATERAL LOW BACK PAIN WITHOUT SCIATICA: ICD-10-CM

## 2020-05-14 DIAGNOSIS — G89.29 CHRONIC BILATERAL LOW BACK PAIN WITHOUT SCIATICA: ICD-10-CM

## 2020-05-14 DIAGNOSIS — I10 ESSENTIAL HYPERTENSION: ICD-10-CM

## 2020-05-14 PROBLEM — R00.0 TACHYCARDIA: Status: RESOLVED | Noted: 2019-10-03 | Resolved: 2020-05-14

## 2020-05-14 PROCEDURE — 90471 IMMUNIZATION ADMIN: CPT | Performed by: NURSE PRACTITIONER

## 2020-05-14 PROCEDURE — 99214 OFFICE O/P EST MOD 30 MIN: CPT | Mod: 25 | Performed by: NURSE PRACTITIONER

## 2020-05-14 PROCEDURE — 90750 HZV VACC RECOMBINANT IM: CPT | Performed by: NURSE PRACTITIONER

## 2020-07-24 ENCOUNTER — TELEPHONE (OUTPATIENT)
Dept: MEDICAL GROUP | Facility: PHYSICIAN GROUP | Age: 80
End: 2020-07-24

## 2020-07-24 ENCOUNTER — HOSPITAL ENCOUNTER (OUTPATIENT)
Dept: RADIOLOGY | Facility: MEDICAL CENTER | Age: 80
End: 2020-07-24
Attending: NURSE PRACTITIONER
Payer: MEDICARE

## 2020-07-24 DIAGNOSIS — Z12.31 VISIT FOR SCREENING MAMMOGRAM: ICD-10-CM

## 2020-07-24 PROCEDURE — 77067 SCR MAMMO BI INCL CAD: CPT

## 2020-07-25 NOTE — TELEPHONE ENCOUNTER
----- Message from NORA Iyer sent at 7/24/2020  3:44 PM PDT -----  Please call patient and let them know her mammogram came back normal!  Seco!  She can continue to get yearly screenings.   Thanks, Bree VALIENTE

## 2020-07-28 ENCOUNTER — HOSPITAL ENCOUNTER (OUTPATIENT)
Dept: LAB | Facility: MEDICAL CENTER | Age: 80
End: 2020-07-28
Attending: INTERNAL MEDICINE
Payer: MEDICARE

## 2020-07-28 DIAGNOSIS — N18.30 CKD (CHRONIC KIDNEY DISEASE), STAGE III: ICD-10-CM

## 2020-07-28 DIAGNOSIS — I10 ESSENTIAL HYPERTENSION: ICD-10-CM

## 2020-07-28 LAB
25(OH)D3 SERPL-MCNC: 65 NG/ML (ref 30–100)
ANION GAP SERPL CALC-SCNC: 13 MMOL/L (ref 7–16)
BUN SERPL-MCNC: 18 MG/DL (ref 8–22)
CALCIUM SERPL-MCNC: 9.1 MG/DL (ref 8.5–10.5)
CHLORIDE SERPL-SCNC: 102 MMOL/L (ref 96–112)
CO2 SERPL-SCNC: 22 MMOL/L (ref 20–33)
CREAT SERPL-MCNC: 1.14 MG/DL (ref 0.5–1.4)
CREAT UR-MCNC: 121.67 MG/DL
FASTING STATUS PATIENT QL REPORTED: NORMAL
GLUCOSE SERPL-MCNC: 98 MG/DL (ref 65–99)
MICROALBUMIN UR-MCNC: <1.2 MG/DL
MICROALBUMIN/CREAT UR: NORMAL MG/G (ref 0–30)
POTASSIUM SERPL-SCNC: 4.6 MMOL/L (ref 3.6–5.5)
SODIUM SERPL-SCNC: 137 MMOL/L (ref 135–145)

## 2020-07-28 PROCEDURE — 82570 ASSAY OF URINE CREATININE: CPT

## 2020-07-28 PROCEDURE — 82306 VITAMIN D 25 HYDROXY: CPT

## 2020-07-28 PROCEDURE — 36415 COLL VENOUS BLD VENIPUNCTURE: CPT

## 2020-07-28 PROCEDURE — 82043 UR ALBUMIN QUANTITATIVE: CPT

## 2020-07-28 PROCEDURE — 80048 BASIC METABOLIC PNL TOTAL CA: CPT

## 2020-07-28 RX ORDER — ATENOLOL 25 MG/1
TABLET ORAL
Qty: 90 TAB | Refills: 0 | Status: SHIPPED | OUTPATIENT
Start: 2020-07-28 | End: 2020-10-20

## 2020-08-06 ENCOUNTER — APPOINTMENT (OUTPATIENT)
Dept: NEPHROLOGY | Facility: MEDICAL CENTER | Age: 80
End: 2020-08-06
Payer: MEDICARE

## 2020-08-13 ENCOUNTER — OFFICE VISIT (OUTPATIENT)
Dept: NEPHROLOGY | Facility: MEDICAL CENTER | Age: 80
End: 2020-08-13
Payer: MEDICARE

## 2020-08-13 VITALS
BODY MASS INDEX: 44.76 KG/M2 | SYSTOLIC BLOOD PRESSURE: 122 MMHG | DIASTOLIC BLOOD PRESSURE: 68 MMHG | WEIGHT: 228 LBS | RESPIRATION RATE: 16 BRPM | HEIGHT: 60 IN | HEART RATE: 84 BPM | TEMPERATURE: 97.6 F | OXYGEN SATURATION: 95 %

## 2020-08-13 DIAGNOSIS — N18.30 CKD (CHRONIC KIDNEY DISEASE), STAGE III: ICD-10-CM

## 2020-08-13 DIAGNOSIS — I10 ESSENTIAL HYPERTENSION: ICD-10-CM

## 2020-08-13 DIAGNOSIS — E55.9 VITAMIN D DEFICIENCY DISEASE: ICD-10-CM

## 2020-08-13 DIAGNOSIS — D64.9 ANEMIA, UNSPECIFIED TYPE: ICD-10-CM

## 2020-08-13 DIAGNOSIS — R80.9 MICROALBUMINURIA: ICD-10-CM

## 2020-08-13 PROCEDURE — 99214 OFFICE O/P EST MOD 30 MIN: CPT | Performed by: INTERNAL MEDICINE

## 2020-08-13 ASSESSMENT — ENCOUNTER SYMPTOMS
PALPITATIONS: 0
SHORTNESS OF BREATH: 0
FLANK PAIN: 0
DIARRHEA: 0
ABDOMINAL PAIN: 0
COUGH: 0
WEIGHT LOSS: 0
ORTHOPNEA: 0
HEMOPTYSIS: 0
EYES NEGATIVE: 1
SINUS PAIN: 0
FEVER: 0
NAUSEA: 0
VOMITING: 0
CHILLS: 0
WHEEZING: 0

## 2020-08-13 NOTE — PROGRESS NOTES
Subjective:      Jammie Berry is a 79 y.o. female who presents with Follow-Up and Chronic Kidney Disease            Chronic Kidney Disease  Pertinent negatives include no abdominal pain, chest pain, chills, congestion, coughing, fever, nausea or vomiting.     Jammie is coming today for f/u of CKD III, HTN  Doing well , no complaints  No difficulties to urinate  No dysuria/hematuria/flank pain  No edema  CKD III -creat level stable at baseline  HTN:BP well controlled, compliant to low Na diet, medications    Review of Systems   Constitutional: Negative for chills, fever, malaise/fatigue and weight loss.   HENT: Negative for congestion, hearing loss and sinus pain.    Eyes: Negative.    Respiratory: Negative for cough, hemoptysis, shortness of breath and wheezing.    Cardiovascular: Negative for chest pain, palpitations, orthopnea and leg swelling.   Gastrointestinal: Negative for abdominal pain, diarrhea, nausea and vomiting.   Genitourinary: Negative for dysuria, flank pain, frequency, hematuria and urgency.   Skin: Negative.    All other systems reviewed and are negative.    Past Medical History:   Diagnosis Date   • Abnormal Pap smear of cervix     Dr. 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       Family History   Problem Relation Age of Onset   • Heart Attack Mother    • Alcohol abuse Mother         etoh   • Lung Disease Father         smoker   • Alcohol abuse Father         etoh   • Heart Attack Father    • Hypertension Sister    • Hyperlipidemia Sister    • Lung Disease Sister         smoker   • Colon Cancer Sister    • Lung Disease Maternal Aunt    • Heart Disease Maternal Aunt    • Hypertension Maternal Aunt    • Hyperlipidemia Maternal Aunt    • Stroke Maternal Grandmother    • Other Maternal Grandmother         TB of the brain   • Stroke  Maternal Grandfather    • Colon Cancer Paternal Grandmother    • No Known Problems Paternal Grandfather        Social History     Socioeconomic History   • Marital status:      Spouse name: Not on file   • Number of children: 0   • Years of education: Not on file   • Highest education level: Not on file   Occupational History   • Occupation: retired CNA   Social Needs   • Financial resource strain: Not on file   • Food insecurity     Worry: Not on file     Inability: Not on file   • Transportation needs     Medical: Not on file     Non-medical: Not on file   Tobacco Use   • Smoking status: Former Smoker     Packs/day: 0.25     Years: 12.00     Pack years: 3.00     Types: Cigarettes     Quit date: 1977     Years since quittin.6   • Smokeless tobacco: Never Used   • Tobacco comment: Started smoking at age 25 (social smoker)   Substance and Sexual Activity   • Alcohol use: No     Alcohol/week: 0.0 oz   • Drug use: No   • Sexual activity: Never     Partners: Male     Comment: vaginal atrophy-dysparunia   Lifestyle   • Physical activity     Days per week: Not on file     Minutes per session: Not on file   • Stress: Not on file   Relationships   • Social connections     Talks on phone: Not on file     Gets together: Not on file     Attends Jewish service: Not on file     Active member of club or organization: Not on file     Attends meetings of clubs or organizations: Not on file     Relationship status: Not on file   • Intimate partner violence     Fear of current or ex partner: Not on file     Emotionally abused: Not on file     Physically abused: Not on file     Forced sexual activity: Not on file   Other Topics Concern   • Not on file   Social History Narrative   • Not on file          Objective:     /68 (BP Location: Right arm, Patient Position: Sitting)   Pulse 84   Temp 36.4 °C (97.6 °F)   Resp 16   Ht 1.524 m (5')   Wt 103.4 kg (228 lb)   SpO2 95%   BMI 44.53 kg/m²      Physical  Exam  Vitals signs reviewed.   Constitutional:       General: She is not in acute distress.     Appearance: Normal appearance. She is well-developed. She is not diaphoretic.   HENT:      Head: Normocephalic and atraumatic.      Nose: Nose normal.      Mouth/Throat:      Mouth: Mucous membranes are moist.      Pharynx: Oropharynx is clear.   Eyes:      General: No scleral icterus.     Extraocular Movements: Extraocular movements intact.      Conjunctiva/sclera: Conjunctivae normal.      Pupils: Pupils are equal, round, and reactive to light.   Neck:      Musculoskeletal: Normal range of motion and neck supple.   Cardiovascular:      Rate and Rhythm: Normal rate and regular rhythm.      Pulses: Normal pulses.      Heart sounds: Normal heart sounds. No friction rub. No gallop.    Pulmonary:      Effort: Pulmonary effort is normal. No respiratory distress.      Breath sounds: Normal breath sounds. No wheezing, rhonchi or rales.   Abdominal:      General: Bowel sounds are normal. There is no distension.      Palpations: Abdomen is soft. There is no mass.      Tenderness: There is no abdominal tenderness. There is no right CVA tenderness, left CVA tenderness or guarding.   Musculoskeletal: Normal range of motion.         General: No swelling.      Right lower leg: No edema.      Left lower leg: No edema.   Skin:     General: Skin is warm.      Coloration: Skin is not jaundiced.      Findings: No erythema or rash.   Neurological:      General: No focal deficit present.      Mental Status: She is alert and oriented to person, place, and time.      Cranial Nerves: No cranial nerve deficit.      Coordination: Coordination normal.   Psychiatric:         Mood and Affect: Mood normal.         Behavior: Behavior normal.         Thought Content: Thought content normal.         Judgment: Judgment normal.               Laboratory results reviewed; d/w pt  Lab Results   Component Value Date/Time    CREATININE 1.14 07/28/2020 06:26 AM     POTASSIUM 4.6 07/28/2020 06:26 AM       Assessment/Plan:     1. CKD (chronic kidney disease), stage III (HCC)    Creat level stable at baseline -to monitor      2. Essential hypertension      BP well controlled      Continue current treatment    3. Vitamin D deficiency disease      Vit D level well controlled WNL    4. Anemia, unspecified type      Hb stable -WNL    Recs: continue current treatment, low salt diet, keep well hydrated, avoid NSAID's             F/u in 6 months

## 2020-08-14 ENCOUNTER — PATIENT OUTREACH (OUTPATIENT)
Dept: HEALTH INFORMATION MANAGEMENT | Facility: OTHER | Age: 80
End: 2020-08-14

## 2020-08-14 SDOH — ECONOMIC STABILITY: FOOD INSECURITY: WITHIN THE PAST 12 MONTHS, YOU WORRIED THAT YOUR FOOD WOULD RUN OUT BEFORE YOU GOT MONEY TO BUY MORE.: NEVER TRUE

## 2020-08-14 SDOH — ECONOMIC STABILITY: TRANSPORTATION INSECURITY
IN THE PAST 12 MONTHS, HAS LACK OF TRANSPORTATION KEPT YOU FROM MEETINGS, WORK, OR FROM GETTING THINGS NEEDED FOR DAILY LIVING?: NO

## 2020-08-14 SDOH — ECONOMIC STABILITY: TRANSPORTATION INSECURITY
IN THE PAST 12 MONTHS, HAS THE LACK OF TRANSPORTATION KEPT YOU FROM MEDICAL APPOINTMENTS OR FROM GETTING MEDICATIONS?: NO

## 2020-08-14 SDOH — ECONOMIC STABILITY: FOOD INSECURITY: WITHIN THE PAST 12 MONTHS, THE FOOD YOU BOUGHT JUST DIDN'T LAST AND YOU DIDN'T HAVE MONEY TO GET MORE.: NEVER TRUE

## 2020-08-14 NOTE — PROGRESS NOTES
1. Attempt #:1    2. [facility] Verified: yes    3. Verify PCP: yes    4. Review Care Team: yes      6. Reviewed/Updated the following with patient:       •   Communication Preference Obtained? YES       •   Preferred Pharmacy? YES       •   Preferred Lab? YES       •   Family History (document living status of immediate family members and if + hx of cancer, diabetes, hypertension, hyperlipidemia, heart attack, stroke) YES    7. Annual Wellness Visit Scheduling  · Scheduling Status:Scheduled     8. Care Gap Scheduling (Attempt to Schedule EACH Overdue Care Gap!)     Health Maintenance Due   Topic Date Due   • Annual Wellness Visit  06/04/2020   • IMM ZOSTER VACCINES (3 of 3) 07/09/2020        Scheduled patient for Annual Wellness Visit     9. MyChart Activation: declined        13. Patient was advised: “This is a free wellness visit. The provider will screen for medical conditions to help you stay healthy. If you have other concerns to address you may be asked to discuss these at a separate visit or there may be an additional fee.”     14. Patient was informed to arrive 15 min prior to their scheduled appointment and bring in their medication bottles.        Called member to introduce myself as their SCP  and schedule AHA/AWV. Member had no questions at this time, direct phone number given for future contact.

## 2020-08-19 RX ORDER — TELMISARTAN 40 MG/1
TABLET ORAL
Qty: 100 TAB | Refills: 1 | Status: SHIPPED | OUTPATIENT
Start: 2020-08-19 | End: 2020-10-20 | Stop reason: SDUPTHER

## 2020-09-21 RX ORDER — LEVOTHYROXINE SODIUM 0.05 MG/1
TABLET ORAL
Qty: 90 TAB | Refills: 1 | Status: SHIPPED | OUTPATIENT
Start: 2020-09-21 | End: 2020-10-20 | Stop reason: SDUPTHER

## 2020-10-20 ENCOUNTER — OFFICE VISIT (OUTPATIENT)
Dept: MEDICAL GROUP | Facility: PHYSICIAN GROUP | Age: 80
End: 2020-10-20
Payer: MEDICARE

## 2020-10-20 VITALS
DIASTOLIC BLOOD PRESSURE: 68 MMHG | OXYGEN SATURATION: 95 % | HEIGHT: 60 IN | TEMPERATURE: 98.5 F | WEIGHT: 229 LBS | BODY MASS INDEX: 44.96 KG/M2 | HEART RATE: 74 BPM | SYSTOLIC BLOOD PRESSURE: 112 MMHG

## 2020-10-20 DIAGNOSIS — Z13.6 SCREENING FOR CARDIOVASCULAR CONDITION: ICD-10-CM

## 2020-10-20 DIAGNOSIS — N18.32 STAGE 3B CHRONIC KIDNEY DISEASE: ICD-10-CM

## 2020-10-20 DIAGNOSIS — E03.9 ACQUIRED HYPOTHYROIDISM: ICD-10-CM

## 2020-10-20 DIAGNOSIS — I10 ESSENTIAL HYPERTENSION: ICD-10-CM

## 2020-10-20 DIAGNOSIS — G89.29 CHRONIC BILATERAL LOW BACK PAIN WITHOUT SCIATICA: ICD-10-CM

## 2020-10-20 DIAGNOSIS — M54.50 CHRONIC BILATERAL LOW BACK PAIN WITHOUT SCIATICA: ICD-10-CM

## 2020-10-20 DIAGNOSIS — Z23 NEED FOR VACCINATION: ICD-10-CM

## 2020-10-20 PROCEDURE — 90662 IIV NO PRSV INCREASED AG IM: CPT | Performed by: NURSE PRACTITIONER

## 2020-10-20 PROCEDURE — 90750 HZV VACC RECOMBINANT IM: CPT | Performed by: NURSE PRACTITIONER

## 2020-10-20 PROCEDURE — 90472 IMMUNIZATION ADMIN EACH ADD: CPT | Performed by: NURSE PRACTITIONER

## 2020-10-20 PROCEDURE — G0008 ADMIN INFLUENZA VIRUS VAC: HCPCS | Performed by: NURSE PRACTITIONER

## 2020-10-20 PROCEDURE — 99214 OFFICE O/P EST MOD 30 MIN: CPT | Mod: 25 | Performed by: NURSE PRACTITIONER

## 2020-10-20 RX ORDER — ATENOLOL 25 MG/1
TABLET ORAL
Qty: 90 TAB | Refills: 0 | Status: SHIPPED | OUTPATIENT
Start: 2020-10-20 | End: 2020-10-20 | Stop reason: SDUPTHER

## 2020-10-20 RX ORDER — ATENOLOL 25 MG/1
25 TABLET ORAL
Qty: 90 TAB | Refills: 3 | Status: SHIPPED | OUTPATIENT
Start: 2020-10-20 | End: 2021-12-22

## 2020-10-20 RX ORDER — TELMISARTAN 40 MG/1
40 TABLET ORAL
Qty: 100 TAB | Refills: 3 | Status: SHIPPED | OUTPATIENT
Start: 2020-10-20 | End: 2022-01-05

## 2020-10-20 RX ORDER — LEVOTHYROXINE SODIUM 0.05 MG/1
50 TABLET ORAL
Qty: 90 TAB | Refills: 3 | Status: SHIPPED | OUTPATIENT
Start: 2020-10-20 | End: 2021-12-13

## 2020-10-23 NOTE — PROGRESS NOTES
Chief Complaint   Patient presents with   • Follow-Up   • Back Pain         Subjective:     Jammie Berry is a 80 y.o. female presenting for follow-up.    Back pain: Chronic, uncontrolled.  Patient utilizes Tylenol as needed.  States it is worse with housework so she stop and rest frequently.  This occurs bilateral low back switches sides, does not radiate, lead to sciatica or radiculopathies.  Though this is quite bothersome, she is content with her current treatment regimen and does not want to seek out additional medication, diagnostics, care at this time.    Hypothyroidism: Chronic, controlled.  Tolerating 50 mcg levothyroxine well.  Takes daily.  Updated labs indicated.    Hypertension: Chronic, controlled.  Blood pressure 112/68 today.  Tight control indicated due to chronic kidney disease as well.  Utilizes telmisartan 40 mg as well as 25 mg atenolol.  Does not experience chest pain, headache, shortness of breath, abnormal swelling, syncopal episodes.    Chronic kidney disease: Chronic, controlled.  Patient follows closely with nephrology, Dr. Mae.  Avoids nephrotoxins diligently.    Up-to-date on colonoscopy, next due in 2025.  Exercises routinely with stationary bike at home.      Review of systems:      Denies chest pain, shortness of breath, sore throat, difficulty swallowing, new cough, dizziness, severe headache, altered cognition, changes in bowel or bladder habits, decreased sensation, decreased strength, numbness or tingling, intolerable depression or anxiety, rash or skin concerns, changes in vision,  painful or swollen lymph nodes.       Current Outpatient Medications:   •  atenolol (TENORMIN) 25 MG Tab, Take 1 Tab by mouth every day., Disp: 90 Tab, Rfl: 3  •  levothyroxine (SYNTHROID) 50 MCG Tab, Take 1 Tab by mouth every day., Disp: 90 Tab, Rfl: 3  •  telmisartan (MICARDIS) 40 MG Tab, Take 1 Tab by mouth every day., Disp: 100 Tab, Rfl: 3  •  cyanocobalamin (VITAMIN B-12) 500 MCG Tab, Take  500 mcg by mouth every day., Disp: , Rfl:   •  Probiotic Product (PROBIOTIC PO), Take  by mouth., Disp: , Rfl:   •  Cholecalciferol (VITAMIN D-3 PO), Take 1,000 Units by mouth every day., Disp: , Rfl:   •  therapeutic multivitamin-minerals (THERAGRAN-M) TABS, Take 1 Tab by mouth every day., Disp: , Rfl:     Allergies, past medical history, past surgical history, family history, social history reviewed and updated    Objective:     Vitals: /68 (BP Location: Right arm, Patient Position: Sitting, BP Cuff Size: Large adult)   Pulse 74   Temp 36.9 °C (98.5 °F) (Temporal)   Ht 1.524 m (5')   Wt 103.9 kg (229 lb)   SpO2 95%   BMI 44.72 kg/m²   Constitutional: Alert, no distress, well-groomed.  Skin: Warm, dry, good turgor, no rashes in visible areas.  Eye: Equal, round and reactive, conjunctiva clear, lids normal.  ENMT: Lips without lesions, good dentition, moist mucous membranes.  Neck: Trachea midline, no masses, no thyromegaly.  Respiratory: Unlabored respiratory effort, no cough.  MSK: Normal gait, moves all extremities.  Neuro: Grossly non-focal.   Psych: Alert and oriented x3, normal affect and mood.    Assessment/Plan:     Jammie was seen today for follow-up and back pain.    Diagnoses and all orders for this visit:    Chronic bilateral low back pain without sciatica: Discussed potential diagnostic work-up.  Movement of additional specialties such as physiatry.  Patient does not want to pursue this at this time so instead we discussed alternative strategies such as taking additional resting times when she is doing housework as well as engaging in routine gentle stretching.    Acquired hypothyroidism: Updated labs indicated.  Last TSH 3.72 done in November 2019  -     TSH WITH REFLEX TO FT4; Future  -     levothyroxine (SYNTHROID) 50 MCG Tab; Take 1 Tab by mouth every day.    Stage 3b chronic kidney disease: Chronic, stable.  GFR done in July 46 which is been consistent through the last year.   Continues to follow with nephrology  -     CBC WITH DIFFERENTIAL; Future  -     Comp Metabolic Panel; Future  -     telmisartan (MICARDIS) 40 MG Tab; Take 1 Tab by mouth every day.    Screening for cardiovascular condition  -     Lipid Profile; Future    Essential hypertension: Chronic, controlled.  Refills provided.  -     atenolol (TENORMIN) 25 MG Tab; Take 1 Tab by mouth every day.  -     telmisartan (MICARDIS) 40 MG Tab; Take 1 Tab by mouth every day.    Need for vaccination  Risk/benefits/alternatives reviewed, patient consented and tolerated well.  -     Shingrix Vaccine  -     Influenza Vaccine, High Dose (65+ Only)          Return in about 4 weeks (around 11/17/2020) for Annual Wellness.    Patient verbalized understanding and agreed to plan of care.  Encouraged to contact me with needs via [employer] or by phone if needed.      I have placed the above orders and discussed them with an approved delegating provider.  The MA is performing the below orders under the direction of Dr Crocker.    Please note that this dictation was created using voice recognition software. I have made every reasonable attempt to correct obvious errors, but I expect that there are errors of grammar and possibly content that I did not discover before finalizing the note.

## 2020-11-06 ENCOUNTER — TELEPHONE (OUTPATIENT)
Dept: MEDICAL GROUP | Facility: PHYSICIAN GROUP | Age: 80
End: 2020-11-06

## 2020-11-06 DIAGNOSIS — Z20.822 CLOSE EXPOSURE TO COVID-19 VIRUS: ICD-10-CM

## 2020-11-16 ENCOUNTER — APPOINTMENT (OUTPATIENT)
Dept: RADIOLOGY | Facility: MEDICAL CENTER | Age: 80
DRG: 177 | End: 2020-11-16
Attending: EMERGENCY MEDICINE
Payer: MEDICARE

## 2020-11-16 ENCOUNTER — HOSPITAL ENCOUNTER (INPATIENT)
Facility: MEDICAL CENTER | Age: 80
LOS: 2 days | DRG: 177 | End: 2020-11-19
Attending: EMERGENCY MEDICINE | Admitting: HOSPITALIST
Payer: MEDICARE

## 2020-11-16 DIAGNOSIS — U07.1 COVID-19: ICD-10-CM

## 2020-11-16 DIAGNOSIS — U07.1 PNEUMONIA DUE TO COVID-19 VIRUS: ICD-10-CM

## 2020-11-16 DIAGNOSIS — N18.32 STAGE 3B CHRONIC KIDNEY DISEASE: ICD-10-CM

## 2020-11-16 DIAGNOSIS — J12.82 PNEUMONIA DUE TO COVID-19 VIRUS: ICD-10-CM

## 2020-11-16 DIAGNOSIS — R09.02 HYPOXIA: ICD-10-CM

## 2020-11-16 PROBLEM — R74.8 ELEVATED LIVER ENZYMES: Status: ACTIVE | Noted: 2020-11-16

## 2020-11-16 PROBLEM — I95.9 HYPOTENSION: Status: ACTIVE | Noted: 2020-11-16

## 2020-11-16 PROBLEM — E87.1 HYPONATREMIA: Status: ACTIVE | Noted: 2020-11-16

## 2020-11-16 PROBLEM — R06.02 SOB (SHORTNESS OF BREATH): Status: ACTIVE | Noted: 2020-11-16

## 2020-11-16 LAB
ALBUMIN SERPL BCP-MCNC: 3.2 G/DL (ref 3.2–4.9)
ALBUMIN/GLOB SERPL: 0.8 G/DL
ALP SERPL-CCNC: 104 U/L (ref 30–99)
ALT SERPL-CCNC: 93 U/L (ref 2–50)
ANION GAP SERPL CALC-SCNC: 17 MMOL/L (ref 7–16)
AST SERPL-CCNC: 110 U/L (ref 12–45)
BASOPHILS # BLD AUTO: 0.4 % (ref 0–1.8)
BASOPHILS # BLD: 0.03 K/UL (ref 0–0.12)
BILIRUB SERPL-MCNC: 1 MG/DL (ref 0.1–1.5)
BUN SERPL-MCNC: 33 MG/DL (ref 8–22)
CALCIUM SERPL-MCNC: 8.6 MG/DL (ref 8.4–10.2)
CHLORIDE SERPL-SCNC: 99 MMOL/L (ref 96–112)
CO2 SERPL-SCNC: 16 MMOL/L (ref 20–33)
COVID ORDER STATUS COVID19: NORMAL
CREAT SERPL-MCNC: 1.29 MG/DL (ref 0.5–1.4)
D DIMER PPP IA.FEU-MCNC: 1.39 UG/ML (FEU) (ref 0–0.5)
EKG IMPRESSION: NORMAL
EOSINOPHIL # BLD AUTO: 0 K/UL (ref 0–0.51)
EOSINOPHIL NFR BLD: 0 % (ref 0–6.9)
ERYTHROCYTE [DISTWIDTH] IN BLOOD BY AUTOMATED COUNT: 40.9 FL (ref 35.9–50)
FLUAV RNA SPEC QL NAA+PROBE: NEGATIVE
FLUBV RNA SPEC QL NAA+PROBE: NEGATIVE
GLOBULIN SER CALC-MCNC: 4 G/DL (ref 1.9–3.5)
GLUCOSE SERPL-MCNC: 115 MG/DL (ref 65–99)
HAV IGM SERPL QL IA: NORMAL
HBV CORE IGM SER QL: NORMAL
HBV SURFACE AG SER QL: NORMAL
HCT VFR BLD AUTO: 40.1 % (ref 37–47)
HCV AB SER QL: NORMAL
HGB BLD-MCNC: 13.6 G/DL (ref 12–16)
IMM GRANULOCYTES # BLD AUTO: 0.03 K/UL (ref 0–0.11)
IMM GRANULOCYTES NFR BLD AUTO: 0.4 % (ref 0–0.9)
LYMPHOCYTES # BLD AUTO: 1.24 K/UL (ref 1–4.8)
LYMPHOCYTES NFR BLD: 17.4 % (ref 22–41)
MAGNESIUM SERPL-MCNC: 2.2 MG/DL (ref 1.5–2.5)
MCH RBC QN AUTO: 29.4 PG (ref 27–33)
MCHC RBC AUTO-ENTMCNC: 33.9 G/DL (ref 33.6–35)
MCV RBC AUTO: 86.6 FL (ref 81.4–97.8)
MONOCYTES # BLD AUTO: 0.65 K/UL (ref 0–0.85)
MONOCYTES NFR BLD AUTO: 9.1 % (ref 0–13.4)
NEUTROPHILS # BLD AUTO: 5.17 K/UL (ref 2–7.15)
NEUTROPHILS NFR BLD: 72.7 % (ref 44–72)
NRBC # BLD AUTO: 0 K/UL
NRBC BLD-RTO: 0 /100 WBC
NT-PROBNP SERPL IA-MCNC: 192 PG/ML (ref 0–125)
PLATELET # BLD AUTO: 290 K/UL (ref 164–446)
PMV BLD AUTO: 8.8 FL (ref 9–12.9)
POTASSIUM SERPL-SCNC: 4.3 MMOL/L (ref 3.6–5.5)
PROCALCITONIN SERPL-MCNC: 0.13 NG/ML
PROT SERPL-MCNC: 7.2 G/DL (ref 6–8.2)
RBC # BLD AUTO: 4.63 M/UL (ref 4.2–5.4)
SARS-COV-2 RNA RESP QL NAA+PROBE: DETECTED
SODIUM SERPL-SCNC: 132 MMOL/L (ref 135–145)
SPECIMEN SOURCE: ABNORMAL
TROPONIN T SERPL-MCNC: 16 NG/L (ref 6–19)
WBC # BLD AUTO: 7.1 K/UL (ref 4.8–10.8)

## 2020-11-16 PROCEDURE — 700111 HCHG RX REV CODE 636 W/ 250 OVERRIDE (IP): Performed by: HOSPITALIST

## 2020-11-16 PROCEDURE — 84145 PROCALCITONIN (PCT): CPT

## 2020-11-16 PROCEDURE — 80074 ACUTE HEPATITIS PANEL: CPT

## 2020-11-16 PROCEDURE — 71045 X-RAY EXAM CHEST 1 VIEW: CPT

## 2020-11-16 PROCEDURE — 99285 EMERGENCY DEPT VISIT HI MDM: CPT

## 2020-11-16 PROCEDURE — 93005 ELECTROCARDIOGRAM TRACING: CPT

## 2020-11-16 PROCEDURE — 96372 THER/PROPH/DIAG INJ SC/IM: CPT

## 2020-11-16 PROCEDURE — 99220 PR INITIAL OBSERVATION CARE,LEVL III: CPT | Performed by: HOSPITALIST

## 2020-11-16 PROCEDURE — G0378 HOSPITAL OBSERVATION PER HR: HCPCS

## 2020-11-16 PROCEDURE — 87502 INFLUENZA DNA AMP PROBE: CPT

## 2020-11-16 PROCEDURE — 83735 ASSAY OF MAGNESIUM: CPT

## 2020-11-16 PROCEDURE — A9270 NON-COVERED ITEM OR SERVICE: HCPCS | Performed by: HOSPITALIST

## 2020-11-16 PROCEDURE — 700102 HCHG RX REV CODE 250 W/ 637 OVERRIDE(OP): Performed by: HOSPITALIST

## 2020-11-16 PROCEDURE — 71275 CT ANGIOGRAPHY CHEST: CPT

## 2020-11-16 PROCEDURE — 84484 ASSAY OF TROPONIN QUANT: CPT

## 2020-11-16 PROCEDURE — U0003 INFECTIOUS AGENT DETECTION BY NUCLEIC ACID (DNA OR RNA); SEVERE ACUTE RESPIRATORY SYNDROME CORONAVIRUS 2 (SARS-COV-2) (CORONAVIRUS DISEASE [COVID-19]), AMPLIFIED PROBE TECHNIQUE, MAKING USE OF HIGH THROUGHPUT TECHNOLOGIES AS DESCRIBED BY CMS-2020-01-R: HCPCS

## 2020-11-16 PROCEDURE — 93005 ELECTROCARDIOGRAM TRACING: CPT | Performed by: EMERGENCY MEDICINE

## 2020-11-16 PROCEDURE — 83880 ASSAY OF NATRIURETIC PEPTIDE: CPT

## 2020-11-16 PROCEDURE — 85025 COMPLETE CBC W/AUTO DIFF WBC: CPT

## 2020-11-16 PROCEDURE — 85379 FIBRIN DEGRADATION QUANT: CPT

## 2020-11-16 PROCEDURE — 80053 COMPREHEN METABOLIC PANEL: CPT

## 2020-11-16 PROCEDURE — 36415 COLL VENOUS BLD VENIPUNCTURE: CPT

## 2020-11-16 PROCEDURE — 700117 HCHG RX CONTRAST REV CODE 255: Performed by: EMERGENCY MEDICINE

## 2020-11-16 RX ORDER — ONDANSETRON 2 MG/ML
4 INJECTION INTRAMUSCULAR; INTRAVENOUS EVERY 4 HOURS PRN
Status: DISCONTINUED | OUTPATIENT
Start: 2020-11-16 | End: 2020-11-19 | Stop reason: HOSPADM

## 2020-11-16 RX ORDER — ACETAMINOPHEN 325 MG/1
650 TABLET ORAL EVERY 6 HOURS PRN
Status: DISCONTINUED | OUTPATIENT
Start: 2020-11-16 | End: 2020-11-19 | Stop reason: HOSPADM

## 2020-11-16 RX ORDER — LEVOTHYROXINE SODIUM 0.05 MG/1
50 TABLET ORAL
Status: DISCONTINUED | OUTPATIENT
Start: 2020-11-17 | End: 2020-11-19 | Stop reason: HOSPADM

## 2020-11-16 RX ORDER — GUAIFENESIN 600 MG/1
600 TABLET, EXTENDED RELEASE ORAL EVERY 12 HOURS
Status: DISCONTINUED | OUTPATIENT
Start: 2020-11-16 | End: 2020-11-19 | Stop reason: HOSPADM

## 2020-11-16 RX ORDER — CHOLECALCIFEROL (VITAMIN D3) 125 MCG
500 CAPSULE ORAL DAILY
Status: DISCONTINUED | OUTPATIENT
Start: 2020-11-16 | End: 2020-11-19 | Stop reason: HOSPADM

## 2020-11-16 RX ORDER — LEVOTHYROXINE SODIUM 0.05 MG/1
50 TABLET ORAL
Status: DISCONTINUED | OUTPATIENT
Start: 2020-11-16 | End: 2020-11-16

## 2020-11-16 RX ORDER — VITAMIN B COMPLEX
1000 TABLET ORAL DAILY
Status: DISCONTINUED | OUTPATIENT
Start: 2020-11-16 | End: 2020-11-19 | Stop reason: HOSPADM

## 2020-11-16 RX ORDER — ONDANSETRON 4 MG/1
4 TABLET, ORALLY DISINTEGRATING ORAL EVERY 4 HOURS PRN
Status: DISCONTINUED | OUTPATIENT
Start: 2020-11-16 | End: 2020-11-19 | Stop reason: HOSPADM

## 2020-11-16 RX ORDER — BENZONATATE 100 MG/1
100 CAPSULE ORAL 3 TIMES DAILY
Status: DISCONTINUED | OUTPATIENT
Start: 2020-11-16 | End: 2020-11-19 | Stop reason: HOSPADM

## 2020-11-16 RX ADMIN — ENOXAPARIN SODIUM 40 MG: 40 INJECTION SUBCUTANEOUS at 13:37

## 2020-11-16 RX ADMIN — BENZONATATE 100 MG: 100 CAPSULE ORAL at 13:37

## 2020-11-16 RX ADMIN — BENZONATATE 100 MG: 100 CAPSULE ORAL at 20:44

## 2020-11-16 RX ADMIN — GUAIFENESIN 600 MG: 600 TABLET, EXTENDED RELEASE ORAL at 20:44

## 2020-11-16 RX ADMIN — ACETAMINOPHEN 650 MG: 325 TABLET, FILM COATED ORAL at 16:11

## 2020-11-16 RX ADMIN — Medication 1000 UNITS: at 13:37

## 2020-11-16 RX ADMIN — IOHEXOL 65 ML: 350 INJECTION, SOLUTION INTRAVENOUS at 11:21

## 2020-11-16 ASSESSMENT — ENCOUNTER SYMPTOMS
FEVER: 0
BLURRED VISION: 0
HEMOPTYSIS: 0
TINGLING: 0
CHILLS: 1
WHEEZING: 0
BRUISES/BLEEDS EASILY: 0
BACK PAIN: 0
SHORTNESS OF BREATH: 1
PND: 0
NECK PAIN: 0
HEADACHES: 0
DIZZINESS: 0
DEPRESSION: 0
DOUBLE VISION: 0
ORTHOPNEA: 0
HEARTBURN: 0
PALPITATIONS: 0
VOMITING: 0
NAUSEA: 0
MYALGIAS: 0
COUGH: 1
SPUTUM PRODUCTION: 1

## 2020-11-16 ASSESSMENT — LIFESTYLE VARIABLES: SUBSTANCE_ABUSE: 0

## 2020-11-16 NOTE — ASSESSMENT & PLAN NOTE
Will check hepatitis panel.  No ruq pain  If not trending down will check US liver  Likely due to viral syndrome.

## 2020-11-16 NOTE — ASSESSMENT & PLAN NOTE
"\"Probably due to covid 19, SARS cov2 pending, will check procalcitonin, influenza  cxr showed b/l infiltrate  No leukocytosis  Continue supportive treatment, if procal is elevated will start abx, if sars cov2 positive will add decadron.   CTA chest pending.\"  Added decadron  No indication for anticoagulation  PT/OT ordered for weakness  [facility] planned  "

## 2020-11-16 NOTE — H&P
Hospital Medicine History & Physical Note    Date of Service  11/16/2020    Primary Care Physician  NORA Iyer    Consultants  none    Code Status  Full Code    Chief Complaint  Chief Complaint   Patient presents with   • Shortness of Breath   • Weakness       History of Presenting Illness  80 y.o. female who presented 11/16/2020 with pmh of HTN, hypothyroidism, is coming today due to increasing sob, patient stated that she is been sick for at least one week with flu like symptoms, productive cough clear sputum no hemoptysis along with sob, generalized weakness and malaise, patient's  also having same symptoms and he is hospitalized at the [facility] with covid 19, patient today she was feeling more sob and decided to come to hospital, patient is alert and oriented she was fount to be hypoxic she was placed on 2L of o2 and she is feeling a bit better now, patient in the ER cxr showed Patchy bilateral peripheral infiltrates, ddimer elevated, mildly elevated LFT's, patient's SARSCOV2 pending still, patient has mild diarrhea but no abdominal pain, no nausea or vomiting, no neck pain no focal weakness no numbness or tingling, patient will be admitted for observation pending CTA chest and SARS COV2 and procalcitonin result, patient expressed understanding of her plan of care and agreed with it, all questions answered.     Review of Systems  Review of Systems   Constitutional: Positive for chills and malaise/fatigue. Negative for fever.   HENT: Negative for congestion and nosebleeds.    Eyes: Negative for blurred vision and double vision.   Respiratory: Positive for cough, sputum production and shortness of breath. Negative for hemoptysis and wheezing.    Cardiovascular: Negative for chest pain, palpitations, orthopnea and PND.   Gastrointestinal: Negative for heartburn, nausea and vomiting.   Genitourinary: Negative for dysuria, frequency and urgency.   Musculoskeletal: Negative for back pain,  myalgias and neck pain.   Skin: Negative for rash.   Neurological: Negative for dizziness, tingling and headaches.   Endo/Heme/Allergies: Does not bruise/bleed easily.   Psychiatric/Behavioral: Negative for depression, substance abuse and suicidal ideas.       Past Medical History   has a past medical history of Abnormal Pap smear of cervix, CKD (chronic kidney disease), stage III (9/4/2015), Essential hypertension (9/4/2015), Other specified hypothyroidism (9/3/2015), and Tuberculosis.    Surgical History   has a past surgical history that includes tonsillectomy.     Family History  family history includes Alcohol abuse in her father and mother; Colon Cancer in her paternal grandmother and sister; Heart Attack in her father and mother; Heart Disease in her maternal aunt; Hyperlipidemia in her maternal aunt and sister; Hypertension in her maternal aunt and sister; Lung Disease in her father, maternal aunt, and sister; No Known Problems in her paternal grandfather; Other in her maternal grandmother; Stroke in her maternal grandfather and maternal grandmother.     Social History   reports that she quit smoking about 43 years ago. Her smoking use included cigarettes. She has a 3.00 pack-year smoking history. She has never used smokeless tobacco. She reports that she does not drink alcohol or use drugs.    Allergies  No Known Allergies    Medications  Prior to Admission Medications   Prescriptions Last Dose Informant Patient Reported? Taking?   Cholecalciferol (VITAMIN D-3) 25 MCG (1000 UT) Cap 11/16/2020 at AM Patient Yes No   Sig: Take 1,000 Units by mouth every day.   Probiotic Product (PROBIOTIC PO) 11/16/2020 at AM Patient Yes No   Sig: Take 1 Cap by mouth every morning.   atenolol (TENORMIN) 25 MG Tab 11/16/2020 at AM Patient No No   Sig: Take 1 Tab by mouth every day.   cyanocobalamin (VITAMIN B-12) 500 MCG Tab 11/16/2020 at AM Patient Yes No   Sig: Take 500 mcg by mouth every day.   levothyroxine (SYNTHROID) 50  MCG Tab 11/16/2020 at AM Patient No No   Sig: Take 1 Tab by mouth every day.   telmisartan (MICARDIS) 40 MG Tab 11/16/2020 at AM Patient No No   Sig: Take 1 Tab by mouth every day.   therapeutic multivitamin-minerals (THERAGRAN-M) TABS 11/16/2020 at AM Patient Yes No   Sig: Take 1 Tab by mouth every morning.      Facility-Administered Medications: None       Physical Exam  Temp:  [37.2 °C (99 °F)] 37.2 °C (99 °F)  Pulse:  [75-88] 75  Resp:  [14-22] 14  BP: ()/(58-98) 96/58  SpO2:  [84 %-96 %] 95 %    Physical Exam  Vitals signs and nursing note reviewed.   Constitutional:       Appearance: Normal appearance. She is ill-appearing.   HENT:      Head: Normocephalic.      Nose: Nose normal.      Mouth/Throat:      Mouth: Mucous membranes are moist.      Pharynx: Oropharynx is clear.   Eyes:      General:         Right eye: No discharge.         Left eye: No discharge.      Conjunctiva/sclera: Conjunctivae normal.      Pupils: Pupils are equal, round, and reactive to light.   Neck:      Musculoskeletal: Normal range of motion and neck supple.   Cardiovascular:      Rate and Rhythm: Normal rate and regular rhythm.      Pulses: Normal pulses.   Pulmonary:      Effort: Pulmonary effort is normal. No respiratory distress.      Breath sounds: Rales present. No wheezing.   Abdominal:      General: Bowel sounds are normal. There is no distension.      Palpations: Abdomen is soft.      Tenderness: There is no abdominal tenderness. There is no guarding.   Musculoskeletal: Normal range of motion.      Right lower leg: No edema.      Left lower leg: No edema.   Skin:     General: Skin is warm and dry.      Capillary Refill: Capillary refill takes less than 2 seconds.   Neurological:      General: No focal deficit present.      Mental Status: She is alert and oriented to person, place, and time.   Psychiatric:         Mood and Affect: Mood normal.         Laboratory:  Recent Labs     11/16/20  0910   WBC 7.1   RBC 4.63    HEMOGLOBIN 13.6   HEMATOCRIT 40.1   MCV 86.6   MCH 29.4   MCHC 33.9   RDW 40.9   PLATELETCT 290   MPV 8.8*     Recent Labs     11/16/20  0910   SODIUM 132*   POTASSIUM 4.3   CHLORIDE 99   CO2 16*   GLUCOSE 115*   BUN 33*   CREATININE 1.29   CALCIUM 8.6     Recent Labs     11/16/20  0910   ALTSGPT 93*   ASTSGOT 110*   ALKPHOSPHAT 104*   TBILIRUBIN 1.0   GLUCOSE 115*         Recent Labs     11/16/20  0910   NTPROBNP 192*         Recent Labs     11/16/20  0910   TROPONINT 16       Imaging:  DX-CHEST-PORTABLE (1 VIEW)   Final Result      Patchy bilateral peripheral infiltrates. Imaging features can be seen with COVID-19 pneumonia, though are nonspecific and can occur with a variety of infectious and noninfectious processes.      CT-CTA CHEST PULMONARY ARTERY W/ RECONS    (Results Pending)         Assessment/Plan:  I anticipate this patient is appropriate for observation status at this time.    * SOB (shortness of breath)- (present on admission)  Assessment & Plan  Probably due to covid 19, SARS cov2 pending, will check procalcitonin, influenza  cxr showed b/l infiltrate  No leukocytosis  Continue supportive treatment, if procal is elevated will start abx, if sars cov2 positive will add decadron.   CTA chest pending.     Hypotension- (present on admission)  Assessment & Plan  Likely dehydration.  Holding bp medications.     Elevated liver enzymes- (present on admission)  Assessment & Plan  Will check hepatitis panel.  No ruq pain  If not trending down will check US liver  Likely due to viral syndrome.     Hyponatremia- (present on admission)  Assessment & Plan  Mild, monitor.     Severe obesity (BMI >= 40) (HCC)- (present on admission)  Assessment & Plan  Body mass index is 43.16 kg/m².  Needs to lose weight    Essential hypertension- (present on admission)  Assessment & Plan  Holding bp medication due to hypotension  Will restart when bp is more stable.    Acquired hypothyroidism- (present on admission)  Assessment &  Plan  Continue supplement.      dvt prophylaxis lovenox.

## 2020-11-16 NOTE — ED NOTES
Pt BIB [facility] from home   [facility]    Chief Complaint   Patient presents with   • Shortness of Breath   • Weakness     Since Oct 5th  84% on RA at home with [facility]  96% on 2L NC    Pt A & 0 x 4, speech clear    COVID-19 screening criteria completed, pt denies high risk travel      Pt oriented to room, call light within reach, jose in lowest position    Report to Pallavi CARSON

## 2020-11-16 NOTE — ED PROVIDER NOTES
ED Provider Note    ED Provider Note    Primary care provider: NORA Iyre  Means of arrival: EMS  History obtained from: Patient    CHIEF COMPLAINT  Chief Complaint   Patient presents with   • Shortness of Breath   • Weakness     Seen at 9:06 AM.   HPI  Jammie Berry is a 80 y.o. female who presents to the Emergency Department with Covid.  The patient states that her  tested positive for Covid about 1 week ago.  Both she and her  have been suffering with a cough productive of some greenish sputum, shortness of breath and malaise.  He was admitted for hypoxia and has been at the [facility] for the past several days.  Today she noted that she was having difficulty walking.  She feels lightheaded also somewhat off balance.    She notes some back pain, she does report a history of chronic back pain.  Pain today is somewhat similar in the lower back without any radiation.  She denies any recent fevers but has had chills.  She denies any headache, chest pain, nausea or vomiting.  She has had significant diarrhea.  She has yet to have a Covid test performed.    REVIEW OF SYSTEMS  See HPI,   Remainder of ROS negative.     PAST MEDICAL HISTORY   has a past medical history of Abnormal Pap smear of cervix, CKD (chronic kidney disease), stage III (2015), Essential hypertension (2015), Other specified hypothyroidism (9/3/2015), and Tuberculosis.    SURGICAL HISTORY   has a past surgical history that includes tonsillectomy.    SOCIAL HISTORY  Social History     Tobacco Use   • Smoking status: Former Smoker     Packs/day: 0.25     Years: 12.00     Pack years: 3.00     Types: Cigarettes     Quit date: 1977     Years since quittin.9   • Smokeless tobacco: Never Used   • Tobacco comment: Started smoking at age 25 (social smoker)   Substance Use Topics   • Alcohol use: No     Alcohol/week: 0.0 oz   • Drug use: No      Social History     Substance and Sexual Activity   Drug Use  No       FAMILY HISTORY  Family History   Problem Relation Age of Onset   • Heart Attack Mother    • Alcohol abuse Mother         etoh   • Lung Disease Father         smoker   • Alcohol abuse Father         etoh   • Heart Attack Father    • Hypertension Sister    • Hyperlipidemia Sister    • Lung Disease Sister         smoker   • Colon Cancer Sister    • Lung Disease Maternal Aunt    • Heart Disease Maternal Aunt    • Hypertension Maternal Aunt    • Hyperlipidemia Maternal Aunt    • Stroke Maternal Grandmother    • Other Maternal Grandmother         TB of the brain   • Stroke Maternal Grandfather    • Colon Cancer Paternal Grandmother    • No Known Problems Paternal Grandfather        CURRENT MEDICATIONS  Reviewed.  See Encounter Summary.     ALLERGIES  No Known Allergies    PHYSICAL EXAM  VITAL SIGNS: /59   Pulse 71   Temp 37.2 °C (99 °F) (Oral)   Resp 17   Ht 1.524 m (5')   Wt 100.2 kg (221 lb)   SpO2 98%   BMI 43.16 kg/m²   Constitutional: Awake, alert in no apparent distress.  Oxygenation 84% on room air, 95% on 2 L nasal cannula.  HENT: Normocephalic, Bilateral external ears normal. Nose normal.   Eyes: Conjunctiva normal, non-icteric, EOMI.    Thorax & Lungs: Easy unlabored respirations, Clear to ascultation bilaterally.  Cardiovascular: Regular rate, Regular rhythm, No murmurs, rubs or gallops. Bilateral pulses symmetrical.   Abdomen:  Soft, nontender, nondistended, normal active bowel sounds.   :    Skin: Visualized skin is  Dry, No erythema, No rash.   Musculoskeletal:   No cyanosis, clubbing or edema. No leg asymmetry.   Neurologic: Alert, Grossly non-focal.  Cranial nerves II to XII intact, normal speech, finger-to-nose intact, no dysmetria, no drift, heel-to-shin intact.  Sensation to light touch throughout.  Psychiatric: Normal affect, Normal mood  Lymphatic:  No cervical LAD    EKG   12 lead Interpreted by me  Rhythm:  Normal sinus rhythm   Rate: 87  Axis: normal  Ectopy:  none  Conduction: normal  ST Segments: no acute change  T Waves: Borderline diffuse flattening of the T waves without prior EKGs for direct comparison.  Clinical Impression: Nonspecific EKG changes, normal sinus rhythm.    RADIOLOGY  CT-CTA CHEST PULMONARY ARTERY W/ RECONS   Final Result      No central or large segmental pulmonary embolus is identified.      Commonly reported imaging features of COVID-19 pneumonia are present. Other processes such as other infectious pneumonias, drug toxicity or connective tissue disease can cause a similar imaging pattern.      Hypodense hepatic lesions likely represent cysts.      Small hiatal hernia.                        DX-CHEST-PORTABLE (1 VIEW)   Final Result      Patchy bilateral peripheral infiltrates. Imaging features can be seen with COVID-19 pneumonia, though are nonspecific and can occur with a variety of infectious and noninfectious processes.            COURSE & MEDICAL DECISION MAKING  Pertinent Labs & Imaging studies reviewed. (See chart for details)    Differential diagnoses include but are not limited to: COVID-19 infection, less likely pulmonary embolism    9:06 AM - Medical record reviewed, patient seen and examined at bedside.    Decision Making:  This is a 80 y.o. year old female who presents with hypoxia, lightheadedness, shortness of breath.  Her  tested positive for COVID-19 [facility]  The patient is neurologically intact without any cerebellar findings, do not suspect acute CVA.  I did a D-dimer screen which is significantly positive.  CTA does not show any evidence of acute pulmonary embolus.  She does have findings of Covid as expected.  Labs are relatively unremarkable, the patient does have a mild anion gap acidosis.  Likely due to dehydration.  She has mild transaminitis.  Troponin within normal limits.  Due to her hypoxia the patient will need to be admitted for further evaluation and treatment of Covid.    I did  not order a sepsis bolus or antibiotics this is clearly is a viral process and patients will do poorly with IV fluids.  And these patients are    FINAL IMPRESSION  1. COVID-19    2. Hypoxia

## 2020-11-17 PROBLEM — U07.1 PNEUMONIA DUE TO COVID-19 VIRUS: Status: ACTIVE | Noted: 2020-11-16

## 2020-11-17 PROBLEM — J12.82 PNEUMONIA DUE TO COVID-19 VIRUS: Status: ACTIVE | Noted: 2020-11-16

## 2020-11-17 LAB
ALBUMIN SERPL BCP-MCNC: 3.1 G/DL (ref 3.2–4.9)
ALBUMIN/GLOB SERPL: 0.8 G/DL
ALP SERPL-CCNC: 99 U/L (ref 30–99)
ALT SERPL-CCNC: 77 U/L (ref 2–50)
ANION GAP SERPL CALC-SCNC: 14 MMOL/L (ref 7–16)
AST SERPL-CCNC: 70 U/L (ref 12–45)
BILIRUB SERPL-MCNC: 0.7 MG/DL (ref 0.1–1.5)
BUN SERPL-MCNC: 37 MG/DL (ref 8–22)
CALCIUM SERPL-MCNC: 8.8 MG/DL (ref 8.4–10.2)
CHLORIDE SERPL-SCNC: 101 MMOL/L (ref 96–112)
CO2 SERPL-SCNC: 19 MMOL/L (ref 20–33)
CREAT SERPL-MCNC: 1.31 MG/DL (ref 0.5–1.4)
ERYTHROCYTE [DISTWIDTH] IN BLOOD BY AUTOMATED COUNT: 43.9 FL (ref 35.9–50)
GLOBULIN SER CALC-MCNC: 3.9 G/DL (ref 1.9–3.5)
GLUCOSE SERPL-MCNC: 89 MG/DL (ref 65–99)
HCT VFR BLD AUTO: 40.7 % (ref 37–47)
HGB BLD-MCNC: 13.3 G/DL (ref 12–16)
MCH RBC QN AUTO: 29.4 PG (ref 27–33)
MCHC RBC AUTO-ENTMCNC: 32.7 G/DL (ref 33.6–35)
MCV RBC AUTO: 90 FL (ref 81.4–97.8)
PLATELET # BLD AUTO: 313 K/UL (ref 164–446)
PMV BLD AUTO: 9 FL (ref 9–12.9)
POTASSIUM SERPL-SCNC: 4.7 MMOL/L (ref 3.6–5.5)
PROT SERPL-MCNC: 7 G/DL (ref 6–8.2)
RBC # BLD AUTO: 4.52 M/UL (ref 4.2–5.4)
SODIUM SERPL-SCNC: 134 MMOL/L (ref 135–145)
WBC # BLD AUTO: 6.7 K/UL (ref 4.8–10.8)

## 2020-11-17 PROCEDURE — A9270 NON-COVERED ITEM OR SERVICE: HCPCS | Performed by: HOSPITALIST

## 2020-11-17 PROCEDURE — A9270 NON-COVERED ITEM OR SERVICE: HCPCS | Performed by: INTERNAL MEDICINE

## 2020-11-17 PROCEDURE — A9270 NON-COVERED ITEM OR SERVICE: HCPCS | Performed by: EMERGENCY MEDICINE

## 2020-11-17 PROCEDURE — 700111 HCHG RX REV CODE 636 W/ 250 OVERRIDE (IP): Performed by: HOSPITALIST

## 2020-11-17 PROCEDURE — 99233 SBSQ HOSP IP/OBS HIGH 50: CPT | Performed by: INTERNAL MEDICINE

## 2020-11-17 PROCEDURE — 80053 COMPREHEN METABOLIC PANEL: CPT

## 2020-11-17 PROCEDURE — 700102 HCHG RX REV CODE 250 W/ 637 OVERRIDE(OP): Performed by: HOSPITALIST

## 2020-11-17 PROCEDURE — 770020 HCHG ROOM/CARE - TELE (206)

## 2020-11-17 PROCEDURE — 700102 HCHG RX REV CODE 250 W/ 637 OVERRIDE(OP): Performed by: INTERNAL MEDICINE

## 2020-11-17 PROCEDURE — 96372 THER/PROPH/DIAG INJ SC/IM: CPT

## 2020-11-17 PROCEDURE — 85027 COMPLETE CBC AUTOMATED: CPT

## 2020-11-17 PROCEDURE — 700102 HCHG RX REV CODE 250 W/ 637 OVERRIDE(OP): Performed by: EMERGENCY MEDICINE

## 2020-11-17 RX ORDER — DEXAMETHASONE 4 MG/1
6 TABLET ORAL DAILY
Status: DISCONTINUED | OUTPATIENT
Start: 2020-11-17 | End: 2020-11-19 | Stop reason: HOSPADM

## 2020-11-17 RX ADMIN — BENZONATATE 100 MG: 100 CAPSULE ORAL at 06:04

## 2020-11-17 RX ADMIN — GUAIFENESIN 600 MG: 600 TABLET, EXTENDED RELEASE ORAL at 06:04

## 2020-11-17 RX ADMIN — CYANOCOBALAMIN TAB 500 MCG 500 MCG: 500 TAB at 06:04

## 2020-11-17 RX ADMIN — BENZONATATE 100 MG: 100 CAPSULE ORAL at 16:46

## 2020-11-17 RX ADMIN — LEVOTHYROXINE SODIUM 50 MCG: 0.05 TABLET ORAL at 06:04

## 2020-11-17 RX ADMIN — Medication 1000 UNITS: at 06:04

## 2020-11-17 RX ADMIN — ENOXAPARIN SODIUM 40 MG: 40 INJECTION SUBCUTANEOUS at 06:04

## 2020-11-17 RX ADMIN — DEXAMETHASONE 6 MG: 4 TABLET ORAL at 14:23

## 2020-11-17 RX ADMIN — GUAIFENESIN 600 MG: 600 TABLET, EXTENDED RELEASE ORAL at 16:46

## 2020-11-17 RX ADMIN — BENZONATATE 100 MG: 100 CAPSULE ORAL at 12:00

## 2020-11-17 ASSESSMENT — ENCOUNTER SYMPTOMS
EYE PAIN: 0
BLOOD IN STOOL: 0
HEMOPTYSIS: 0
VOMITING: 0
ABDOMINAL PAIN: 0
DIARRHEA: 0
EYE REDNESS: 0
FEVER: 0
TREMORS: 0
INSOMNIA: 0
PALPITATIONS: 0
CHILLS: 0
HEADACHES: 0
COUGH: 0
NAUSEA: 0
SHORTNESS OF BREATH: 1
NERVOUS/ANXIOUS: 0
SEIZURES: 0
MYALGIAS: 0
WHEEZING: 0
FALLS: 0
DIZZINESS: 0
WEAKNESS: 1
LOSS OF CONSCIOUSNESS: 0
CONSTIPATION: 0
FOCAL WEAKNESS: 0

## 2020-11-17 NOTE — ED NOTES
Attempted to call report to receiving RN - RN not aware that they are getting pt, and asked for a couple minutes to look over chart. Documenting RN honored and will call back in a little bit.

## 2020-11-17 NOTE — PROGRESS NOTES
Received pt from ED: A&O x4, VSS. No complaints of pain or discomfort. Put on 2L NC with O2 sats >90%. Call light within reach. Pt resting comfortably. Will continue to monitor and assess.

## 2020-11-17 NOTE — DIETARY
NUTRITION SERVICES: BMI - Pt with BMI >40 (=Body mass index is 43.16 kg/m².), morbid obesity. Weight loss counseling not appropriate in acute care setting. RECOMMEND - Referral to outpatient nutrition services for weight management after D/C.

## 2020-11-17 NOTE — DISCHARGE PLANNING
Anticipated Discharge Disposition: Unknown Home anticipated    Action: Unable to interview pt. Phone not receiving calls, Attempted Emergency cont [facility] also not receiving calls. [facility]  [facility], She was admitted SOB and oxygen started.     Barriers to Discharge: Support network unknown    Plan: Unknown

## 2020-11-17 NOTE — PROGRESS NOTES
Hospital Medicine Daily Progress Note    Date of Service  11/17/2020    Chief Complaint  80 y.o. female admitted 11/16/2020 with Shortness of Breath and Weakness        Hospital Course  No notes on file  History of HTN and hypothyroidism.  Presented with Shortness of Breath and Weakness  Her  also had similar symptoms, [facility].  At the ED, she is afebrile, hemodynamically stable but HYPOXIC  CTA:  No central or large segmental pulmonary embolus is identified.  Commonly reported imaging features of COVID-19 pneumonia are present. Other processes such as other infectious pneumonias, drug toxicity or connective tissue disease can cause a similar imaging pattern.  Hypodense hepatic lesions likely represent cysts.  Small hiatal hernia.  No leukocytosis  CoVID POSITIVE  Ddimer ELEVATED  Procalcitonin NORMAL/NEG       Interval Problem Update  She feels weak and feels she isn't ready for home.  Was on 1LO2    Consultants/Specialty      Code Status  Full Code    Disposition  PT/OT ordered may need HHC  Off O2.    Review of Systems  Review of Systems   Constitutional: Negative for chills and fever.   HENT: Negative for congestion, hearing loss and nosebleeds.    Eyes: Negative for pain and redness.   Respiratory: Positive for shortness of breath. Negative for cough, hemoptysis and wheezing.    Cardiovascular: Negative for chest pain and palpitations.   Gastrointestinal: Negative for abdominal pain, blood in stool, constipation, diarrhea, nausea and vomiting.   Genitourinary: Negative for dysuria, frequency and hematuria.   Musculoskeletal: Negative for falls, joint pain and myalgias.   Skin: Negative for rash.   Neurological: Positive for weakness. Negative for dizziness, tremors, focal weakness, seizures, loss of consciousness and headaches.   Psychiatric/Behavioral: The patient is not nervous/anxious and does not have insomnia.    All other systems reviewed and are negative.        Physical Exam  Temp:  [36.4 °C (97.5 °F)-36.9 °C (98.5 °F)] 36.9 °C (98.5 °F)  Pulse:  [61-84] 61  Resp:  [14-22] 20  BP: ()/(43-98) 103/67  SpO2:  [90 %-99 %] 96 %    Physical Exam  Vitals signs and nursing note reviewed.   Constitutional:       General: She is not in acute distress.     Appearance: She is ill-appearing.   HENT:      Head: Normocephalic and atraumatic.      Right Ear: External ear normal.      Left Ear: External ear normal.      Nose: Nose normal.      Mouth/Throat:      Mouth: Mucous membranes are moist.   Eyes:      General: No scleral icterus.     Conjunctiva/sclera: Conjunctivae normal.   Neck:      Musculoskeletal: Normal range of motion and neck supple. No neck rigidity.   Cardiovascular:      Rate and Rhythm: Normal rate and regular rhythm.      Pulses: Normal pulses.      Heart sounds: No murmur. No friction rub. No gallop.    Pulmonary:      Effort: Pulmonary effort is normal. No respiratory distress.      Breath sounds: Normal breath sounds. No stridor. No wheezing, rhonchi or rales.      Comments: Diminished at bases  Chest:      Chest wall: No tenderness.   Abdominal:      General: Abdomen is flat. Bowel sounds are normal. There is no distension.      Palpations: Abdomen is soft.      Tenderness: There is no abdominal tenderness. There is no guarding or rebound.   Musculoskeletal: Normal range of motion.         General: No swelling, tenderness or deformity.   Skin:     General: Skin is warm.      Coloration: Skin is not jaundiced.   Neurological:      General: No focal deficit present.      Mental Status: She is alert and oriented to person, place, and time. Mental status is at baseline.      Motor: Weakness present.   Psychiatric:         Mood and Affect: Mood normal.         Behavior: Behavior normal.         Thought Content: Thought content normal.         Judgment: Judgment normal.         Fluids  No intake or output data in the 24 hours ending 11/17/20  "0921    Laboratory  Recent Labs     11/16/20  0910 11/17/20  0602   WBC 7.1 6.7   RBC 4.63 4.52   HEMOGLOBIN 13.6 13.3   HEMATOCRIT 40.1 40.7   MCV 86.6 90.0   MCH 29.4 29.4   MCHC 33.9 32.7*   RDW 40.9 43.9   PLATELETCT 290 313   MPV 8.8* 9.0     Recent Labs     11/16/20  0910 11/17/20  0602   SODIUM 132* 134*   POTASSIUM 4.3 4.7   CHLORIDE 99 101   CO2 16* 19*   GLUCOSE 115* 89   BUN 33* 37*   CREATININE 1.29 1.31   CALCIUM 8.6 8.8                   Imaging  CT-CTA CHEST PULMONARY ARTERY W/ RECONS   Final Result      No central or large segmental pulmonary embolus is identified.      Commonly reported imaging features of COVID-19 pneumonia are present. Other processes such as other infectious pneumonias, drug toxicity or connective tissue disease can cause a similar imaging pattern.      Hypodense hepatic lesions likely represent cysts.      Small hiatal hernia.                        DX-CHEST-PORTABLE (1 VIEW)   Final Result      Patchy bilateral peripheral infiltrates. Imaging features can be seen with COVID-19 pneumonia, though are nonspecific and can occur with a variety of infectious and noninfectious processes.           Assessment/Plan  * Pneumonia due to COVID-19 virus, hypoxia- (present on admission)  Assessment & Plan  \"Probably due to covid 19, SARS cov2 pending, will check procalcitonin, influenza  cxr showed b/l infiltrate  No leukocytosis  Continue supportive treatment, if procal is elevated will start abx, if sars cov2 positive will add decadron.   CTA chest pending.\"  Added decadron  No indication for anticoagulation  PT/OT ordered for weakness    Hypotension- (present on admission)  Assessment & Plan  Likely dehydration.  Holding bp medications.     Elevated liver enzymes- (present on admission)  Assessment & Plan  Will check hepatitis panel.  No ruq pain  If not trending down will check US liver  Likely due to viral syndrome.     Hyponatremia- (present on admission)  Assessment & Plan  Mild, " monitor.     Severe obesity (BMI >= 40) ([facility])- (present on admission)  Assessment & Plan  Body mass index is 43.16 kg/m².  Needs to lose weight    Essential hypertension- (present on admission)  Assessment & Plan  Holding bp medication due to hypotension  Will restart when bp is more stable.    Acquired hypothyroidism- (present on admission)  Assessment & Plan  Continue supplement.       VTE prophylaxis: Lovenox SQ  Gastrointestinal prophylaxis: None  Antibiotics:   Ordered Anti-infectives (9999h ago, onward)    None        Diet:   Orders Placed This Encounter   Procedures   • Diet Order Diet: Regular     Standing Status:   Standing     Number of Occurrences:   1     Order Specific Question:   Diet:     Answer:   Regular [1]      Prognosis: Guarded  Risk: The Patient is at HIGH risk for inpatient complications and decompensation secondary to his multiple cormorbidities  listed above, especially   Problem   Pneumonia due to COVID-19 virus, hypoxia      I have personally reviewed notes, labs, vitals, imaging.  I discussed the plan of care with bedside RN as well as on multidisciplinary rounds  I have performed a physical exam and reviewed and updated ROS and Plan today 11/17/2020. In review of yesterday's note 11/16/2020   there are no changes except as documented above.

## 2020-11-17 NOTE — ED NOTES
Report called to receiving RN - Kenyatta - no questions or concerns. Pt is in stable condition. Pt to unit via tech.

## 2020-11-18 ENCOUNTER — HOME HEALTH ADMISSION (OUTPATIENT)
Dept: HOME HEALTH SERVICES | Facility: HOME HEALTHCARE | Age: 80
End: 2020-11-18
Payer: MEDICARE

## 2020-11-18 PROCEDURE — 770020 HCHG ROOM/CARE - TELE (206)

## 2020-11-18 PROCEDURE — 99233 SBSQ HOSP IP/OBS HIGH 50: CPT | Performed by: INTERNAL MEDICINE

## 2020-11-18 PROCEDURE — A9270 NON-COVERED ITEM OR SERVICE: HCPCS | Performed by: HOSPITALIST

## 2020-11-18 PROCEDURE — 700111 HCHG RX REV CODE 636 W/ 250 OVERRIDE (IP): Performed by: HOSPITALIST

## 2020-11-18 PROCEDURE — 700102 HCHG RX REV CODE 250 W/ 637 OVERRIDE(OP): Performed by: EMERGENCY MEDICINE

## 2020-11-18 PROCEDURE — 97162 PT EVAL MOD COMPLEX 30 MIN: CPT

## 2020-11-18 PROCEDURE — 700102 HCHG RX REV CODE 250 W/ 637 OVERRIDE(OP): Performed by: INTERNAL MEDICINE

## 2020-11-18 PROCEDURE — 700102 HCHG RX REV CODE 250 W/ 637 OVERRIDE(OP): Performed by: HOSPITALIST

## 2020-11-18 PROCEDURE — A9270 NON-COVERED ITEM OR SERVICE: HCPCS | Performed by: INTERNAL MEDICINE

## 2020-11-18 PROCEDURE — A9270 NON-COVERED ITEM OR SERVICE: HCPCS | Performed by: EMERGENCY MEDICINE

## 2020-11-18 RX ORDER — ONDANSETRON 4 MG/1
4 TABLET, ORALLY DISINTEGRATING ORAL EVERY 4 HOURS PRN
Qty: 10 TAB | Refills: 0 | COMMUNITY
Start: 2020-11-18 | End: 2020-11-21

## 2020-11-18 RX ORDER — GUAIFENESIN 600 MG/1
600 TABLET, EXTENDED RELEASE ORAL EVERY 12 HOURS
Qty: 28 TAB | COMMUNITY
Start: 2020-11-18 | End: 2021-01-14

## 2020-11-18 RX ORDER — BENZONATATE 100 MG/1
100 CAPSULE ORAL 3 TIMES DAILY
Qty: 60 CAP | Refills: 0 | Status: SHIPPED | OUTPATIENT
Start: 2020-11-18 | End: 2021-01-14

## 2020-11-18 RX ORDER — DEXAMETHASONE 6 MG/1
6 TABLET ORAL DAILY
Qty: 7 TAB | Refills: 0 | Status: SHIPPED | OUTPATIENT
Start: 2020-11-19 | End: 2020-11-26

## 2020-11-18 RX ADMIN — GUAIFENESIN 600 MG: 600 TABLET, EXTENDED RELEASE ORAL at 05:47

## 2020-11-18 RX ADMIN — LEVOTHYROXINE SODIUM 50 MCG: 0.05 TABLET ORAL at 05:47

## 2020-11-18 RX ADMIN — BENZONATATE 100 MG: 100 CAPSULE ORAL at 11:20

## 2020-11-18 RX ADMIN — CYANOCOBALAMIN TAB 500 MCG 500 MCG: 500 TAB at 05:47

## 2020-11-18 RX ADMIN — GUAIFENESIN 600 MG: 600 TABLET, EXTENDED RELEASE ORAL at 17:48

## 2020-11-18 RX ADMIN — Medication 1000 UNITS: at 05:47

## 2020-11-18 RX ADMIN — CYANOCOBALAMIN TAB 500 MCG 500 MCG: 500 TAB at 17:58

## 2020-11-18 RX ADMIN — BENZONATATE 100 MG: 100 CAPSULE ORAL at 05:47

## 2020-11-18 RX ADMIN — ENOXAPARIN SODIUM 40 MG: 40 INJECTION SUBCUTANEOUS at 05:46

## 2020-11-18 RX ADMIN — DEXAMETHASONE 6 MG: 4 TABLET ORAL at 05:47

## 2020-11-18 RX ADMIN — BENZONATATE 100 MG: 100 CAPSULE ORAL at 17:48

## 2020-11-18 ASSESSMENT — GAIT ASSESSMENTS
DEVIATION: BRADYKINETIC;DECREASED HEEL STRIKE;DECREASED TOE OFF
DISTANCE (FEET): 50

## 2020-11-18 ASSESSMENT — COGNITIVE AND FUNCTIONAL STATUS - GENERAL
WALKING IN HOSPITAL ROOM: A LITTLE
CLIMB 3 TO 5 STEPS WITH RAILING: A LITTLE
SUGGESTED CMS G CODE MODIFIER MOBILITY: CJ
MOBILITY SCORE: 21
STANDING UP FROM CHAIR USING ARMS: A LITTLE

## 2020-11-18 NOTE — FACE TO FACE
Face to Face Supporting Documentation - Home Health    The encounter with this patient was in whole or in part the primary reason for home health admission.    Date of encounter:   Patient:                    MRN:                       YOB: 2018  Jammie Berry  [id number]  1940     Home health to see patient for:  Skilled Nursing care for assessment, interventions & education, Physical Therapy evaluation and treatment and Occupational therapy evaluation and treatment    Skilled need for:  Comment: resp management covid pneumonia    Skilled nursing interventions to include:  Comment: as above    Homebound status evidenced by:  Needs the assistance of another person in order to leave the home. Leaving home requires a considerable and taxing effort. There is a normal inability to leave the home.    Community Physician to provide follow up care: [facility].     Optional Interventions? No      I certify the face to face encounter for this home health care referral meets the CMS requirements and the encounter/clinical assessment with the patient was, in whole, or in part, for the medical condition(s) listed above, which is the primary reason for home health care. Based on my clinical findings: the service(s) are medically necessary, support the need for home health care, and the homebound criteria are met.  I certify that this patient has had a face to face encounter by myself.  Solitario Manjarrez M.D. - NPI: [id number]

## 2020-11-18 NOTE — DISCHARGE PLANNING
Pt is agreeable to HHC with [facility] and Home O2 with [facility]. Choice forms completed and faxed to [facility].

## 2020-11-18 NOTE — DISCHARGE PLANNING

## 2020-11-18 NOTE — THERAPY
Physical Therapy   Initial Evaluation     Patient Name: Jammie Berry  Age:  80 y.o., Sex:  female  Medical Record #: [id number]  Today's Date: 11/18/2020     Precautions: Fall Risk (quick O2 desat with minimal exertion)    Assessment  Patient is 80 y.o. female presenting acutely with SOB and weakness found to be COVID +. [facility]. Pt presenting with generalized weakness and deconditioning. She is able to perform bed mob and transfers with increased time to perform task. Initial amb without AD revealed unsteady gait and pt reaching out for surfaces. She demonstrated improved balance/safety with use of FWW. Pt is concerned regarding DC home to care for herself and her  without support of her family as she does not want to infect them.     Plan    Recommend Physical Therapy 3 times per week until therapy goals are met for the following treatments:  Community Re-integration, Gait Training, Neuro Re-Education / Balance, Stair Training, Therapeutic Activities and Therapeutic Exercises    DC Equipment Recommendations: Front-Wheel Walker  Discharge Recommendations: Recommend home health for continued physical therapy services     Objective     11/18/20 1016   Prior Living Situation   Prior Services None   Housing / Facility Mobile Home   Steps Into Home 1 (1 through front, 3 through back)   Equipment Owned None   Lives with - Patient's Self Care Capacity Spouse (wh[facility])   Comments Pt concerned regarding caring for herself and  upon return home.   Prior Level of Functional Mobility   Bed Mobility Independent   Transfer Status Independent   Ambulation Independent   Distance Ambulation (Feet) (limited household distances d/t back pain ~50ft)   Assistive Devices Used None   Stairs Independent   Strength Lower Body   Comments BLE generalized weakness associated with deconditioning, equal bilaterally   Balance Assessment   Sitting Balance  (Static) Fair   Sitting Balance (Dynamic) Fair   Standing Balance (Static) Fair -   Standing Balance (Dynamic) Poor +   Gait Analysis   Gait Level Of Assist (CGA without AD, SPV with FWW)   Assistive Device None   Distance (Feet) 50   # of Times Distance was Traveled 2   Deviation Bradykinetic;Decreased Heel Strike;Decreased Toe Off   # of Stairs Climbed 0   Weight Bearing Status FWB   Bed Mobility    Supine to Sit Supervised   Sit to Supine (EOB post session per preference for O2 saturation rec.)   Functional Mobility   Bed, Chair, Wheelchair Transfer Supervised   Short Term Goals    Short Term Goal # 1 Pt will perform 50ft of amb with FWW and SPV within 6 visits to negotiate household distances safely at [place].   Short Term Goal # 2 Pt will ascend/descend platform walker with FWW and SPV within 6 visits to negotiate step to enter/exit home.

## 2020-11-18 NOTE — FACE TO FACE
Face to Face Note  -  Durable Medical Equipment    Solitario Manjarrez M.D. - NPI: [id number]  I certify that this patient is under my care and that they had a durable medical equipment(DME)face to face encounter by myself that meets the physician DME face-to-face encounter requirements with this patient on:    Date of encounter:   Patient:                    MRN:                       YOB: 2018  Jammie Berry  [id number]  1940     The encounter with the patient was in whole, or in part, for the following medical condition, which is the primary reason for durable medical equipment:  Other - CoVID pneumonia    I certify that, based on my findings, the following durable medical equipment is medically necessary:  Oxygen.    HOME O2 Saturation Measurements:(Values must be present for Home Oxygen orders)  Room air sat at rest: 86  Room air sat with amb: 86  With liters of O2: 2, O2 sat at rest with O2: 90  With Liters of O2: 2, O2 sat with amb with O2 : 88  Is the patient mobile?: Yes    My Clinical findings support the need for the above equipment due to:  Hypoxia    Supporting Symptoms: as above

## 2020-11-18 NOTE — DISCHARGE PLANNING
Received Choice form at 1455  Agency/Facility Name: [facility]   Referral sent per Choice form @ 1500     Received Choice form at 1455  Agency/Facility Name: [facility]  Referral sent per Choice form @ 1500

## 2020-11-18 NOTE — PROGRESS NOTES
Received pt on 1L NC with O2 sats >90%. Ambulating to bathroom on RA with O2 sats 88-89% and recovering to >90% within a minute. WCTM.

## 2020-11-19 VITALS
HEIGHT: 60 IN | DIASTOLIC BLOOD PRESSURE: 56 MMHG | RESPIRATION RATE: 17 BRPM | HEART RATE: 92 BPM | OXYGEN SATURATION: 93 % | WEIGHT: 221 LBS | SYSTOLIC BLOOD PRESSURE: 98 MMHG | TEMPERATURE: 97.3 F | BODY MASS INDEX: 43.39 KG/M2

## 2020-11-19 PROCEDURE — 700102 HCHG RX REV CODE 250 W/ 637 OVERRIDE(OP): Performed by: HOSPITALIST

## 2020-11-19 PROCEDURE — 700111 HCHG RX REV CODE 636 W/ 250 OVERRIDE (IP): Performed by: HOSPITALIST

## 2020-11-19 PROCEDURE — 700102 HCHG RX REV CODE 250 W/ 637 OVERRIDE(OP): Performed by: EMERGENCY MEDICINE

## 2020-11-19 PROCEDURE — A9270 NON-COVERED ITEM OR SERVICE: HCPCS | Performed by: HOSPITALIST

## 2020-11-19 PROCEDURE — A9270 NON-COVERED ITEM OR SERVICE: HCPCS | Performed by: EMERGENCY MEDICINE

## 2020-11-19 PROCEDURE — 700102 HCHG RX REV CODE 250 W/ 637 OVERRIDE(OP): Performed by: INTERNAL MEDICINE

## 2020-11-19 PROCEDURE — A9270 NON-COVERED ITEM OR SERVICE: HCPCS | Performed by: INTERNAL MEDICINE

## 2020-11-19 PROCEDURE — 99239 HOSP IP/OBS DSCHRG MGMT >30: CPT | Performed by: INTERNAL MEDICINE

## 2020-11-19 RX ORDER — ACETAMINOPHEN 325 MG/1
650 TABLET ORAL EVERY 6 HOURS PRN
Qty: 30 TAB | Refills: 0 | COMMUNITY
Start: 2020-11-19

## 2020-11-19 RX ADMIN — GUAIFENESIN 600 MG: 600 TABLET, EXTENDED RELEASE ORAL at 05:56

## 2020-11-19 RX ADMIN — BENZONATATE 100 MG: 100 CAPSULE ORAL at 05:55

## 2020-11-19 RX ADMIN — Medication 1000 UNITS: at 05:55

## 2020-11-19 RX ADMIN — ENOXAPARIN SODIUM 40 MG: 40 INJECTION SUBCUTANEOUS at 05:55

## 2020-11-19 RX ADMIN — CYANOCOBALAMIN TAB 500 MCG 500 MCG: 500 TAB at 05:56

## 2020-11-19 RX ADMIN — LEVOTHYROXINE SODIUM 50 MCG: 0.05 TABLET ORAL at 05:56

## 2020-11-19 RX ADMIN — DEXAMETHASONE 6 MG: 4 TABLET ORAL at 05:55

## 2020-11-19 ASSESSMENT — ENCOUNTER SYMPTOMS
NERVOUS/ANXIOUS: 0
LOSS OF CONSCIOUSNESS: 0
DIARRHEA: 0
TREMORS: 0
SEIZURES: 0
PALPITATIONS: 0
HEADACHES: 0
WEAKNESS: 1
COUGH: 0
ABDOMINAL PAIN: 0
MYALGIAS: 0
INSOMNIA: 0
FEVER: 0
WHEEZING: 0
CHILLS: 0
FOCAL WEAKNESS: 0
CONSTIPATION: 0
VOMITING: 0
SHORTNESS OF BREATH: 1
DIZZINESS: 0
HEMOPTYSIS: 0
EYE PAIN: 0
NAUSEA: 0
BLOOD IN STOOL: 0
EYE REDNESS: 0
FALLS: 0

## 2020-11-19 NOTE — DISCHARGE INSTRUCTIONS
Discharge Instructions    Discharged to home by car with relative. Discharged via wheelchair, hospital escort: Yes.  Special equipment needed: Oxygen    Be sure to schedule a follow-up appointment with your primary care doctor or any specialists as instructed.     Discharge Plan:   Diet Plan: Discussed  Activity Level: Discussed  Confirmed Follow up Appointment: Patient to Call and Schedule Appointment  Confirmed Symptoms Management: Discussed  Medication Reconciliation Updated: Yes  Influenza Vaccine Indication: Not indicated: Previously immunized this influenza season and > 8 years of age    I understand that a diet low in cholesterol, fat, and sodium is recommended for good health. Unless I have been given specific instructions below for another diet, I accept this instruction as my diet prescription.   Other diet: Regular    Special Instructions: None    · Is patient discharged on Warfarin / Coumadin?   No   COVID-19: How to Protect Yourself and Others  Know how it spreads  · There is currently no vaccine to prevent coronavirus disease 2019 (COVID-19).  · The best way to prevent illness is to avoid being exposed to this virus.  · The virus is thought to spread mainly from person-to-person.  ? Between people who are in close contact with one another (within about 6 feet).  ? Through respiratory droplets produced when an infected person coughs, sneezes or talks.  ? These droplets can land in the mouths or noses of people who are nearby or possibly be inhaled into the lungs.  ? Some recent studies have suggested that COVID-19 may be spread by people who are not showing symptoms.  Everyone should  Clean your hands often  · Wash your hands often with soap and water for at least 20 seconds especially after you have been in a public place, or after blowing your nose, coughing, or sneezing.  · If soap and water are not readily available, use a hand  that contains at least 60% alcohol. Cover all surfaces of your  hands and rub them together until they feel dry.  · Avoid touching your eyes, nose, and mouth with unwashed hands.  Avoid close contact  · Stay home if you are sick.  · Avoid close contact with people who are sick.  · Put distance between yourself and other people.  ? Remember that some people without symptoms may be able to spread virus.  ? This is especially important for people who are at higher risk of getting very sick.www.cdc.gov/coronavirus/2019-ncov/need-extra-precautions/people-at-higher-risk.html  Cover your mouth and nose with a cloth face cover when around others  · You could spread COVID-19 to others even if you do not feel sick.  · Everyone should wear a cloth face cover when they have to go out in public, for example to the grocery store or to  other necessities.  ? Cloth face coverings should not be placed on young children under age 2, anyone who has trouble breathing, or is unconscious, incapacitated or otherwise unable to remove the mask without assistance.  · The cloth face cover is meant to protect other people in case you are infected.  · Do NOT use a facemask meant for a healthcare worker.  · Continue to keep about 6 feet between yourself and others. The cloth face cover is not a substitute for social distancing.  Cover coughs and sneezes  · If you are in a private setting and do not have on your cloth face covering, remember to always cover your mouth and nose with a tissue when you cough or sneeze or use the inside of your elbow.  · Throw used tissues in the trash.  · Immediately wash your hands with soap and water for at least 20 seconds. If soap and water are not readily available, clean your hands with a hand  that contains at least 60% alcohol.  Clean and disinfect  · Clean AND disinfect frequently touched surfaces daily. This includes tables, doorknobs, light switches, countertops, handles, desks, phones, keyboards, toilets, faucets, and sinks.  www.cdc.gov/coronavirus/2019-ncov/prevent-getting-sick/disinfecting-your-home.html  · If surfaces are dirty, clean them: Use detergent or soap and water prior to disinfection.  · Then, use a household disinfectant. You can see a list of EPA-registered household disinfectants here.  cdc.gov/coronavirus  05/05/2020  This information is not intended to replace advice given to you by your health care provider. Make sure you discuss any questions you have with your health care provider.  Document Released: 04/14/2020 Document Revised: 05/13/2020 Document Reviewed: 04/14/2020  Elsevier Patient Education © 2020 [employer]    COVID-19  COVID-19 is a respiratory infection that is caused by a virus called severe acute respiratory syndrome coronavirus 2 (SARS-CoV-2). The disease is also known as coronavirus disease or novel coronavirus. In some people, the virus may not cause any symptoms. In others, it may cause a serious infection. The infection can get worse quickly and can lead to complications, such as:  · Pneumonia, or infection of the lungs.  · Acute respiratory distress syndrome or ARDS. This is fluid build-up in the lungs.  · Acute respiratory failure. This is a condition in which there is not enough oxygen passing from the lungs to the body.  · Sepsis or septic shock. This is a serious bodily reaction to an infection.  · Blood clotting problems.  · Secondary infections due to bacteria or fungus.  The virus that causes COVID-19 is contagious. This means that it can spread from person to person through droplets from coughs and sneezes (respiratory secretions).  What are the causes?  This illness is caused by a virus. You may catch the virus by:  · Breathing in droplets from an infected person's cough or sneeze.  · Touching something, like a table or a doorknob, that was exposed to the virus (contaminated) and then touching your mouth, nose, or eyes.  What increases the risk?  Risk for infection  You are more likely  to be infected with this virus if you:  · Live in or travel to an area with a COVID-19 outbreak.  · Come in contact with a sick person who recently traveled to an area with a COVID-19 outbreak.  · Provide care for or live with a person who is infected with COVID-19.  Risk for serious illness  You are more likely to become seriously ill from the virus if you:  · Are 65 years of age or older.  · Have a long-term disease that lowers your body's ability to fight infection (immunocompromised).  · Live in a nursing home or long-term care facility.  · Have a long-term (chronic) disease such as:  ? Chronic lung disease, including chronic obstructive pulmonary disease or asthma  ? Heart disease.  ? Diabetes.  ? Chronic kidney disease.  ? Liver disease.  · Are obese.  What are the signs or symptoms?  Symptoms of this condition can range from mild to severe. Symptoms may appear any time from 2 to 14 days after being exposed to the virus. They include:  · A fever.  · A cough.  · Difficulty breathing.  · Chills.  · Muscle pains.  · A sore throat.  · Loss of taste or smell.  Some people may also have stomach problems, such as nausea, vomiting, or diarrhea.  Other people may not have any symptoms of COVID-19.  How is this diagnosed?  This condition may be diagnosed based on:  · Your signs and symptoms, especially if:  ? You live in an area with a COVID-19 outbreak.  ? You recently traveled to or from an area where the virus is common.  ? You provide care for or live with a person who was diagnosed with COVID-19.  · A physical exam.  · Lab tests, which may include:  ? A nasal swab to take a sample of fluid from your nose.  ? A throat swab to take a sample of fluid from your throat.  ? A sample of mucus from your lungs (sputum).  ? Blood tests.  · Imaging tests, which may include, X-rays, CT scan, or ultrasound.  How is this treated?  At present, there is no medicine to treat COVID-19. Medicines that treat other diseases are being  used on a trial basis to see if they are effective against COVID-19.  Your health care provider will talk with you about ways to treat your symptoms. For most people, the infection is mild and can be managed at home with rest, fluids, and over-the-counter medicines.  Treatment for a serious infection usually takes places in a hospital intensive care unit (ICU). It may include one or more of the following treatments. These treatments are given until your symptoms improve.  · Receiving fluids and medicines through an IV.  · Supplemental oxygen. Extra oxygen is given through a tube in the nose, a face mask, or a willard.  · Positioning you to lie on your stomach (prone position). This makes it easier for oxygen to get into the lungs.  · Continuous positive airway pressure (CPAP) or bi-level positive airway pressure (BPAP) machine. This treatment uses mild air pressure to keep the airways open. A tube that is connected to a motor delivers oxygen to the body.  · Ventilator. This treatment moves air into and out of the lungs by using a tube that is placed in your windpipe.  · Tracheostomy. This is a procedure to create a hole in the neck so that a breathing tube can be inserted.  · Extracorporeal membrane oxygenation (ECMO). This procedure gives the lungs a chance to recover by taking over the functions of the heart and lungs. It supplies oxygen to the body and removes carbon dioxide.  Follow these instructions at home:  Lifestyle  · If you are sick, stay home except to get medical care. Your health care provider will tell you how long to stay home. Call your health care provider before you go for medical care.  · Rest at home as told by your health care provider.  · Do not use any products that contain nicotine or tobacco, such as cigarettes, e-cigarettes, and chewing tobacco. If you need help quitting, ask your health care provider.  · Return to your normal activities as told by your health care provider. Ask your health  care provider what activities are safe for you.  General instructions  · Take over-the-counter and prescription medicines only as told by your health care provider.  · Drink enough fluid to keep your urine pale yellow.  · Keep all follow-up visits as told by your health care provider. This is important.  How is this prevented?    There is no vaccine to help prevent COVID-19 infection. However, there are steps you can take to protect yourself and others from this virus.  To protect yourself:   · Do not travel to areas where COVID-19 is a risk. The areas where COVID-19 is reported change often. To identify high-risk areas and travel restrictions, check the CDC travel website: wwwnc.cdc.gov/travel/notices  · If you live in, or must travel to, an area where COVID-19 is a risk, take precautions to avoid infection.  ? Stay away from people who are sick.  ? Wash your hands often with soap and water for 20 seconds. If soap and water are not available, use an alcohol-based hand .  ? Avoid touching your mouth, face, eyes, or nose.  ? Avoid going out in public, follow guidance from your state and local health authorities.  ? If you must go out in public, wear a cloth face covering or face mask.  ? Disinfect objects and surfaces that are frequently touched every day. This may include:  § Counters and tables.  § Doorknobs and light switches.  § Sinks and faucets.  § Electronics, such as phones, remote controls, keyboards, computers, and tablets.  To protect others:  If you have symptoms of COVID-19, take steps to prevent the virus from spreading to others.  · If you think you have a COVID-19 infection, contact your health care provider right away. Tell your health care team that you think you may have a COVID-19 infection.  · Stay home. Leave your house only to seek medical care. Do not use public transport.  · Do not travel while you are sick.  · Wash your hands often with soap and water for 20 seconds. If soap and  water are not available, use alcohol-based hand .  · Stay away from other members of your household. Let healthy household members care for children and pets, if possible. If you have to care for children or pets, wash your hands often and wear a mask. If possible, stay in your own room, separate from others. Use a different bathroom.  · Make sure that all people in your household wash their hands well and often.  · Cough or sneeze into a tissue or your sleeve or elbow. Do not cough or sneeze into your hand or into the air.  · Wear a cloth face covering or face mask.  Where to find more information  · Centers for Disease Control and Prevention: www.cdc.gov/coronavirus/2019-ncov/index.html  · World Health Organization: www.who.int/health-topics/coronavirus  Contact a health care provider if:  · You live in or have traveled to an area where COVID-19 is a risk and you have symptoms of the infection.  · You have had contact with someone who has COVID-19 and you have symptoms of the infection.  Get help right away if:  · You have trouble breathing.  · You have pain or pressure in your chest.  · You have confusion.  · You have bluish lips and fingernails.  · You have difficulty waking from sleep.  · You have symptoms that get worse.  These symptoms may represent a serious problem that is an emergency. Do not wait to see if the symptoms will go away. Get medical help right away. Call your local emergency services (911 in the [place] Do not drive yourself to the hospital. Let the emergency medical personnel know if you think you have COVID-19.  Summary  · COVID-19 is a respiratory infection that is caused by a virus. It is also known as coronavirus disease or novel coronavirus. It can cause serious infections, such as pneumonia, acute respiratory distress syndrome, acute respiratory failure, or sepsis.  · The virus that causes COVID-19 is contagious. This means that it can spread from person to person through  droplets from coughs and sneezes.  · You are more likely to develop a serious illness if you are 65 years of age or older, have a weak immunity, live in a nursing home, or have chronic disease.  · There is no medicine to treat COVID-19. Your health care provider will talk with you about ways to treat your symptoms.  · Take steps to protect yourself and others from infection. Wash your hands often and disinfect objects and surfaces that are frequently touched every day. Stay away from people who are sick and wear a mask if you are sick.  This information is not intended to replace advice given to you by your health care provider. Make sure you discuss any questions you have with your health care provider.  Document Released: 01/23/2020 Document Revised: 05/14/2020 Document Reviewed: 01/23/2020  [employer] Patient Education © 2020 [employer]    Home Oxygen Use, Adult  When a medical condition keeps you from getting enough oxygen, your health care provider may instruct you to take extra oxygen at home. Your health care provider will let you know:  · When to take oxygen.  · For how long to take oxygen.  · How quickly oxygen should be delivered (flow rate), in liters per minute (LPM or L/M).  Home oxygen can be given through:  · A mask.  · A nasal cannula. This is a device or tube that goes in the nostrils.  · A transtracheal catheter. This is a small, flexible tube placed in the trachea.  · A tracheostomy. This is a surgically made opening in the trachea.  These devices are connected with tubing to an oxygen source, such as:  · A tank. Tanks hold oxygen in gas form. They must be replaced when the oxygen is used up.  · A liquid oxygen device. This holds oxygen in liquid form. It must be replaced when the oxygen is used up.  · An oxygen concentrator machine. This filters oxygen in the room. It uses electricity, so you must have a backup cylinder of oxygen in case the power goes out.  Supplies needed:  To use oxygen, you  will need:  · A mask, nasal cannula, transtracheal catheter, or tracheostomy.  · An oxygen tank, a liquid oxygen device, or an oxygen concentrator.  · The tape that your health care provider recommends (optional).  If you use a transtracheal catheter and your prescribed flow rate is 1 LPM or greater, you will also need a humidifier.  Risks and complications  · Fire. This can happen if the oxygen is exposed to a heat source, flame, or spark.  · Injury to skin. This can happen if liquid oxygen touches your skin.  · Organ damage. This can happen if you get too little oxygen.  How to use oxygen  Your health care provider or a representative from your medical device company will show you how to use your oxygen device. Follow her or his instructions. The instructions may look something like this:  1. Wash your hands.  2. If you use an oxygen concentrator, make sure it is plugged in.  3. Place one end of the tube into the port on the tank, device, or machine.  4. Place the mask over your nose and mouth. Or, place the nasal cannula and secure it with tape if instructed. If you use a tracheostomy or transtracheal catheter, connect it to the oxygen source as directed.  5. Make sure the liter-flow setting on the machine is at the level prescribed by your health care provider.  6. Turn on the machine or adjust the knob on the tank or device to the correct liter-flow setting.  7. When you are done, turn off and unplug the machine, or turn the knob to OFF.  How to clean and care for the oxygen supplies  Nasal cannula  · Clean it with a warm, wet cloth daily or as needed.  · Wash it with a liquid soap once a week.  · Rinse it thoroughly once or twice a week.  · Replace it every 2-4 weeks.  · If you have an infection, such as a cold or pneumonia, change the cannula when you get better.  Mask  · Replace it every 2-4 weeks.  · If you have an infection, such as a cold or pneumonia, change the mask when you get better.  Humidifier  "bottle  · Wash the bottle between each refill:  ? Wash it with soap and warm water.  ? Rinse it thoroughly.  ? Disinfect it and its top.  ? Air-dry it.  · Make sure it is dry before you refill it.  Oxygen concentrator  · Clean the air filter at least twice a week according to directions from your home medical equipment and service company.  · Wipe down the cabinet every day. To do this:  ? Unplug the unit.  ? Wipe down the cabinet with a damp cloth.  ? Dry the cabinet.  Other equipment  · Change any extra tubing every 1-3 months.  · Follow instructions from your health care provider about taking care of any other equipment.  Safety tips  Fire safety tips    · Keep your oxygen and oxygen supplies at least 5 ft away from sources of heat, flames, and fletcher at all times.  · Do not allow smoking near your oxygen. Put up \"no smoking\" signs in your home. Avoid smoking areas when in public.  · Do not use materials that can burn (are flammable) while you use oxygen.  · When you go to a restaurant with portable oxygen, ask to be seated in the nonsmoking section.  · Keep a fire extinguisher close by. Let your fire department know that you have oxygen in your home.  · Test your home smoke detectors regularly.  Traveling  · Secure your oxygen tank in the vehicle so that it does not move around. Follow instructions from your medical device company about how to safely secure your tank.  · Make sure you have enough oxygen for the amount of time you will be away from home.  · If you are planning air travel, contact the airline to find out if they allow the use of an approved portable oxygen concentrator. You may also need documents from your health care provider and medical device company before you travel.  General safety tips  · If you use an oxygen cylinder, make sure it is in a stand or secured to an object that will not move (fixed object).  · If you use liquid oxygen, make sure its container is kept upright.  · If you use an " oxygen concentrator:  ? Tell your electric company. Make sure you are given priority service in the event that your power goes out.  ? Avoid using extension cords, if possible.  Follow these instructions at home:  · Use oxygen only as told by your health care provider.  · Do not use alcohol or other drugs that make you relax (sedating drugs) unless instructed. They can slow down your breathing rate and make it hard to get in enough oxygen.  · Know how and when to order a refill of oxygen.  · Always keep a spare tank of oxygen. Plan ahead for holidays when you may not be able to get a prescription filled.  · Use water-based lubricants on your lips or nostrils. Do not use oil-based products like petroleum jelly.  · To prevent skin irritation on your cheeks or behind your ears, tuck some gauze under the tubing.  Contact a health care provider if:  · You get headaches often.  · You have shortness of breath.  · You have a lasting cough.  · You have anxiety.  · You are sleepy all the time.  · You develop an illness that affects your breathing.  · You cannot exercise at your regular level.  · You are restless.  · You have difficult or irregular breathing, and it is getting worse.  · You have a fever.  · You have persistent redness under your nose.  Get help right away if:  · You are confused.  · You have blue lips or fingernails.  · You are struggling to breathe.  Summary  · Your health care provider or a representative from your medical device company will show you how to use your oxygen device. Follow her or his instructions.  · If you use an oxygen concentrator, make sure it is plugged in.  · Make sure the liter-flow setting on the machine is at the level prescribed by your health care provider.  · Keep your oxygen and oxygen supplies at least 5 ft away from sources of heat, flames, and fletcher at all times.  This information is not intended to replace advice given to you by your health care provider. Make sure you  discuss any questions you have with your health care provider.  Document Released: 03/09/2005 Document Revised: 06/06/2019 Document Reviewed: 07/11/2017  Elsevier Patient Education © 2020 [employer].      Depression / Suicide Risk    As you are discharged from this [facility] facility, it is important to learn how to keep safe from harming yourself.    Recognize the warning signs:  · Abrupt changes in personality, positive or negative- including increase in energy   · Giving away possessions  · Change in eating patterns- significant weight changes-  positive or negative  · Change in sleeping patterns- unable to sleep or sleeping all the time   · Unwillingness or inability to communicate  · Depression  · Unusual sadness, discouragement and loneliness  · Talk of wanting to die  · Neglect of personal appearance   · Rebelliousness- reckless behavior  · Withdrawal from people/activities they love  · Confusion- inability to concentrate     If you or a loved one observes any of these behaviors or has concerns about self-harm, here's what you can do:  · Talk about it- your feelings and reasons for harming yourself  · Remove any means that you might use to hurt yourself (examples: pills, rope, extension cords, firearm)  · Get professional help from the community (Mental Health, Substance Abuse, psychological counseling)  · Do not be alone:Call your Safe Contact- someone whom you trust who will be there for you.  · Call your local CRISIS HOTLINE 543-1742 or 507-636-0413  · Call your local Children's Mobile Crisis Response Team [place] (269) 378-7560 or www.[employer]  · Call the toll free National Suicide Prevention Hotlines   · National Suicide Prevention Lifeline 254-967-AMCB (4049)  · National Hope Line Network 800-SUICIDE (487-1324)

## 2020-11-19 NOTE — PROGRESS NOTES
Hospital Medicine Daily Progress Note    Date of Service  11/19/2020    Chief Complaint  80 y.o. female admitted 11/16/2020 with Shortness of Breath and Weakness        Hospital Course  No notes on file  History of HTN and hypothyroidism.  Presented with Shortness of Breath and Weakness  Her  also had similar symptoms, [facility].  At the ED, she is afebrile, hemodynamically stable but HYPOXIC  CTA:  No central or large segmental pulmonary embolus is identified.  Commonly reported imaging features of COVID-19 pneumonia are present. Other processes such as other infectious pneumonias, drug toxicity or connective tissue disease can cause a similar imaging pattern.  Hypodense hepatic lesions likely represent cysts.  Small hiatal hernia.  No leukocytosis  CoVID POSITIVE  Ddimer ELEVATED  Procalcitonin NORMAL/NEG       Interval Problem Update  Improving but deconditioned, planned for HHC    Consultants/Specialty      Code Status  Full Code    Disposition  HH, O2 need    Review of Systems  Review of Systems   Constitutional: Negative for chills and fever.   HENT: Negative for congestion, hearing loss and nosebleeds.    Eyes: Negative for pain and redness.   Respiratory: Positive for shortness of breath. Negative for cough, hemoptysis and wheezing.    Cardiovascular: Negative for chest pain and palpitations.   Gastrointestinal: Negative for abdominal pain, blood in stool, constipation, diarrhea, nausea and vomiting.   Genitourinary: Negative for dysuria, frequency and hematuria.   Musculoskeletal: Negative for falls, joint pain and myalgias.   Skin: Negative for rash.   Neurological: Positive for weakness. Negative for dizziness, tremors, focal weakness, seizures, loss of consciousness and headaches.   Psychiatric/Behavioral: The patient is not nervous/anxious and does not have insomnia.    All other systems reviewed and are negative.       Physical Exam  Temp:  [36 °C (96.8  °F)-36.7 °C (98 °F)] 36.1 °C (97 °F)  Pulse:  [71-84] 84  Resp:  [18-20] 18  BP: ()/(61-72) 119/71  SpO2:  [92 %-95 %] 92 %    Physical Exam  Vitals signs and nursing note reviewed.   Constitutional:       General: She is not in acute distress.     Appearance: She is ill-appearing.   HENT:      Head: Normocephalic and atraumatic.      Right Ear: External ear normal.      Left Ear: External ear normal.      Nose: Nose normal.      Mouth/Throat:      Mouth: Mucous membranes are moist.   Eyes:      General: No scleral icterus.     Conjunctiva/sclera: Conjunctivae normal.   Neck:      Musculoskeletal: Normal range of motion and neck supple. No neck rigidity.   Cardiovascular:      Rate and Rhythm: Normal rate and regular rhythm.      Pulses: Normal pulses.      Heart sounds: No murmur. No friction rub. No gallop.    Pulmonary:      Effort: Pulmonary effort is normal. No respiratory distress.      Breath sounds: Normal breath sounds. No stridor. No wheezing, rhonchi or rales.      Comments: Diminished at bases  Chest:      Chest wall: No tenderness.   Abdominal:      General: Abdomen is flat. Bowel sounds are normal. There is no distension.      Palpations: Abdomen is soft.      Tenderness: There is no abdominal tenderness. There is no guarding or rebound.   Musculoskeletal: Normal range of motion.         General: No swelling, tenderness or deformity.   Skin:     General: Skin is warm.      Coloration: Skin is not jaundiced.   Neurological:      General: No focal deficit present.      Mental Status: She is alert and oriented to person, place, and time. Mental status is at baseline.      Motor: Weakness (slight improvement) present.   Psychiatric:         Mood and Affect: Mood normal.         Behavior: Behavior normal.         Thought Content: Thought content normal.         Judgment: Judgment normal.         Fluids  No intake or output data in the 24 hours ending 11/19/20 0743    Laboratory  Recent Labs      "11/16/20  0910 11/17/20  0602   WBC 7.1 6.7   RBC 4.63 4.52   HEMOGLOBIN 13.6 13.3   HEMATOCRIT 40.1 40.7   MCV 86.6 90.0   MCH 29.4 29.4   MCHC 33.9 32.7*   RDW 40.9 43.9   PLATELETCT 290 313   MPV 8.8* 9.0     Recent Labs     11/16/20  0910 11/17/20  0602   SODIUM 132* 134*   POTASSIUM 4.3 4.7   CHLORIDE 99 101   CO2 16* 19*   GLUCOSE 115* 89   BUN 33* 37*   CREATININE 1.29 1.31   CALCIUM 8.6 8.8                   Imaging  CT-CTA CHEST PULMONARY ARTERY W/ RECONS   Final Result      No central or large segmental pulmonary embolus is identified.      Commonly reported imaging features of COVID-19 pneumonia are present. Other processes such as other infectious pneumonias, drug toxicity or connective tissue disease can cause a similar imaging pattern.      Hypodense hepatic lesions likely represent cysts.      Small hiatal hernia.                        DX-CHEST-PORTABLE (1 VIEW)   Final Result      Patchy bilateral peripheral infiltrates. Imaging features can be seen with COVID-19 pneumonia, though are nonspecific and can occur with a variety of infectious and noninfectious processes.           Assessment/Plan  * Pneumonia due to COVID-19 virus, hypoxia- (present on admission)  Assessment & Plan  \"Probably due to covid 19, SARS cov2 pending, will check procalcitonin, influenza  cxr showed b/l infiltrate  No leukocytosis  Continue supportive treatment, if procal is elevated will start abx, if sars cov2 positive will add decadron.   CTA chest pending.\"  Added decadron  No indication for anticoagulation  PT/OT ordered for weakness  [facility] planned    Hypotension- (present on admission)  Assessment & Plan  Likely dehydration.  Holding bp medications.     Elevated liver enzymes- (present on admission)  Assessment & Plan  Will check hepatitis panel.  No ruq pain  If not trending down will check US liver  Likely due to viral syndrome.     Hyponatremia- (present on admission)  Assessment & Plan  Mild, monitor.     Severe obesity " (BMI >= 40) (HCC)- (present on admission)  Assessment & Plan  Body mass index is 43.16 kg/m².  Needs to lose weight    Essential hypertension- (present on admission)  Assessment & Plan  Holding bp medication due to hypotension  Will restart when bp is more stable.    Acquired hypothyroidism- (present on admission)  Assessment & Plan  Continue supplement.       VTE prophylaxis: Lovenox SQ  Gastrointestinal prophylaxis: None  Antibiotics:   Ordered Anti-infectives (9999h ago, onward)    None        Diet:   Orders Placed This Encounter   Procedures   • Diet Order Diet: Regular     Standing Status:   Standing     Number of Occurrences:   1     Order Specific Question:   Diet:     Answer:   Regular [1]      Prognosis: Guarded  Risk: The Patient is at HIGH risk for inpatient complications and decompensation secondary to his multiple cormorbidities  listed above, especially   Problem   Pneumonia due to COVID-19 virus, hypoxia      I have personally reviewed notes, labs, vitals, imaging.  I discussed the plan of care with bedside RN as well as on multidisciplinary rounds  I have performed a physical exam and reviewed and updated ROS and Plan today 11/19/2020. In review of yesterday's note 11/18/2020   there are no changes except as documented above.

## 2020-11-19 NOTE — DISCHARGE SUMMARY
Discharge Summary    CHIEF COMPLAINT ON ADMISSION  Chief Complaint   Patient presents with   • Shortness of Breath   • Weakness       Reason for Admission  weakness     Admission Date  11/16/2020    CODE STATUS  Full Code    HPI & HOSPITAL COURSE  This is a 80 y.o. female here with Shortness of Breath and Weakness  Please review Dr. Gal Baxter M.D. notes for further details of history of present illness, past medical/social/family histories, allergies and medications.   No notes on file  History of HTN and hypothyroidism.  Presented with Shortness of Breath and Weakness  Her  also had similar symptoms, [facility].  At the ED, she is afebrile, hemodynamically stable but HYPOXIC  CTA:  No central or large segmental pulmonary embolus is identified.  Commonly reported imaging features of COVID-19 pneumonia are present. Other processes such as other infectious pneumonias, drug toxicity or connective tissue disease can cause a similar imaging pattern.  Hypodense hepatic lesions likely represent cysts.  Small hiatal hernia.  No leukocytosis  CoVID POSITIVE  Ddimer ELEVATED but no PE on CTA.  Procalcitonin NORMAL/NEG  She improved. No indication for anticoagulation or antibiotics. She required O2 at discharge. This was arranged. She also requested FWW and HHC and these were provided and arranged.    Her transaminitis likely fatty liver/steatoihepatitis. She is advised no alcohol, lifestyle modification with intended weight loss, exercise and healthy diet. Her viral hepatitis panel was NEGATIVE. We will continue to hold her blood pressure medications because of low normal blood pressures but we will restart her BB at a lower dose. She will continue Synthroid for hypothyroidism.    At discharge date, Jammie Rodrigueze Jamal afebrile and hemodynamically stable.  Jammie Seamanyva wanted to be discharged today.    Discharge Physical Exam  Vitals signs and nursing note  reviewed.   Constitutional:       General: She is not in acute distress.     Appearance: Malaised.  HENT:      Head: Normocephalic and atraumatic.      Right Ear: External ear normal.      Left Ear: External ear normal.      Nose: Nose normal.      Mouth/Throat:      Mouth: Mucous membranes are moist.   Eyes:      General: No scleral icterus.     Conjunctiva/sclera: Conjunctivae normal.   Neck:      Musculoskeletal: Normal range of motion and neck supple. No neck rigidity.   Cardiovascular:      Rate and Rhythm: Normal rate and regular rhythm.      Pulses: Normal pulses.      Heart sounds: No murmur. No friction rub. No gallop.    Pulmonary:      Effort: Pulmonary effort is normal. No respiratory distress.      Breath sounds: Normal breath sounds. No stridor. No wheezing, rhonchi or rales.      Comments: Diminished at bases  Chest:      Chest wall: No tenderness.   Abdominal:      General: Abdomen is flat. Bowel sounds are normal. There is no distension.      Palpations: Abdomen is soft.      Tenderness: There is no abdominal tenderness. There is no guarding or rebound.   Musculoskeletal: Normal range of motion.         General: No swelling, tenderness or deformity.   Skin:     General: Skin is warm.      Coloration: Skin is not jaundiced.   Neurological:      General: No focal deficit present.      Mental Status: She is alert and oriented to person, place, and time. Mental status is at baseline.      Motor: Weakness present.   Psychiatric:         Mood and Affect: Mood normal.         Behavior: Behavior normal.         Thought Content: Thought content normal.         Judgment: Judgment normal.        Imaging  CT-CTA CHEST PULMONARY ARTERY W/ RECONS   Final Result      No central or large segmental pulmonary embolus is identified.      Commonly reported imaging features of COVID-19 pneumonia are present. Other processes such as other infectious pneumonias, drug toxicity or connective tissue disease can cause a  similar imaging pattern.      Hypodense hepatic lesions likely represent cysts.      Small hiatal hernia.                        DX-CHEST-PORTABLE (1 VIEW)   Final Result      Patchy bilateral peripheral infiltrates. Imaging features can be seen with COVID-19 pneumonia, though are nonspecific and can occur with a variety of infectious and noninfectious processes.              Therefore, she is discharged in fair and stable condition to home with organized home healthcare and close outpatient follow-up.    The patient met 2-midnight criteria for an inpatient stay at the time of discharge.    Discharge Date  11/19/2020    FOLLOW UP ITEMS POST DISCHARGE      DISCHARGE DIAGNOSES  Principal Problem:    Pneumonia due to COVID-19 virus, hypoxia POA: Yes  Active Problems:    Acquired hypothyroidism POA: Yes      Overview: Diagnosed 2014. Managed with levothyroxine 50 mcg since.  Last TSH 1.24 in       August 2016. 3.17 TSH level 2.01    Essential hypertension POA: Yes      Overview: Stable on ARB and Beta blocker    Severe obesity (BMI >= 40) (HCC) POA: Yes    Hyponatremia POA: Yes    Elevated liver enzymes POA: Yes    Hypotension POA: Yes  Resolved Problems:    * No resolved hospital problems. *      FOLLOW UP  Future Appointments   Date Time Provider Department Center   12/29/2020  2:00 PM NORA Iyer [facility]     No follow-up provider specified.  Pending oxygen evaluation  Once above criteria met, ambulate patient if applicable, check blood pressure and oxygen. If not hypoxic, hypotensive, lightheaded, chest pain, short of breath, palpitations or unsteady; if tolerating food and moving bowels, then can discharge.  NURSING: Copy/summarize below, provide appointment contact information to provide at your patient discharge instruction sheet.  Follow up NORA Iyer in 1 week. We will hold blood pressure meds except BB. Advised Jammie Berry to check blood pressure at home at  least twice a day and have a log for primary provider to review.  Return to ER in the event of new or worsening symptoms. Please note importance of compliance and the patient has agreed to proceed with all medical recommendations and follow up plan indicated above. All medications come with benefits and risks. Risks may include permanent injury or death and these risks can be minimized with close reassessment and monitoring. Please make it to your scheduled follow ups with NORA Iyer, and/or specialists clinic.  Quarantine, mask, hand washing, proper hygiene and seeing a doctor if you have fever, hypoxia or respiratory/GI symptoms or symptoms of thrombosis (stroke/chest pain/new extremity swelling) are MUSTS. Self isolation if still symptomatic or if fever (must self isolate until NO fever for 72 hours from last temp without use of anti-fever medications).  As advised by the Centers for Disease Control and Prevention (CDC), please isolate yourself for at least 10 days since the onset of your symptoms AND one day without a fever without the use of fever reducing medications AND with improved symptoms.  This is true for everyone, even if your test is negative because of high rates of falsely negative results.  Restrict activities outside your home for 10 days. Do not go to work, school, public areas, or restaurants. Avoid using public transportation, ride-sharing, or taxis.  If your symptoms worsen or you need a return to work note please call the COVID line [phone].  If you become sicker (even if your initial COVID test is negative), have difficulty breathing, chest pain, you are unable to eat or drink enough, or have severe vomiting, diarrhea or weakness, you may need to return to the Emergency Department or contact your clinic provider for re-evaluation.  If you need care and are coming into the hospital or one of our clinics, inform the healthcare facility by phone that you have been tested  for COVID-19 prior to entry.  Put on a facemask before you enter the facility or before emergency services arrive if you have called 911.    Unless you have severe difficulty breathing you will be expected to wear your mask throughout your hospital/clinic visit in order to protect our staff and other employees.  Postpone all elective medical appointments within your isolation period, including but not limited to physical therapy, dental, chiropractic, routine check-ups, etc., until cleared by the healthline, local health department, or your personal physician. If you have any upcoming elective appointments, please contact that office directly in order to cancel, reschedule or be evaluated for a potential virtual visit.  IF YOU LIVE WITH OTHERS  Please have ALL household members quarantine until your test results are back.  They should not go into public, to  or school, or go to work.  If any of your household members need a work note, please have them call the COVID hotline at [phone].  If living with others, try to have your own bathroom and bedroom if possible.  Please try and wipe down high-touch surfaces (counters, tabletops, doorknobs, bathroom fixtures, toilets, phones, keyboards, tablets, and bedside tables) at least twice a day. Avoid sharing personal household items. You should not share dishes, drinking glasses, cups, eating utensils, towels, or bedding with other people in your home. After using these items, they should be washed thoroughly with soap and water.  Also, clean any surfaces that may have blood, stool, or body fluids on them. Use a household cleaning spray or wipe, according to the label instructions. Labels contain instructions for safe and effective use of the cleaning product including precautions you should take when applying the product, such as wearing gloves and making sure you have good ventilation during use of the product.   Clean your hands often. Wash your hands often  with soap and water for at least 20 seconds. If soap and water are not available, clean your hands with an alcohol-based hand  that contains at least 60% alcohol, covering all surfaces of your hands and rubbing them together until they feel dry. Soap and water should be used preferentially if hands are visibly dirty. Avoid touching your eyes, nose, and mouth with unwashed hands.   Cover your coughs and sneezes    Please see the resources below for more information.    CDC Corona Website https://www.cdc.gov/coronavirus/2019-ncov/index.html  General Information https://www.cdc.gov/coronavirus/2019-ncov/faq.html   [facility]: [phone]  [facility] Line [phone]        MEDICATIONS ON DISCHARGE     Medication List      START taking these medications      Instructions   benzocaine-menthol 15-3.6 MG Lozg  Commonly known as: CEPACOL   Dissolve 1 Lozenge in the mouth every 2 hours as needed.  Dose: 1 Lozenge     benzonatate 100 MG Caps  Commonly known as: TESSALON   Take 1 Cap by mouth 3 times a day.  Dose: 100 mg     dexamethasone 6 MG Tabs  Commonly known as: DECADRON   Take 1 Tab by mouth every day for 7 days.  Dose: 6 mg     guaiFENesin  MG Tb12  Commonly known as: MUCINEX   Take 1 Tab by mouth every 12 hours.  Dose: 600 mg     ondansetron 4 MG Tbdp  Commonly known as: ZOFRAN ODT   Take 1 Tab by mouth every four hours as needed for Nausea (give PO if IV route is unavailable.).  Dose: 4 mg        CONTINUE taking these medications      Instructions   atenolol 25 MG Tabs  Commonly known as: TENORMIN   Take 1 Tab by mouth every day.  Dose: 25 mg     cyanocobalamin 500 MCG Tabs  Commonly known as: VITAMIN B-12   Take 500 mcg by mouth every day.  Dose: 500 mcg     levothyroxine 50 MCG Tabs  Commonly known as: SYNTHROID   Take 1 Tab by mouth every day.  Dose: 50 mcg     PROBIOTIC PO   Take 1 Cap by mouth every morning.  Dose: 1 Cap     telmisartan 40 MG Tabs  Commonly known as:  MICARDIS   Take 1 Tab by mouth every day.  Dose: 40 mg     therapeutic multivitamin-minerals Tabs   Take 1 Tab by mouth every morning.  Dose: 1 Tab     Vitamin D-3 25 MCG (1000 UT) Caps   Take 1,000 Units by mouth every day.  Dose: 1,000 Units            Allergies  No Known Allergies    DIET  Orders Placed This Encounter   Procedures   • Diet Order Diet: Regular     Standing Status:   Standing     Number of Occurrences:   1     Order Specific Question:   Diet:     Answer:   Regular [1]       ACTIVITY  As per [facility]      CONSULTATIONS      PROCEDURES  Dx-chest-portable (1 View)    Result Date: 11/16/2020 11/16/2020 10:00 AM HISTORY/REASON FOR EXAM: Shortness of breath. Covid infection. TECHNIQUE/EXAM DESCRIPTION AND NUMBER OF VIEWS: Single portable view of the chest. COMPARISON: None FINDINGS: There are patchy bilateral peripheral infiltrates. There is no pleural effusion. The heart is normal in size.     Patchy bilateral peripheral infiltrates. Imaging features can be seen with COVID-19 pneumonia, though are nonspecific and can occur with a variety of infectious and noninfectious processes.    Ct-cta Chest Pulmonary Artery W/ Recons    Result Date: 11/16/2020 11/16/2020 11:01 AM HISTORY/REASON FOR EXAM:  PE suspected, intermediate prob, positive D-dimer; Covid positive TECHNIQUE/EXAM DESCRIPTION: CT angiogram scan for pulmonary embolism with contrast, with reconstructions. 1.25 mm helical sections were obtained from the lung apices through the lung bases following the rapid bolus administration of 65 mL of Omnipaque 350 nonionic contrast. Thin-section overlapping reconstruction interval was utilized. Coronal reconstructions were generated from the axial data. MIP post processing was performed and utilized for the interpretation. Low dose optimization technique was utilized for this CT exam including automated exposure control and adjustment of the mA and/or kV according to patient size. COMPARISON: Plain film from  the same day FINDINGS: Pulmonary Embolism: No. Main Pulmonary Arteries: No. Segmental branches: No. Subsegmental branches: No. Atherosclerotic plaque is seen in the aorta. Trace pericardial fluid is noted. No pleural effusion is identified. There are small mediastinal lymph nodes. Subcarinal lymph node measures 8 mm in short axis dimension. Right hilar lymph node measures 8 mm in short axis dimension. There are small axillary lymph nodes bilaterally. There are ill-defined groundglass opacities per particularly in the peripheral lung fields and the lung bases. No pneumothorax is identified. Limited views were obtained of the upper abdomen. There is a small hiatal hernia. Lesion in the peripheral right  lobe of the liver measures 1.9 cm. 3 cm hypodense hepatic lesion is also seen. These likely represent cysts. Degenerative changes are seen in the spine.     No central or large segmental pulmonary embolus is identified. Commonly reported imaging features of COVID-19 pneumonia are present. Other processes such as other infectious pneumonias, drug toxicity or connective tissue disease can cause a similar imaging pattern. Hypodense hepatic lesions likely represent cysts. Small hiatal hernia.       LABORATORY  Lab Results   Component Value Date    SODIUM 134 (L) 11/17/2020    POTASSIUM 4.7 11/17/2020    CHLORIDE 101 11/17/2020    CO2 19 (L) 11/17/2020    GLUCOSE 89 11/17/2020    BUN 37 (H) 11/17/2020    CREATININE 1.31 11/17/2020        Lab Results   Component Value Date    WBC 6.7 11/17/2020    HEMOGLOBIN 13.3 11/17/2020    HEMATOCRIT 40.7 11/17/2020    PLATELETCT 313 11/17/2020        Total time of the discharge process exceeds 40 minutes.

## 2020-11-20 NOTE — PROGRESS NOTES
Pt discharged to home with transport provided by [facility].  Pt has 02 tank provided by [facility] with her and is using 1.5L NC.  Pt was instructed and acknowledged an understanding to call [facility] when she gets home so they can deliver her concentrator.

## 2020-11-21 ENCOUNTER — HOME CARE VISIT (OUTPATIENT)
Dept: HOME HEALTH SERVICES | Facility: HOME HEALTHCARE | Age: 80
End: 2020-11-21
Payer: MEDICARE

## 2020-11-21 VITALS
WEIGHT: 221 LBS | SYSTOLIC BLOOD PRESSURE: 108 MMHG | DIASTOLIC BLOOD PRESSURE: 68 MMHG | BODY MASS INDEX: 43.39 KG/M2 | HEART RATE: 78 BPM | TEMPERATURE: 97.8 F | RESPIRATION RATE: 18 BRPM | HEIGHT: 60 IN | OXYGEN SATURATION: 92 %

## 2020-11-21 PROCEDURE — 665001 SOC-HOME HEALTH

## 2020-11-21 PROCEDURE — G0493 RN CARE EA 15 MIN HH/HOSPICE: HCPCS

## 2020-11-21 ASSESSMENT — ENCOUNTER SYMPTOMS: SEVERE DYSPNEA: 1

## 2020-11-21 ASSESSMENT — FIBROSIS 4 INDEX: FIB4 SCORE: 1.99

## 2020-11-22 SDOH — ECONOMIC STABILITY: HOUSING INSECURITY: EVIDENCE OF SMOKING MATERIAL: 0

## 2020-11-22 SDOH — ECONOMIC STABILITY: HOUSING INSECURITY
HOME SAFETY: ON EACH LEVEL OF THE HOME. PATIENT INSTRUCTED PATIENT/CAREGIVER TO MAKE ONE AS SOON AS POSSIBLE. PATIENT DOES NOT HAVE FLAMMABLE MATERIALS PRESENT IN THE HOME PRESENTING A FIRE HAZARD. NO EVIDENCE FOUND OF SMOKING MATERIALS PRESENT IN THE HOME.PT HAS

## 2020-11-22 SDOH — ECONOMIC STABILITY: HOUSING INSECURITY
HOME SAFETY: OXYGEN SAFETY RISK ASSESSMENT PERFORMED. PATIENT PT WAS GIVEN A NO SMOKING SIGN AND PROVIDED EDUCATION ABOUT WHY IT IS IMPORTANT TO PLACE ONE. PATIENT DOES HAVE A WORKING FIRE EXTINGUISHER PRESENT IN THE HOME. SMOKE ALARMS ARE PRESENT AND FUNCTIONAL

## 2020-11-22 SDOH — ECONOMIC STABILITY: HOUSING INSECURITY
HOME SAFETY: STEPS LEADING TO DOORWAY, NEEDS RAMP, NO GRAB BARS IN BATHROOM, MULTIPLE RUGS AND UNEVEN FLOORING, OXYGEN TUBING; PT EDUCATED ON HIGH FALL RISKS.

## 2020-11-22 ASSESSMENT — ENCOUNTER SYMPTOMS
SHORTNESS OF BREATH: T
NAUSEA: DENIES
VOMITING: DENIES

## 2020-11-22 ASSESSMENT — ACTIVITIES OF DAILY LIVING (ADL): OASIS_M1830: 05

## 2020-11-22 ASSESSMENT — PATIENT HEALTH QUESTIONNAIRE - PHQ9
2. FEELING DOWN, DEPRESSED, IRRITABLE, OR HOPELESS: 00
1. LITTLE INTEREST OR PLEASURE IN DOING THINGS: 00
CLINICAL INTERPRETATION OF PHQ2 SCORE: 0

## 2020-11-23 ENCOUNTER — HOME CARE VISIT (OUTPATIENT)
Dept: HOME HEALTH SERVICES | Facility: HOME HEALTHCARE | Age: 80
End: 2020-11-23
Payer: MEDICARE

## 2020-11-23 ENCOUNTER — ANTICOAGULATION MONITORING (OUTPATIENT)
Dept: MEDICAL GROUP | Facility: PHYSICIAN GROUP | Age: 80
End: 2020-11-23

## 2020-11-23 PROCEDURE — G0156 HHCP-SVS OF AIDE,EA 15 MIN: HCPCS

## 2020-11-23 PROCEDURE — G0152 HHCP-SERV OF OT,EA 15 MIN: HCPCS

## 2020-11-23 NOTE — PROGRESS NOTES
Received referral from [facility]. Medications reviewed. No clinically significant interactions noted.     ondansetron 4 MG Tbdp  Commonly known as: ZOFRAN ODT     Currently not taking Take 1 Tab by mouth every four hours as needed for Nausea (give PO if IV route is unavailable.).  Dose: 4 mg          Tylenol is on her med list, but was not on her discharge summary.        Ady Cooper, PharmD, MS, BCACP, [facility]  Phone [phone] fax [phone]    This note was created using voice recognition software (Dragon). The accuracy of the dictation is limited by the abilities of the software. I have reviewed the note prior to signing, however some errors in grammar and context are still possible. If you have any questions related to this note please do not hesitate to contact our office.

## 2020-11-24 ENCOUNTER — HOME CARE VISIT (OUTPATIENT)
Dept: HOME HEALTH SERVICES | Facility: HOME HEALTHCARE | Age: 80
End: 2020-11-24
Payer: MEDICARE

## 2020-11-24 VITALS
DIASTOLIC BLOOD PRESSURE: 60 MMHG | TEMPERATURE: 98.2 F | HEART RATE: 61 BPM | SYSTOLIC BLOOD PRESSURE: 92 MMHG | RESPIRATION RATE: 18 BRPM | OXYGEN SATURATION: 94 %

## 2020-11-24 VITALS
DIASTOLIC BLOOD PRESSURE: 64 MMHG | TEMPERATURE: 97.8 F | SYSTOLIC BLOOD PRESSURE: 102 MMHG | OXYGEN SATURATION: 96 % | RESPIRATION RATE: 18 BRPM | HEART RATE: 62 BPM

## 2020-11-24 PROCEDURE — G0493 RN CARE EA 15 MIN HH/HOSPICE: HCPCS

## 2020-11-24 PROCEDURE — G0151 HHCP-SERV OF PT,EA 15 MIN: HCPCS

## 2020-11-24 SDOH — ECONOMIC STABILITY: HOUSING INSECURITY: EVIDENCE OF SMOKING MATERIAL: 0

## 2020-11-24 SDOH — ECONOMIC STABILITY: HOUSING INSECURITY: UNSAFE COOKING RANGE AREA: 1

## 2020-11-24 SDOH — ECONOMIC STABILITY: HOUSING INSECURITY: HOME SAFETY: D. NARROW DOOR TO BATHROOM REQUIRES PATIENT TO TURN SIDEWAYS TO ENTER.

## 2020-11-24 SDOH — ECONOMIC STABILITY: HOUSING INSECURITY
HOME SAFETY: S CHECKED AND THAT THERE WASN'T A LEAK. PATIENT UNSURE WHETHER CHECKED BY A PROFESSIONAL. LANDLORD'S HOUSEHOLD CURRENTLY HAS COVID TOO. FLOORING IS UNEVEN WITH DIFFERENT PIECES OF CUT RUG IN PLACES POSING A TRIP HAZARD. PT HAS A SHOWER STOOL BUT IT W

## 2020-11-24 SDOH — ECONOMIC STABILITY: HOUSING INSECURITY
HOME SAFETY: PATIENT HAS HOMECARE BINDER WITH NEEDED PHONE NUMBERS INCLUDING THERAPISTS CONTACT INFO. PT'S GAS STOVE WAS PARTIALLY DISMANTLED WHILE OT WAS THERE. PATIENT STATES THAT NIECE WAS CLEANING IT. FAINT GAS SMELL IN STOVE AREA. NURSING WAS TOLD THAT IT WA

## 2020-11-24 SDOH — ECONOMIC STABILITY: HOUSING INSECURITY
HOME SAFETY: PATIENT WEARING MASK AND THERAPIST IN FULL PPE THROUGHOUT VISIT.  O2 TUBING TRACES BACK TO SOURCE AND VERIFIED FLOW RATE OF 1.5L.  NO KINKS OR TANGLES NOTED IN TUBING.  REVIEWED OXYGEN SAFETY WITH PATIENT - VERBALIZED UNDERSTANDING

## 2020-11-24 SDOH — ECONOMIC STABILITY: HOUSING INSECURITY
HOME SAFETY: AS UNSTABLE AND DID NOT HAVE RUBBER GRIPS FOR THE FEET. ADVISED AGAINST USING SHOWER STOOL. PT TO ASK BROTHER IN LAW TO PICK HER UP A NEW SHOWER CHAIR WITH BACK ON IT. PT WITH O2 AND LONG O2 CORDING STRETCHING DOWN THE HALLWAY AND POSING A TRIP HAZAR

## 2020-11-24 ASSESSMENT — ENCOUNTER SYMPTOMS
SEVERE DYSPNEA: 1
SEVERE DYSPNEA: 1
MUSCLE WEAKNESS: 1

## 2020-11-24 ASSESSMENT — ACTIVITIES OF DAILY LIVING (ADL)
BATHING_CURRENT_FUNCTION: MINIMUM ASSIST
TELEPHONE USE ASSESSED: 1
USING THE TELPHONE: INDEPENDENT
LIGHT HOUSEKEEPING: NEEDS ASSISTANCE
CURRENT_FUNCTION: STAND BY ASSIST
AMBULATION ASSISTANCE: 1
TOILETING: 1
HOUSEKEEPING ASSESSED: 1
DRESSING_LB_CURRENT_FUNCTION: INDEPENDENT
AMBULATION ASSISTANCE ON FLAT SURFACES: 1
AMBULATION ASSISTANCE: 1
ORAL_CARE_ASSESSED: 1
PHYSICAL TRANSFERS ASSESSED: 1
LAUNDRY: NEEDS ASSISTANCE
ORAL_CARE_CURRENT_FUNCTION: INDEPENDENT
TRANSPORTATION: DEPENDENT
PREPARING MEALS: NEEDS ASSISTANCE
GROOMING_CURRENT_FUNCTION: INDEPENDENT
SHOPPING ASSESSED: 1
FEEDING ASSESSED: 1
TOILETING: INDEPENDENT
TRANSPORTATION ASSESSED: 1
SHOPPING: DEPENDENT
FEEDING: INDEPENDENT
AMBULATION ASSISTANCE: STAND BY ASSIST
DRESSING_UB_CURRENT_FUNCTION: INDEPENDENT
GROOMING ASSESSED: 1
AMBULATION_DISTANCE/DURATION_TOLERATED: 30'
LAUNDRY ASSESSED: 1
AMBULATION ASSISTANCE: INDEPENDENT
BATHING ASSESSED: 1
PHYSICAL_TRANSFERS_DEVICES: FWW

## 2020-11-25 ENCOUNTER — HOME CARE VISIT (OUTPATIENT)
Dept: HOME HEALTH SERVICES | Facility: HOME HEALTHCARE | Age: 80
End: 2020-11-25
Payer: MEDICARE

## 2020-11-25 VITALS
TEMPERATURE: 98 F | RESPIRATION RATE: 18 BRPM | SYSTOLIC BLOOD PRESSURE: 95 MMHG | HEART RATE: 68 BPM | OXYGEN SATURATION: 95 % | DIASTOLIC BLOOD PRESSURE: 60 MMHG

## 2020-11-25 PROCEDURE — G0156 HHCP-SVS OF AIDE,EA 15 MIN: HCPCS

## 2020-11-25 ASSESSMENT — ENCOUNTER SYMPTOMS
VOMITING: DENIES
NAUSEA: DENIES
DEBILITATING PAIN: 1

## 2020-11-26 VITALS
OXYGEN SATURATION: 94 % | SYSTOLIC BLOOD PRESSURE: 92 MMHG | HEART RATE: 61 BPM | TEMPERATURE: 98.2 F | RESPIRATION RATE: 18 BRPM | DIASTOLIC BLOOD PRESSURE: 60 MMHG

## 2020-11-27 ENCOUNTER — HOME CARE VISIT (OUTPATIENT)
Dept: HOME HEALTH SERVICES | Facility: HOME HEALTHCARE | Age: 80
End: 2020-11-27
Payer: MEDICARE

## 2020-11-27 VITALS
RESPIRATION RATE: 18 BRPM | TEMPERATURE: 97 F | DIASTOLIC BLOOD PRESSURE: 68 MMHG | SYSTOLIC BLOOD PRESSURE: 101 MMHG | HEART RATE: 69 BPM | OXYGEN SATURATION: 97 %

## 2020-11-27 PROCEDURE — G0493 RN CARE EA 15 MIN HH/HOSPICE: HCPCS

## 2020-11-27 ASSESSMENT — ENCOUNTER SYMPTOMS
VOMITING: DENIES
SEVERE DYSPNEA: 1
NAUSEA: DENIES

## 2020-11-30 ENCOUNTER — HOME CARE VISIT (OUTPATIENT)
Dept: HOME HEALTH SERVICES | Facility: HOME HEALTHCARE | Age: 80
End: 2020-11-30
Payer: MEDICARE

## 2020-11-30 VITALS
SYSTOLIC BLOOD PRESSURE: 96 MMHG | HEART RATE: 64 BPM | RESPIRATION RATE: 18 BRPM | DIASTOLIC BLOOD PRESSURE: 54 MMHG | TEMPERATURE: 97.4 F | OXYGEN SATURATION: 95 %

## 2020-11-30 PROCEDURE — G0152 HHCP-SERV OF OT,EA 15 MIN: HCPCS

## 2020-12-01 ENCOUNTER — HOME CARE VISIT (OUTPATIENT)
Dept: HOME HEALTH SERVICES | Facility: HOME HEALTHCARE | Age: 80
End: 2020-12-01
Payer: MEDICARE

## 2020-12-01 VITALS
SYSTOLIC BLOOD PRESSURE: 98 MMHG | HEART RATE: 66 BPM | DIASTOLIC BLOOD PRESSURE: 55 MMHG | TEMPERATURE: 97.5 F | OXYGEN SATURATION: 99 % | RESPIRATION RATE: 18 BRPM

## 2020-12-01 VITALS
SYSTOLIC BLOOD PRESSURE: 98 MMHG | DIASTOLIC BLOOD PRESSURE: 65 MMHG | RESPIRATION RATE: 18 BRPM | TEMPERATURE: 98.9 F | HEART RATE: 64 BPM | OXYGEN SATURATION: 95 %

## 2020-12-01 VITALS
RESPIRATION RATE: 18 BRPM | HEART RATE: 79 BPM | DIASTOLIC BLOOD PRESSURE: 60 MMHG | SYSTOLIC BLOOD PRESSURE: 98 MMHG | OXYGEN SATURATION: 94 % | TEMPERATURE: 98.3 F

## 2020-12-01 PROCEDURE — G0156 HHCP-SVS OF AIDE,EA 15 MIN: HCPCS

## 2020-12-01 PROCEDURE — G0493 RN CARE EA 15 MIN HH/HOSPICE: HCPCS

## 2020-12-01 PROCEDURE — G0151 HHCP-SERV OF PT,EA 15 MIN: HCPCS

## 2020-12-01 SDOH — ECONOMIC STABILITY: HOUSING INSECURITY

## 2020-12-01 SDOH — ECONOMIC STABILITY: HOUSING INSECURITY
HOME SAFETY: IN HOME.  PT ASKED PATIENT ABOUT IT AND SHE STATED THAT SOMEONE HAD CHECKED IT OUT AND AS LONG AS DID USE SOMETHING THAT EVERYTHING WAS FINE.  PATIENT WAS UNCLEAR ON DETAILS.  PATIENT ALSO TATES THAT REPAIR PERSON WAS THERE EARLIER TODAY TO PUT IN A

## 2020-12-01 SDOH — ECONOMIC STABILITY: HOUSING INSECURITY
HOME SAFETY: PATIENT IS ON CONTINUOUS SUPPLEMENTAL O2.  PT TRACED O2 TUBING AND NOTED NO LEAKS OR PROBLEMS.  O2 CONCENTRATOR IS ON 1.5 LITERS PER MINUTE.  PATIENT HAS GOOD SUPPLY OF PORTABLE TANKS AND ARE STORED PROPERLY LYING DOWN. PT NOTED A FAINT SMELL OF GAS

## 2020-12-01 SDOH — ECONOMIC STABILITY: HOUSING INSECURITY: HOME SAFETY: PT'S HEATING CURRENTLY BEING REPAIRED AT TIME OF ASSESSMENT.

## 2020-12-01 SDOH — ECONOMIC STABILITY: HOUSING INSECURITY: HOME SAFETY: PHYSICIAN REGARDING HER FOLLOW UP AND CALLING OF NV ENERGY.

## 2020-12-01 SDOH — ECONOMIC STABILITY: HOUSING INSECURITY
HOME SAFETY: NEW MOTOR FOR HEATER.  PATIENT ASKED PT TO CHECK TO SEE IF HEAT WAS COMING OUT.  PT NOTED THAT IT WAS.  PT OBSERVED KNOBS ON GAS STOVE WERE REMOVED.  PT EDUCATED PATIENT ON NOT USING GAS STOVE WITH USE OF SUPPLEMENTAL O2.  PATIENT ABLE TO TEACHBACK V

## 2020-12-01 ASSESSMENT — ENCOUNTER SYMPTOMS
VOMITING: DENIES
NAUSEA: DENIES
SEVERE DYSPNEA: 1

## 2020-12-01 ASSESSMENT — ACTIVITIES OF DAILY LIVING (ADL): TRANSPORTATION COMMENTS: REQUIRES ASSIST OF ANOTHER PERSON OUT OF HOME ON UNEVEN SURFACES AND STEPS

## 2020-12-02 ENCOUNTER — HOME CARE VISIT (OUTPATIENT)
Dept: HOME HEALTH SERVICES | Facility: HOME HEALTHCARE | Age: 80
End: 2020-12-02
Payer: MEDICARE

## 2020-12-02 VITALS
SYSTOLIC BLOOD PRESSURE: 109 MMHG | DIASTOLIC BLOOD PRESSURE: 46 MMHG | TEMPERATURE: 97.5 F | OXYGEN SATURATION: 93 % | HEART RATE: 64 BPM | RESPIRATION RATE: 18 BRPM

## 2020-12-02 PROCEDURE — G0152 HHCP-SERV OF OT,EA 15 MIN: HCPCS

## 2020-12-02 NOTE — PROGRESS NOTES
TOMAS, patient is on continuous supplemental O2.  PT traced O2 tubing and noted no leaks or problems.  O2 concentrator is on 1.5 liters per minute.  Patient has good supply of portable tanks and are stored properly lying down. PT did note a faint smell of gas in home.  PT asked patient about it and she stated that someone had checked it out and as long as did use something that everything was fine.  Patient was unclear on details.  Patient also states that repair person was there earlier today to put in a new motor for heater.  Patient asked PT to check to see if heat was coming out.  PT noted that it was.  PT observed knobs on gas stove were removed.  PT educated patient on not using gas stove with use of supplemental O2.  Patient able to teachback verbal understanding.  PT notified ALLI Olivo supervisor regarding smell of gas in home, O2 in use and what PT observed and discussed with patient.  Pallavi states she will call patient to follow up on concern.  Pallavi sent case communication to team and physician regarding her follow up and calling of [employer].

## 2020-12-03 ENCOUNTER — HOME CARE VISIT (OUTPATIENT)
Dept: HOME HEALTH SERVICES | Facility: HOME HEALTHCARE | Age: 80
End: 2020-12-03
Payer: MEDICARE

## 2020-12-03 VITALS
OXYGEN SATURATION: 92 % | TEMPERATURE: 97.7 F | RESPIRATION RATE: 18 BRPM | DIASTOLIC BLOOD PRESSURE: 52 MMHG | SYSTOLIC BLOOD PRESSURE: 102 MMHG | HEART RATE: 85 BPM

## 2020-12-03 PROCEDURE — G0151 HHCP-SERV OF PT,EA 15 MIN: HCPCS

## 2020-12-03 PROCEDURE — 99999 PR NO CHARGE: CPT | Performed by: FAMILY MEDICINE

## 2020-12-03 SDOH — ECONOMIC STABILITY: HOUSING INSECURITY
HOME SAFETY: PATIENT IS ON CONTINUOUS SUPPLEMENTAL O2.  PT TRACED O2 TUBING AND NOTED NO LEAKS OR PROBLEMS.  O2 CONCENTRATOR IS ON 1.5 LITERS PER MINUTE.  PATIENT HAS GOOD SUPPLY OF PORTABLE TANKS AND ARE STORED PROPERLY.

## 2020-12-03 ASSESSMENT — ACTIVITIES OF DAILY LIVING (ADL): TRANSPORTATION COMMENTS: REQUIRES ASSIST OF ANOTHER PERSON AND AD OUT OF HOME

## 2020-12-04 ENCOUNTER — HOME CARE VISIT (OUTPATIENT)
Dept: HOME HEALTH SERVICES | Facility: HOME HEALTHCARE | Age: 80
End: 2020-12-04
Payer: MEDICARE

## 2020-12-04 VITALS
SYSTOLIC BLOOD PRESSURE: 94 MMHG | DIASTOLIC BLOOD PRESSURE: 57 MMHG | HEART RATE: 75 BPM | TEMPERATURE: 99.5 F | RESPIRATION RATE: 16 BRPM | OXYGEN SATURATION: 98 %

## 2020-12-04 PROCEDURE — G0299 HHS/HOSPICE OF RN EA 15 MIN: HCPCS

## 2020-12-04 ASSESSMENT — ENCOUNTER SYMPTOMS
NAUSEA: DENIES
SEVERE DYSPNEA: 1
VOMITING: DENIES

## 2020-12-07 ENCOUNTER — HOME CARE VISIT (OUTPATIENT)
Dept: HOME HEALTH SERVICES | Facility: HOME HEALTHCARE | Age: 80
End: 2020-12-07
Payer: MEDICARE

## 2020-12-07 VITALS
HEART RATE: 69 BPM | OXYGEN SATURATION: 96 % | SYSTOLIC BLOOD PRESSURE: 103 MMHG | DIASTOLIC BLOOD PRESSURE: 69 MMHG | TEMPERATURE: 98.7 F | RESPIRATION RATE: 16 BRPM

## 2020-12-07 VITALS
DIASTOLIC BLOOD PRESSURE: 69 MMHG | HEART RATE: 69 BPM | RESPIRATION RATE: 16 BRPM | SYSTOLIC BLOOD PRESSURE: 103 MMHG | TEMPERATURE: 98.7 F | OXYGEN SATURATION: 96 %

## 2020-12-07 PROCEDURE — G0156 HHCP-SVS OF AIDE,EA 15 MIN: HCPCS

## 2020-12-07 PROCEDURE — G0152 HHCP-SERV OF OT,EA 15 MIN: HCPCS

## 2020-12-08 ENCOUNTER — HOME CARE VISIT (OUTPATIENT)
Dept: HOME HEALTH SERVICES | Facility: HOME HEALTHCARE | Age: 80
End: 2020-12-08
Payer: MEDICARE

## 2020-12-08 VITALS
TEMPERATURE: 97.6 F | OXYGEN SATURATION: 90 % | HEART RATE: 83 BPM | DIASTOLIC BLOOD PRESSURE: 52 MMHG | SYSTOLIC BLOOD PRESSURE: 102 MMHG | RESPIRATION RATE: 16 BRPM

## 2020-12-08 PROCEDURE — G0151 HHCP-SERV OF PT,EA 15 MIN: HCPCS

## 2020-12-08 SDOH — ECONOMIC STABILITY: HOUSING INSECURITY
HOME SAFETY: PATIENT IS ON CONTINUOUS SUPPLEMENTAL O2.  PT TRACED O2 TUBING AND NOTED NO LEAKS OR PROBLEMS.  O2 CONCENTRATOR IS ON 1.5 LITERS PER MINUTE.  PATIENT HAS GOOD SUPPLY OF PORTABLE TANKS AND ARE STORED PROPERLY

## 2020-12-09 ENCOUNTER — HOME CARE VISIT (OUTPATIENT)
Dept: HOME HEALTH SERVICES | Facility: HOME HEALTHCARE | Age: 80
End: 2020-12-09
Payer: MEDICARE

## 2020-12-09 VITALS
HEART RATE: 80 BPM | TEMPERATURE: 99.1 F | DIASTOLIC BLOOD PRESSURE: 59 MMHG | OXYGEN SATURATION: 96 % | SYSTOLIC BLOOD PRESSURE: 91 MMHG

## 2020-12-09 PROCEDURE — G0152 HHCP-SERV OF OT,EA 15 MIN: HCPCS

## 2020-12-09 SDOH — ECONOMIC STABILITY: HOUSING INSECURITY: EVIDENCE OF SMOKING MATERIAL: 0

## 2020-12-10 ENCOUNTER — HOME CARE VISIT (OUTPATIENT)
Dept: HOME HEALTH SERVICES | Facility: HOME HEALTHCARE | Age: 80
End: 2020-12-10
Payer: MEDICARE

## 2020-12-10 VITALS
OXYGEN SATURATION: 92 % | TEMPERATURE: 97.9 F | SYSTOLIC BLOOD PRESSURE: 91 MMHG | RESPIRATION RATE: 17 BRPM | DIASTOLIC BLOOD PRESSURE: 55 MMHG | HEART RATE: 71 BPM

## 2020-12-10 PROCEDURE — G0151 HHCP-SERV OF PT,EA 15 MIN: HCPCS

## 2020-12-10 ASSESSMENT — ACTIVITIES OF DAILY LIVING (ADL): TRANSPORTATION COMMENTS: REQUIRES ASSIST OF ANOTHER PERSON AND AD OUT OF HOME ON UNEVEN SURFACES AND STEPS

## 2020-12-11 ENCOUNTER — HOME CARE VISIT (OUTPATIENT)
Dept: HOME HEALTH SERVICES | Facility: HOME HEALTHCARE | Age: 80
End: 2020-12-11
Payer: MEDICARE

## 2020-12-11 VITALS
RESPIRATION RATE: 16 BRPM | DIASTOLIC BLOOD PRESSURE: 72 MMHG | HEART RATE: 73 BPM | TEMPERATURE: 98 F | SYSTOLIC BLOOD PRESSURE: 104 MMHG | OXYGEN SATURATION: 96 %

## 2020-12-11 PROCEDURE — G0156 HHCP-SVS OF AIDE,EA 15 MIN: HCPCS

## 2020-12-11 PROCEDURE — G0493 RN CARE EA 15 MIN HH/HOSPICE: HCPCS

## 2020-12-11 ASSESSMENT — ENCOUNTER SYMPTOMS
NAUSEA: DENIES
VOMITING: DENIES
SEVERE DYSPNEA: 1

## 2020-12-11 NOTE — PROGRESS NOTES
gabriel, at PT visit on 12/10/20, patient's BP was 91/55.  Other vitals within parameters.  Patient denies any dizziness or light headedness. States she has normal stools.  Patient educated to call agency if any changes are noted.  Patient able to speakback verbal understanding.

## 2020-12-14 ENCOUNTER — HOME CARE VISIT (OUTPATIENT)
Dept: HOME HEALTH SERVICES | Facility: HOME HEALTHCARE | Age: 80
End: 2020-12-14
Payer: MEDICARE

## 2020-12-14 ENCOUNTER — TELEPHONE (OUTPATIENT)
Dept: MEDICAL GROUP | Facility: PHYSICIAN GROUP | Age: 80
End: 2020-12-14

## 2020-12-14 ENCOUNTER — APPOINTMENT (OUTPATIENT)
Dept: RADIOLOGY | Facility: MEDICAL CENTER | Age: 80
End: 2020-12-14
Attending: EMERGENCY MEDICINE
Payer: MEDICARE

## 2020-12-14 ENCOUNTER — HOSPITAL ENCOUNTER (EMERGENCY)
Facility: MEDICAL CENTER | Age: 80
End: 2020-12-14
Attending: EMERGENCY MEDICINE
Payer: MEDICARE

## 2020-12-14 VITALS
SYSTOLIC BLOOD PRESSURE: 107 MMHG | RESPIRATION RATE: 18 BRPM | OXYGEN SATURATION: 93 % | TEMPERATURE: 98.7 F | HEART RATE: 86 BPM | DIASTOLIC BLOOD PRESSURE: 68 MMHG

## 2020-12-14 VITALS
HEART RATE: 90 BPM | OXYGEN SATURATION: 99 % | BODY MASS INDEX: 43.39 KG/M2 | HEIGHT: 60 IN | DIASTOLIC BLOOD PRESSURE: 58 MMHG | WEIGHT: 221 LBS | SYSTOLIC BLOOD PRESSURE: 111 MMHG | RESPIRATION RATE: 17 BRPM | TEMPERATURE: 98 F

## 2020-12-14 DIAGNOSIS — R09.02 HYPOXIA: ICD-10-CM

## 2020-12-14 LAB
ALBUMIN SERPL BCP-MCNC: 3.2 G/DL (ref 3.2–4.9)
ALBUMIN/GLOB SERPL: 1.1 G/DL
ALP SERPL-CCNC: 87 U/L (ref 30–99)
ALT SERPL-CCNC: 13 U/L (ref 2–50)
ANION GAP SERPL CALC-SCNC: 12 MMOL/L (ref 7–16)
APPEARANCE UR: CLEAR
AST SERPL-CCNC: 15 U/L (ref 12–45)
BACTERIA #/AREA URNS HPF: NEGATIVE /HPF
BASOPHILS # BLD AUTO: 0.6 % (ref 0–1.8)
BASOPHILS # BLD: 0.04 K/UL (ref 0–0.12)
BILIRUB SERPL-MCNC: 0.5 MG/DL (ref 0.1–1.5)
BILIRUB UR QL STRIP.AUTO: NEGATIVE
BUN SERPL-MCNC: 14 MG/DL (ref 8–22)
CALCIUM SERPL-MCNC: 9 MG/DL (ref 8.5–10.5)
CHLORIDE SERPL-SCNC: 100 MMOL/L (ref 96–112)
CO2 SERPL-SCNC: 20 MMOL/L (ref 20–33)
COLOR UR: YELLOW
COVID ORDER STATUS COVID19: NORMAL
CREAT SERPL-MCNC: 0.97 MG/DL (ref 0.5–1.4)
EKG IMPRESSION: NORMAL
EOSINOPHIL # BLD AUTO: 0.26 K/UL (ref 0–0.51)
EOSINOPHIL NFR BLD: 3.7 % (ref 0–6.9)
EPI CELLS #/AREA URNS HPF: NORMAL /HPF
ERYTHROCYTE [DISTWIDTH] IN BLOOD BY AUTOMATED COUNT: 47 FL (ref 35.9–50)
FLUAV RNA SPEC QL NAA+PROBE: NEGATIVE
FLUBV RNA SPEC QL NAA+PROBE: NEGATIVE
GLOBULIN SER CALC-MCNC: 2.8 G/DL (ref 1.9–3.5)
GLUCOSE SERPL-MCNC: 135 MG/DL (ref 65–99)
GLUCOSE UR STRIP.AUTO-MCNC: NEGATIVE MG/DL
HCT VFR BLD AUTO: 33.9 % (ref 37–47)
HGB BLD-MCNC: 11 G/DL (ref 12–16)
HYALINE CASTS #/AREA URNS LPF: NORMAL /LPF
IMM GRANULOCYTES # BLD AUTO: 0.03 K/UL (ref 0–0.11)
IMM GRANULOCYTES NFR BLD AUTO: 0.4 % (ref 0–0.9)
KETONES UR STRIP.AUTO-MCNC: NEGATIVE MG/DL
LACTATE BLD-SCNC: 1.2 MMOL/L (ref 0.5–2)
LACTATE BLD-SCNC: 2.2 MMOL/L (ref 0.5–2)
LEUKOCYTE ESTERASE UR QL STRIP.AUTO: ABNORMAL
LYMPHOCYTES # BLD AUTO: 1.29 K/UL (ref 1–4.8)
LYMPHOCYTES NFR BLD: 18.4 % (ref 22–41)
MCH RBC QN AUTO: 29.8 PG (ref 27–33)
MCHC RBC AUTO-ENTMCNC: 32.4 G/DL (ref 33.6–35)
MCV RBC AUTO: 91.9 FL (ref 81.4–97.8)
MICRO URNS: ABNORMAL
MONOCYTES # BLD AUTO: 1.41 K/UL (ref 0–0.85)
MONOCYTES NFR BLD AUTO: 20.1 % (ref 0–13.4)
NEUTROPHILS # BLD AUTO: 3.98 K/UL (ref 2–7.15)
NEUTROPHILS NFR BLD: 56.8 % (ref 44–72)
NITRITE UR QL STRIP.AUTO: NEGATIVE
NRBC # BLD AUTO: 0 K/UL
NRBC BLD-RTO: 0 /100 WBC
PH UR STRIP.AUTO: 6.5 [PH] (ref 5–8)
PLATELET # BLD AUTO: 293 K/UL (ref 164–446)
PMV BLD AUTO: 9.1 FL (ref 9–12.9)
POTASSIUM SERPL-SCNC: 4.1 MMOL/L (ref 3.6–5.5)
PROT SERPL-MCNC: 6 G/DL (ref 6–8.2)
PROT UR QL STRIP: NEGATIVE MG/DL
RBC # BLD AUTO: 3.69 M/UL (ref 4.2–5.4)
RBC # URNS HPF: NORMAL /HPF
RBC UR QL AUTO: NEGATIVE
RSV RNA SPEC QL NAA+PROBE: NEGATIVE
SARS-COV-2 RNA RESP QL NAA+PROBE: DETECTED
SODIUM SERPL-SCNC: 132 MMOL/L (ref 135–145)
SP GR UR STRIP.AUTO: 1.01
SPECIMEN SOURCE: ABNORMAL
TRANS CELLS #/AREA URNS HPF: NORMAL /HPF
TROPONIN T SERPL-MCNC: 15 NG/L (ref 6–19)
UROBILINOGEN UR STRIP.AUTO-MCNC: 0.2 MG/DL
WBC # BLD AUTO: 7 K/UL (ref 4.8–10.8)
WBC #/AREA URNS HPF: NORMAL /HPF

## 2020-12-14 PROCEDURE — 80053 COMPREHEN METABOLIC PANEL: CPT

## 2020-12-14 PROCEDURE — 0241U HCHG SARS-COV-2 COVID-19 NFCT DS RESP RNA 4 TRGT MIC: CPT

## 2020-12-14 PROCEDURE — G0152 HHCP-SERV OF OT,EA 15 MIN: HCPCS

## 2020-12-14 PROCEDURE — 83605 ASSAY OF LACTIC ACID: CPT | Mod: 91

## 2020-12-14 PROCEDURE — 84484 ASSAY OF TROPONIN QUANT: CPT

## 2020-12-14 PROCEDURE — 99285 EMERGENCY DEPT VISIT HI MDM: CPT

## 2020-12-14 PROCEDURE — 85025 COMPLETE CBC W/AUTO DIFF WBC: CPT

## 2020-12-14 PROCEDURE — 93005 ELECTROCARDIOGRAM TRACING: CPT | Performed by: EMERGENCY MEDICINE

## 2020-12-14 PROCEDURE — 87040 BLOOD CULTURE FOR BACTERIA: CPT

## 2020-12-14 PROCEDURE — 71045 X-RAY EXAM CHEST 1 VIEW: CPT

## 2020-12-14 PROCEDURE — C9803 HOPD COVID-19 SPEC COLLECT: HCPCS | Performed by: EMERGENCY MEDICINE

## 2020-12-14 PROCEDURE — 87086 URINE CULTURE/COLONY COUNT: CPT

## 2020-12-14 PROCEDURE — 81001 URINALYSIS AUTO W/SCOPE: CPT

## 2020-12-14 ASSESSMENT — FIBROSIS 4 INDEX: FIB4 SCORE: 1.99

## 2020-12-14 NOTE — ED NOTES
Bonnie from Lab called with critical result of positive covid at 1441. Critical lab result read back to Bonnie.   Dr. Alvarado notified of critical lab result at 1441.  Critical lab result read back by Dr. Alvarado.

## 2020-12-14 NOTE — ED NOTES
Pt onto bedside commode. Pt urine collected and sent to lab.    Breath Sounds equal & clear to percussion & auscultation, no accessory muscle use

## 2020-12-14 NOTE — ED TRIAGE NOTES
Jammie Berry comes to the ED via EMS from home where she was getting PT.  Patient had hypotension and low O2 during her PT, called her PCP and was sent to the ED.  Medics had a arrival BP of 112/74 but her O2 on RA was in the high 80s, placed on 1.5 L via NC and O2 is 98%

## 2020-12-14 NOTE — DISCHARGE INSTRUCTIONS
It seems her oxygen level goes down when you are active.  You are up and active which her oxygen to 2 L or 2-1/2 L and continue monitoring your oxygen levels.  Return if your oxygen levels stay low during rest with oxygen, or if anything changes or worsens.

## 2020-12-14 NOTE — ED PROVIDER NOTES
ED Provider  Scribed for Jayjay Alvarado D.O. by Dennis Mcgrath. 2020  12:56 PM    Means of arrival:Ambulance  History obtained from:Patient  History limited by: None    CHIEF COMPLAINT  Chief Complaint   Patient presents with   • Shortness of Breath   • Hypotension     HPI  Jammie Berry is a 80 y.o. female who presents for evaluation of mild hypoxia onset prior to arrival. She reports she was completing physical therapy at her home today, when her oxygen saturation dropped to 88% while she was taking a walk. She uses 1.5 L supplementary home oxygen, which she was using while she was waking today. Per EMS, the patient was hypotensive at 112/74 and oxygen saturation was in the high 80's upon arrival. She says her blood pressure gotten down into the 90's/40's. She admits to additional symptoms of generalized weakness with hypoxic episodes, but denies dizziness, light headedness, chest pain, shortness of breath, fever, chills, or diarrhea. She denies alleviating factors. She takes Telmisartan for hypertension.     PPE Note: I personally donned full PPE for all patient encounters during this visit, including being clean-shaven with an N95 respirator mask, gloves, gown, and goggles.     Scribe remained outside the patient's room and did not have any contact with the patient for the duration of patient encounter.      REVIEW OF SYSTEMS  See HPI for further details. All other systems are negative.     PAST MEDICAL HISTORY   has a past medical history of Abnormal Pap smear of cervix, CKD (chronic kidney disease), stage III (2015), Essential hypertension (2015), Other specified hypothyroidism (9/3/2015), and Tuberculosis.    SOCIAL HISTORY  Social History     Tobacco Use   • Smoking status: Former Smoker     Packs/day: 0.25     Years: 12.00     Pack years: 3.00     Types: Cigarettes     Quit date: 1977     Years since quittin.9   • Smokeless tobacco: Never Used   • Tobacco comment: Started  smoking at age 25 (social smoker)   Substance and Sexual Activity   • Alcohol use: No     Alcohol/week: 0.0 oz   • Drug use: No   • Sexual activity: Never     Partners: Male     Comment: vaginal atrophy-dysparunia       SURGICAL HISTORY   has a past surgical history that includes tonsillectomy.    CURRENT MEDICATIONS  Home Medications     Reviewed by Abhishek Lizama R.N. (Registered Nurse) on 12/14/20 at 1249  Med List Status: Complete   Medication Last Dose Status   acetaminophen (TYLENOL) 325 MG Tab  Active   atenolol (TENORMIN) 25 MG Tab  Active   benzocaine-menthol (CEPACOL) 15-3.6 MG Lozenge  Active   benzonatate (TESSALON) 100 MG Cap  Active   Cholecalciferol (VITAMIN D-3) 25 MCG (1000 UT) Cap  Active   cyanocobalamin (VITAMIN B-12) 500 MCG Tab  Active   guaiFENesin ER (MUCINEX) 600 MG TABLET SR 12 HR  Active   Home Care Oxygen  Active   levothyroxine (SYNTHROID) 50 MCG Tab  Active   telmisartan (MICARDIS) 40 MG Tab  Active   therapeutic multivitamin-minerals (THERAGRAN-M) TABS  Active              ALLERGIES  No Known Allergies    PHYSICAL EXAM  VITAL SIGNS: /59   Pulse 86   Temp 36.7 °C (98 °F) (Temporal)   Resp 20   Ht 1.524 m (5')   Wt 100.2 kg (221 lb)   SpO2 99%   BMI 43.16 kg/m²   Constitutional: Alert in no apparent distress.  HENT: No signs of trauma, mucous membranes are moist  Eyes: Conjunctiva normal, Non-icteric.   Neck: Normal range of motion, No tenderness, Supple.  Lymphatic: No lymphadenopathy noted.   Cardiovascular: Regular rate and rhythm, no murmurs.   Thorax & Lungs: Normal breath sounds, No respiratory distress, No wheezing, No chest tenderness.   Abdomen: Bowel sounds normal, Soft, No tenderness, No masses, No pulsatile masses. No peritoneal signs.  Skin: Warm, Dry, normal color.   Back: No bony tenderness, No CVA tenderness.   Extremities: No edema, No tenderness, No cyanosis  Musculoskeletal: Good range of motion in all major joints. No tenderness to palpation or major  deformities noted.   Neurologic: Alert and oriented x4, Normal motor function, Normal sensory function, No focal deficits noted.   Psychiatric: Affect normal, Judgment normal, Mood normal.     DIAGNOSTIC STUDIES / PROCEDURES    EKG  12 Lead EKG interpreted by me shown below.      LABS  Results for orders placed or performed during the hospital encounter of 12/14/20   CBC WITH DIFFERENTIAL   Result Value Ref Range    WBC 7.0 4.8 - 10.8 K/uL    RBC 3.69 (L) 4.20 - 5.40 M/uL    Hemoglobin 11.0 (L) 12.0 - 16.0 g/dL    Hematocrit 33.9 (L) 37.0 - 47.0 %    MCV 91.9 81.4 - 97.8 fL    MCH 29.8 27.0 - 33.0 pg    MCHC 32.4 (L) 33.6 - 35.0 g/dL    RDW 47.0 35.9 - 50.0 fL    Platelet Count 293 164 - 446 K/uL    MPV 9.1 9.0 - 12.9 fL    Neutrophils-Polys 56.80 44.00 - 72.00 %    Lymphocytes 18.40 (L) 22.00 - 41.00 %    Monocytes 20.10 (H) 0.00 - 13.40 %    Eosinophils 3.70 0.00 - 6.90 %    Basophils 0.60 0.00 - 1.80 %    Immature Granulocytes 0.40 0.00 - 0.90 %    Nucleated RBC 0.00 /100 WBC    Neutrophils (Absolute) 3.98 2.00 - 7.15 K/uL    Lymphs (Absolute) 1.29 1.00 - 4.80 K/uL    Monos (Absolute) 1.41 (H) 0.00 - 0.85 K/uL    Eos (Absolute) 0.26 0.00 - 0.51 K/uL    Baso (Absolute) 0.04 0.00 - 0.12 K/uL    Immature Granulocytes (abs) 0.03 0.00 - 0.11 K/uL    NRBC (Absolute) 0.00 K/uL   COMP METABOLIC PANEL   Result Value Ref Range    Sodium 132 (L) 135 - 145 mmol/L    Potassium 4.1 3.6 - 5.5 mmol/L    Chloride 100 96 - 112 mmol/L    Co2 20 20 - 33 mmol/L    Anion Gap 12.0 7.0 - 16.0    Glucose 135 (H) 65 - 99 mg/dL    Bun 14 8 - 22 mg/dL    Creatinine 0.97 0.50 - 1.40 mg/dL    Calcium 9.0 8.5 - 10.5 mg/dL    AST(SGOT) 15 12 - 45 U/L    ALT(SGPT) 13 2 - 50 U/L    Alkaline Phosphatase 87 30 - 99 U/L    Total Bilirubin 0.5 0.1 - 1.5 mg/dL    Albumin 3.2 3.2 - 4.9 g/dL    Total Protein 6.0 6.0 - 8.2 g/dL    Globulin 2.8 1.9 - 3.5 g/dL    A-G Ratio 1.1 g/dL   LACTIC ACID   Result Value Ref Range    Lactic Acid 2.2 (H) 0.5 - 2.0  mmol/L   LACTIC ACID   Result Value Ref Range    Lactic Acid 1.2 0.5 - 2.0 mmol/L   URINALYSIS    Specimen: Urine   Result Value Ref Range    Color Yellow     Character Clear     Specific Gravity 1.006 <1.035    Ph 6.5 5.0 - 8.0    Glucose Negative Negative mg/dL    Ketones Negative Negative mg/dL    Protein Negative Negative mg/dL    Bilirubin Negative Negative    Urobilinogen, Urine 0.2 Negative    Nitrite Negative Negative    Leukocyte Esterase Trace (A) Negative    Occult Blood Negative Negative    Micro Urine Req Microscopic    COVID/SARS CoV-2 PCR    Specimen: Nasopharyngeal; Respirate   Result Value Ref Range    COVID Order Status Received    TROPONIN   Result Value Ref Range    Troponin T 15 6 - 19 ng/L   CoV-2, Flu A/B, And RSV by PCR   Result Value Ref Range    Influenza virus A RNA Negative Negative    Influenza virus B, PCR Negative Negative    RSV, PCR Negative Negative    SARS-CoV-2 by PCR DETECTED (AA)     SARS-CoV-2 Source NP Swab    ESTIMATED GFR   Result Value Ref Range    GFR If African American >60 >60 mL/min/1.73 m 2    GFR If Non  56 (A) >60 mL/min/1.73 m 2   URINE MICROSCOPIC (W/UA)   Result Value Ref Range    WBC 0-2 /hpf    RBC 0-2 /hpf    Bacteria Negative None /hpf    Epithelial Cells Few /hpf    Trans Epithelial Cells Rare /hpf    Hyaline Cast 0-2 /lpf   EKG   Result Value Ref Range    Report       [facility] Emergency Dept.    Test Date:  2020  Pt Name:    KAVYA OSMAN              Department: ER  MRN:        [id number]                      Room:       [facility]  Gender:     Female                       Technician: [id number]  :        [id number]                   Requested By:ER TRIAGE PROTOCOL  Order #:    [id number]                    Jose Juan MD: CECILIA FARRIS D.O.    Measurements  Intervals                                Axis  Rate:       82                           P:          10  AK:         152                          QRS:         6  QRSD:       78                           T:          11  QT:         364  QTc:        425    Interpretive Statements  SINUS RHYTHM  ATRIAL PREMATURE COMPLEX  Compared to ECG 11/16/2020 09:02:07  Atrial premature complex(es) now present  T-wave abnormality no longer present  Electronically Signed On 12- 15:12:27 PST by CECILIA FARRIS D.O.        All labs reviewed by me.    RADIOLOGY  DX-CHEST-PORTABLE (1 VIEW)   Final Result      Patchy ill-defined bilateral pulmonary infiltrates. These findings are consistent with Covid 19 pneumonia.        The radiologist's interpretations of all radiological studies have been reviewed by me.    Films have been independently by me      COURSE  Pertinent Labs & Imaging studies reviewed. (See chart for details)    Review of past medical records shows the patient was admitted 11/16/20 with bilateral peripheral infiltrates. She was COVID positive at that time.     12:56 PM - Patient seen and examined at bedside. Ordered for DX-Chest, EKG, Lactic Acid now and every 2 hours, COVID/SARS CoV-2 PCR, CoV-2, Flu A/B and RSV PCR, UA, Urine Culture, Blood Culture x2, CBC with diff, CMP to evaluate her symptoms.     3:08 PM - I reevaluated the patient at bedside. I discussed the patient's diagnostic study results as noted above. Recommended the patient self-isolate per CDC guidelines for positive COVID test. I discussed plan for discharge and follow up as outlined below. The patient verbalizes they feel comfortable going home. The patient is stable for discharge at this time and will return for any new or worsening symptoms. Patient verbalizes understanding and support with my plan for discharge.      MEDICAL DECISION MAKING  This is a 80 y.o. female who presents with an episode of hypoxemia.  This patient has known history of coronavirus, and she has home therapy and to maintain pulmonary function and activity.  She does use oxygen 1-1/2 L/min and at to dose her oxygen levels are  in the mid to high 90s.    During the day today during physical therapy patient was put out for a walk and her sats went to 80%.  With rest this did resolve.    There is report of hypotension.  But the patient states that the blood pressures have been right around 100.  She has a history of hypertension is on blood pressure medications and there is been no documented incident of actual hypotension just low normal blood pressures.    Her chest x-ray does show inflammation consistent with coronavirus.  Is not significantly bad.  She has no increased work of breathing lungs have no bronchospasm.  Her hypotensive episode occurred with exertion.  With that I believe she does need to use increased dose of oxygen at home.  Explained to the patient to use her oxygen at 2 to 2-1/2 L when up and ambulating.  And return if her symptoms change or worsen.    The patient will return for new or worsening symptoms and is stable at the time of discharge.    DISPOSITION:  Patient will be discharged home in stable condition.    FOLLOW UP:  HERMINIA Iyer.R.N.  [address]  [address]  [place] [id number] [phone]    In 3 days      FINAL IMPRESSION  1. Hypoxia       Dennis SAMAYOA (Scribe), am scribing for, and in the presence of, Jayjay Alvarado D.O..    Electronically signed by: Dennis Mcgrath ([place]), 12/14/2020    Jayjay SAMAYOA D.O. personally performed the services described in this documentation, as scribed by Dennis Mcgrath in my presence, and it is both accurate and complete.    The note accurately reflects work and decisions made by me.  Jayjay Alvarado D.O.  12/14/2020  6:26 PM

## 2020-12-14 NOTE — PROGRESS NOTES
"MULUI from OT treatment 12/14/2020:    Pt with reports feeling \"off\" this morning. Low BP noted. 89/47 on left, 87/45 on right. Pt verbalized having experienced lower BP's over the weekend.  Call placed to nursing manager. Advised OT check pt's O2 saturation with ambulation. O2 saturation dropped to 82% on 1.5L/min with ambulation to bedroom, ~ 60 ft. Pt sat with recovery back into the 90's within 1-2 min. Returned to living room with O2 saturation again dropping to a low of 82%. Return call placed to nursing manager who called MD. Message left for MD. After a period of time with no return message, OT called MD office. MD offices directed patient to go to the ED for assessment. Pt refusing ambulance transport. Pt attempted to contact extended family for transport to ED however one refused and one did not answer. Nurse manager called patient and educated her in risks of not seeking futher medical assessment in ED. Pt then agreed to [facility] transport. 911 called. Report given to arriving EMS personnel upon arrival including med list, PMH, and recent medical history of hospitalizatin / covid infection. Pt requested door be left unlocked as OT and EMS were leaving. Care team was updated on patient status. Note sent to MD."

## 2020-12-14 NOTE — DISCHARGE PLANNING
Medical Social Work    LSW was notified that pt is medically clear to dc and requires assistance w/ getting home. Pt is covid + and on 1 liter of O2. Pt normally uses a walker but it is at home.     LSW contacted [facility] and spoke w/ Ibeth to arrange wheelchair transport for patient who is covid +. Ibeth was able to arrange transport and pick-up for pt between [phone]. LSW completed approved service form and faxed to [facility] fax# [phone].     Pt to dc home via [facility] transport between [phone]. Bedside RN notified.

## 2020-12-14 NOTE — TELEPHONE ENCOUNTER
Pallavi from [facility] called she states that the physical therapist was with patient and patient is Hypotensive BP is at 84/45 in one arm and 89/47 in the other. Oxygen is at 93 on 1.5 L  Oxygen when patient Ambulates 82% on 1.5 L nasal canula.  Respirations at 18   Pulse: 86 resting    They would like to know if you would like them to send her to the ER or send in an order to up her Oxygen     Pallavi's number:[phone]  Please advise

## 2020-12-15 ENCOUNTER — HOME CARE VISIT (OUTPATIENT)
Dept: HOME HEALTH SERVICES | Facility: HOME HEALTHCARE | Age: 80
End: 2020-12-15
Payer: MEDICARE

## 2020-12-15 VITALS
HEART RATE: 81 BPM | DIASTOLIC BLOOD PRESSURE: 60 MMHG | SYSTOLIC BLOOD PRESSURE: 101 MMHG | OXYGEN SATURATION: 98 % | TEMPERATURE: 98.8 F | RESPIRATION RATE: 17 BRPM

## 2020-12-15 PROCEDURE — G0493 RN CARE EA 15 MIN HH/HOSPICE: HCPCS

## 2020-12-15 PROCEDURE — G0151 HHCP-SERV OF PT,EA 15 MIN: HCPCS

## 2020-12-15 SDOH — ECONOMIC STABILITY: HOUSING INSECURITY: HOME SAFETY: E BACK ABILITY TO TURN O2 TO 2-2.5 LITERS FOR ACTIVITY.

## 2020-12-15 SDOH — ECONOMIC STABILITY: HOUSING INSECURITY: EVIDENCE OF SMOKING MATERIAL: 0

## 2020-12-15 SDOH — ECONOMIC STABILITY: HOUSING INSECURITY
HOME SAFETY: PATIENT IS ON CONTINUOUS SUPPLEMENTAL O2.  PT TRACED O2 TUBING AND NOTED NO LEAKS OR PROBLEMS.  O2 CONCENTRATOR IS ON 1.5 LITERS PER MINUTE.  PATIENT HAS GOOD SUPPLY OF PORTABLE TANKS AND ARE STORED PROPERLY.  PATIENT ABLE TO SPEAKBACK AND DEMONSTRAT

## 2020-12-15 ASSESSMENT — ENCOUNTER SYMPTOMS
MUSCLE WEAKNESS: 1
VOMITING: DENIES
NAUSEA: DENIES

## 2020-12-15 ASSESSMENT — ACTIVITIES OF DAILY LIVING (ADL): TRANSPORTATION COMMENTS: REQUIRES ASSIST OF ANOTHER PERSON AND AD OUT OF HOME ON UNEVEN SURFACES AND STEPS

## 2020-12-15 NOTE — ED NOTES
Patient walked in room for ambulating O2 sat.  On home 1L NC baseline of oxygen patient O2 remained >96%

## 2020-12-16 VITALS
TEMPERATURE: 98.8 F | RESPIRATION RATE: 17 BRPM | SYSTOLIC BLOOD PRESSURE: 101 MMHG | OXYGEN SATURATION: 97 % | HEART RATE: 81 BPM | DIASTOLIC BLOOD PRESSURE: 60 MMHG

## 2020-12-16 LAB
BACTERIA UR CULT: NORMAL
SIGNIFICANT IND 70042: NORMAL
SITE SITE: NORMAL
SOURCE SOURCE: NORMAL

## 2020-12-16 NOTE — PROGRESS NOTES
"History pertinent to today's visit: COVID-19, HTN, CKD 3. Skilled need: assessment, medication management/teaching, fall/ O2 safety education. Pt/Cg response to the services provided: Arrived at patient's home with PT working with her. VSS. Lungs are clear.  Patient states \" feeling much better and getting stronger \". Patient had ER visit yesterday 12/14/2020 for SOB and Hypotension. ER Discharge Summary and instruction includes to increase O2 to 2-2.5 li when up and exercising.  Patient states \" I am not too short of breath now and I don't feel so tired compare to yesterday \". Patient verbalized knowledge and shown this SN on how to set O2 flowrate at 1.5-2.5 li.    Patient relayed concern stating she has no transportation or anyone in her family to see Primary MD for follow up appointment. Patient does not have internet service and cellphone capability to do MY CHART virtual appointment either. Patient states \" because I had CoVid no one in my family is comfortable to take me to my MD's appointment and I don't want them to get it either \". This SN called MD to ask and see options for patient regarding follow up appointment, left message on MA's voicemail. Patient states her  is still in the hospital recovering from Covid and that he was downgraded from ICU to a regular floor so she was happy to hear about it. Offered patient to bathe/shower if she's able, patient states she will have it next visit or next week. No new medication or changes reported. Denies fall event. Discussed O2 safety and management. Educated on Safety and fall prevention. Reminded pt to call HH with questions and concerns and made aware HH is 24/7.  Next SN visit: To follow up appointment with Primary MD, Health Assessment and Teachings. Case Communication: CM.  "

## 2020-12-17 ENCOUNTER — HOME CARE VISIT (OUTPATIENT)
Dept: HOME HEALTH SERVICES | Facility: HOME HEALTHCARE | Age: 80
End: 2020-12-17
Payer: MEDICARE

## 2020-12-17 VITALS
OXYGEN SATURATION: 98 % | RESPIRATION RATE: 18 BRPM | SYSTOLIC BLOOD PRESSURE: 101 MMHG | DIASTOLIC BLOOD PRESSURE: 59 MMHG | HEART RATE: 77 BPM | TEMPERATURE: 98.8 F

## 2020-12-17 PROCEDURE — G0151 HHCP-SERV OF PT,EA 15 MIN: HCPCS

## 2020-12-17 ASSESSMENT — ACTIVITIES OF DAILY LIVING (ADL)
AMBULATION_DISTANCE/DURATION_TOLERATED: 100 FEET
TRANSPORTATION COMMENTS: REQUIRES ASSIST OF ANOTHER PERSON AND AD OUT OF HOME ON UNEVEN SURFACES
AMBULATION ASSISTANCE ON FLAT SURFACES: 1

## 2020-12-17 NOTE — PROGRESS NOTES
PT reassessment complete on 12/17/20.  Request authorization for continued PT 2 week 3 effective 12/20/20.

## 2020-12-18 ENCOUNTER — HOME CARE VISIT (OUTPATIENT)
Dept: HOME HEALTH SERVICES | Facility: HOME HEALTHCARE | Age: 80
End: 2020-12-18
Payer: MEDICARE

## 2020-12-18 PROCEDURE — G0156 HHCP-SVS OF AIDE,EA 15 MIN: HCPCS

## 2020-12-19 LAB
BACTERIA BLD CULT: NORMAL
BACTERIA BLD CULT: NORMAL
SIGNIFICANT IND 70042: NORMAL
SIGNIFICANT IND 70042: NORMAL
SITE SITE: NORMAL
SITE SITE: NORMAL
SOURCE SOURCE: NORMAL
SOURCE SOURCE: NORMAL

## 2020-12-21 ENCOUNTER — HOME CARE VISIT (OUTPATIENT)
Dept: HOME HEALTH SERVICES | Facility: HOME HEALTHCARE | Age: 80
End: 2020-12-21
Payer: MEDICARE

## 2020-12-21 ENCOUNTER — TELEPHONE (OUTPATIENT)
Dept: MEDICAL GROUP | Facility: PHYSICIAN GROUP | Age: 80
End: 2020-12-21

## 2020-12-21 VITALS
SYSTOLIC BLOOD PRESSURE: 114 MMHG | HEART RATE: 76 BPM | TEMPERATURE: 97.6 F | OXYGEN SATURATION: 97 % | RESPIRATION RATE: 18 BRPM | DIASTOLIC BLOOD PRESSURE: 69 MMHG

## 2020-12-21 PROCEDURE — G0152 HHCP-SERV OF OT,EA 15 MIN: HCPCS

## 2020-12-21 PROCEDURE — 665001 SOC-HOME HEALTH

## 2020-12-21 SDOH — ECONOMIC STABILITY: HOUSING INSECURITY: EVIDENCE OF SMOKING MATERIAL: 0

## 2020-12-21 SDOH — ECONOMIC STABILITY: HOUSING INSECURITY
HOME SAFETY: L TRIP HAZARD. O2 TUBING WAS TRACED AND FOUND TO BE INTACT, WITHOUT KINKS, AND APPROPRIATELY CONNECTED TO CONCENTRATOR.

## 2020-12-21 SDOH — ECONOMIC STABILITY: HOUSING INSECURITY
HOME SAFETY: DIFFERENT LEVELS OF RUGS ON FLOOR POSING A TRIP AREA. CARPET IS OLD AND WORN, NECESSITATING COVERAGE WITH CUP PIECES OF CARPET. PT RECENTLY GOT NEW STOVE INSTALLED. PRIMARILY USING MICROWAVE FOR MEAL PREP. PT WITH LONG O2 TUBING PRESENTING A POTENTIA

## 2020-12-21 ASSESSMENT — ACTIVITIES OF DAILY LIVING (ADL)
TRANSPORTATION: DEPENDENT
HOUSEKEEPING ASSESSED: 1
GROOMING ASSESSED: 1
SHOPPING: DEPENDENT
TOILETING: INDEPENDENT
DRESSING_LB_CURRENT_FUNCTION: INDEPENDENT
GROOMING_CURRENT_FUNCTION: INDEPENDENT
SHOPPING ASSESSED: 1
BATHING_CURRENT_FUNCTION: SUPERVISION
TRANSPORTATION ASSESSED: 1
LIGHT HOUSEKEEPING: NEEDS ASSISTANCE
FEEDING ASSESSED: 1
TOILETING: 1
LAUNDRY ASSESSED: 1
BATHING ASSESSED: 1
FEEDING: INDEPENDENT
LAUNDRY: NEEDS ASSISTANCE
PREPARING MEALS: NEEDS ASSISTANCE
DRESSING_UB_CURRENT_FUNCTION: INDEPENDENT

## 2020-12-21 NOTE — TELEPHONE ENCOUNTER
Fifi Olivo from [facility] O2 when she is ambulating drops down to 82 they bumped the O2 to 2.5 L when ambulating but it is still dropping.  Home health would like to know what they should do.    ( Just for me )   Ph:[phone]

## 2020-12-21 NOTE — PROGRESS NOTES
Pt seen for Occupational Therapy  treatment. Intermittent monitoring of O2 saturation throughout. Initally pt on 1.5 L/min O2. O2 saturation dropped to 80% post ambulation to bedroom and back to living room, ~ 60 ft. O2 turned up to 2.5 L/min. O2 sat returned to the 90's within 1-2 min with instruction on pursed lip breathing. Pt able to maintain O2 sats in the 90's with standing ex at chairside but required seated rest breaks after each set of 10 ex. Pt took 2 more walks to bedroom and back during the course of the session. O2 sats dropped to 82% and 84% respectively with patient intentionally not talking during last walk to better replicate conditions of her being home alone. Call placed to [facility] nursing manager. Call placed to MICHELLE Mclaughlin's office. Message left notifying them of low O2 with ambulation. Will need order if MD's office wants patient to increase O2 concentration to more than 2.5 L/min (the max limit for O2 as indicated on ED note from last week). O2 concentration left at 2.5 L/min at end of Occupational Therapy session.

## 2020-12-21 NOTE — Clinical Note
Occupational Therapy re-assessment completed 12/21/2020. Requesting authorization for 1w4 for continued skilled OT.

## 2020-12-22 ENCOUNTER — HOME CARE VISIT (OUTPATIENT)
Dept: HOME HEALTH SERVICES | Facility: HOME HEALTHCARE | Age: 80
End: 2020-12-22
Payer: MEDICARE

## 2020-12-22 VITALS
TEMPERATURE: 98.4 F | SYSTOLIC BLOOD PRESSURE: 128 MMHG | OXYGEN SATURATION: 96 % | RESPIRATION RATE: 18 BRPM | DIASTOLIC BLOOD PRESSURE: 79 MMHG | HEART RATE: 81 BPM

## 2020-12-22 PROCEDURE — G0493 RN CARE EA 15 MIN HH/HOSPICE: HCPCS

## 2020-12-22 ASSESSMENT — ENCOUNTER SYMPTOMS
NAUSEA: DENIES
SEVERE DYSPNEA: 1
VOMITING: DENIES

## 2020-12-23 ENCOUNTER — HOME CARE VISIT (OUTPATIENT)
Dept: HOME HEALTH SERVICES | Facility: HOME HEALTHCARE | Age: 80
End: 2020-12-23
Payer: MEDICARE

## 2020-12-23 VITALS
SYSTOLIC BLOOD PRESSURE: 115 MMHG | HEART RATE: 82 BPM | TEMPERATURE: 98.5 F | OXYGEN SATURATION: 99 % | DIASTOLIC BLOOD PRESSURE: 60 MMHG | RESPIRATION RATE: 18 BRPM

## 2020-12-23 PROCEDURE — G0156 HHCP-SVS OF AIDE,EA 15 MIN: HCPCS

## 2020-12-28 ENCOUNTER — HOME CARE VISIT (OUTPATIENT)
Dept: HOME HEALTH SERVICES | Facility: HOME HEALTHCARE | Age: 80
End: 2020-12-28
Payer: MEDICARE

## 2020-12-28 VITALS
OXYGEN SATURATION: 99 % | DIASTOLIC BLOOD PRESSURE: 87 MMHG | TEMPERATURE: 97.7 F | HEART RATE: 75 BPM | RESPIRATION RATE: 16 BRPM | SYSTOLIC BLOOD PRESSURE: 114 MMHG

## 2020-12-28 PROCEDURE — G0152 HHCP-SERV OF OT,EA 15 MIN: HCPCS

## 2020-12-28 PROCEDURE — G0156 HHCP-SVS OF AIDE,EA 15 MIN: HCPCS

## 2020-12-29 ENCOUNTER — HOME CARE VISIT (OUTPATIENT)
Dept: HOME HEALTH SERVICES | Facility: HOME HEALTHCARE | Age: 80
End: 2020-12-29
Payer: MEDICARE

## 2020-12-29 VITALS
SYSTOLIC BLOOD PRESSURE: 118 MMHG | HEART RATE: 74 BPM | OXYGEN SATURATION: 99 % | TEMPERATURE: 98 F | DIASTOLIC BLOOD PRESSURE: 70 MMHG | RESPIRATION RATE: 16 BRPM

## 2020-12-29 VITALS
DIASTOLIC BLOOD PRESSURE: 87 MMHG | TEMPERATURE: 97.9 F | RESPIRATION RATE: 16 BRPM | SYSTOLIC BLOOD PRESSURE: 114 MMHG | OXYGEN SATURATION: 99 % | HEART RATE: 75 BPM

## 2020-12-29 PROCEDURE — G0157 HHC PT ASSISTANT EA 15: HCPCS | Mod: CQ

## 2020-12-30 NOTE — PROGRESS NOTES
Jammie is reporting that she has been uping her O2 to 4L for act but at times forgets. Her orders read that her O2 can be between 1.5L to 4L. Today she pedro pablo 1.5 with act and worked on monitoring her stats with all task at times dropping to 87% but able to take two deep breathes and recover to 90%+. Educated her on monitoring her O2 stats at 1.5 L and upping O2 if she needs to to 2L and work up if needed. She expresses understanding. She did very well with ther ex with little fatigue noted. Will cont with POC to increase her functional mob and ind to prevent further decline in IND. S/B Milena Blackmon PT

## 2020-12-31 ENCOUNTER — HOME CARE VISIT (OUTPATIENT)
Dept: HOME HEALTH SERVICES | Facility: HOME HEALTHCARE | Age: 80
End: 2020-12-31
Payer: MEDICARE

## 2020-12-31 VITALS
OXYGEN SATURATION: 94 % | SYSTOLIC BLOOD PRESSURE: 110 MMHG | RESPIRATION RATE: 17 BRPM | DIASTOLIC BLOOD PRESSURE: 58 MMHG | TEMPERATURE: 98.1 F | HEART RATE: 78 BPM

## 2020-12-31 VITALS
HEART RATE: 75 BPM | SYSTOLIC BLOOD PRESSURE: 110 MMHG | TEMPERATURE: 98.1 F | DIASTOLIC BLOOD PRESSURE: 68 MMHG | RESPIRATION RATE: 16 BRPM | OXYGEN SATURATION: 95 %

## 2020-12-31 PROCEDURE — G0156 HHCP-SVS OF AIDE,EA 15 MIN: HCPCS

## 2020-12-31 PROCEDURE — G0157 HHC PT ASSISTANT EA 15: HCPCS | Mod: CQ

## 2020-12-31 PROCEDURE — G0495 RN CARE TRAIN/EDU IN HH: HCPCS

## 2020-12-31 SDOH — ECONOMIC STABILITY: HOUSING INSECURITY: EVIDENCE OF SMOKING MATERIAL: 0

## 2020-12-31 ASSESSMENT — ENCOUNTER SYMPTOMS
VOMITING: DENIES
LIMITED RANGE OF MOTION: 1
NAUSEA: DENIES

## 2021-01-05 ENCOUNTER — HOME CARE VISIT (OUTPATIENT)
Dept: HOME HEALTH SERVICES | Facility: HOME HEALTHCARE | Age: 81
End: 2021-01-05
Payer: MEDICARE

## 2021-01-05 ENCOUNTER — TELEPHONE (OUTPATIENT)
Dept: MEDICAL GROUP | Facility: PHYSICIAN GROUP | Age: 81
End: 2021-01-05

## 2021-01-05 VITALS
OXYGEN SATURATION: 98 % | RESPIRATION RATE: 16 BRPM | TEMPERATURE: 97.9 F | SYSTOLIC BLOOD PRESSURE: 100 MMHG | DIASTOLIC BLOOD PRESSURE: 80 MMHG | HEART RATE: 82 BPM

## 2021-01-05 PROCEDURE — G0157 HHC PT ASSISTANT EA 15: HCPCS | Mod: CQ

## 2021-01-05 PROCEDURE — G0152 HHCP-SERV OF OT,EA 15 MIN: HCPCS

## 2021-01-05 PROCEDURE — G0299 HHS/HOSPICE OF RN EA 15 MIN: HCPCS

## 2021-01-05 ASSESSMENT — FIBROSIS 4 INDEX: FIB4 SCORE: 1.11

## 2021-01-05 NOTE — PROGRESS NOTES
Jammie is cont to progress worked today with O2 off during sitting ther ex and her stats maintained at 90% + with sitting ther ex. She has began to amb in the home with no AD and feels safe She is doing very well with self monitoring her O2 levels. She has been able to keep her O2 stats at 1.5L. Her O2 is stored in living area in upright postion in dominguez. Will cont with POC to increase her functional mob and ind to prevent further decline in IND. S/B Milena Blackmon PT

## 2021-01-05 NOTE — TELEPHONE ENCOUNTER
ESTABLISHED PATIENT PRE-VISIT PLANNING     Patient was NOT contacted to complete PVP.     Note: Patient will not be contacted if there is no indication to call.     1.  Reviewed notes from the last few office visits within the medical group: Yes    2.  If any orders were placed at last visit or intended to be done for this visit (i.e. 6 mos follow-up), do we have Results/Consult Notes?         •  Labs - Labs were not ordered at last office visit.  Note: If patient appointment is for lab review and patient did not complete labs, check with provider if OK to reschedule patient until labs completed.       •  Imaging - Imaging was not ordered at last office visit.       •  Referrals - No referrals were ordered at last office visit.    3. Is this appointment scheduled as a Hospital Follow-Up? Yes, visit was at [facility].     4.  Immunizations were updated in Epic using Reconcile Outside Information activity? Yes    5.  Patient is due for the following Health Maintenance Topics:   Health Maintenance Due   Topic Date Due   • Annual Wellness Visit  06/04/2020       6.  AHA (Pulse8) form printed for Provider? No, already completed

## 2021-01-06 VITALS
OXYGEN SATURATION: 98 % | HEART RATE: 82 BPM | DIASTOLIC BLOOD PRESSURE: 80 MMHG | TEMPERATURE: 97.9 F | RESPIRATION RATE: 16 BRPM | SYSTOLIC BLOOD PRESSURE: 100 MMHG

## 2021-01-06 ASSESSMENT — ENCOUNTER SYMPTOMS: SEVERE DYSPNEA: 1

## 2021-01-07 ENCOUNTER — HOME CARE VISIT (OUTPATIENT)
Dept: HOME HEALTH SERVICES | Facility: HOME HEALTHCARE | Age: 81
End: 2021-01-07
Payer: MEDICARE

## 2021-01-07 VITALS
TEMPERATURE: 98.1 F | OXYGEN SATURATION: 95 % | HEART RATE: 66 BPM | SYSTOLIC BLOOD PRESSURE: 118 MMHG | RESPIRATION RATE: 16 BRPM | DIASTOLIC BLOOD PRESSURE: 60 MMHG

## 2021-01-07 VITALS
TEMPERATURE: 97.9 F | DIASTOLIC BLOOD PRESSURE: 80 MMHG | WEIGHT: 211.2 LBS | RESPIRATION RATE: 16 BRPM | BODY MASS INDEX: 41.25 KG/M2 | OXYGEN SATURATION: 98 % | HEART RATE: 82 BPM | SYSTOLIC BLOOD PRESSURE: 100 MMHG

## 2021-01-07 PROCEDURE — G0157 HHC PT ASSISTANT EA 15: HCPCS | Mod: CQ

## 2021-01-07 SDOH — ECONOMIC STABILITY: HOUSING INSECURITY: EVIDENCE OF SMOKING MATERIAL: 0

## 2021-01-07 ASSESSMENT — ENCOUNTER SYMPTOMS
MUSCLE WEAKNESS: 1
SHORTNESS OF BREATH: F

## 2021-01-08 NOTE — PROGRESS NOTES
Jammie is cont to progress however today she had increse fatigue with 1 min of task She is still recovering well with 1.5 L of O2. Educated her again on incresing her amb more to at least 3 min three times a day. She cont to have a good carry over with HEP. Will cont with POC to increase her functional mob and ind to prevent further decline in IND. S/B Milena Blackmon PT

## 2021-01-11 ENCOUNTER — HOME CARE VISIT (OUTPATIENT)
Dept: HOME HEALTH SERVICES | Facility: HOME HEALTHCARE | Age: 81
End: 2021-01-11
Payer: MEDICARE

## 2021-01-11 VITALS
RESPIRATION RATE: 17 BRPM | HEART RATE: 71 BPM | DIASTOLIC BLOOD PRESSURE: 70 MMHG | TEMPERATURE: 98 F | OXYGEN SATURATION: 98 % | SYSTOLIC BLOOD PRESSURE: 108 MMHG

## 2021-01-11 VITALS
RESPIRATION RATE: 17 BRPM | DIASTOLIC BLOOD PRESSURE: 70 MMHG | HEART RATE: 71 BPM | OXYGEN SATURATION: 98 % | TEMPERATURE: 97.6 F | SYSTOLIC BLOOD PRESSURE: 108 MMHG

## 2021-01-11 DIAGNOSIS — Z23 NEED FOR VACCINATION: ICD-10-CM

## 2021-01-11 PROCEDURE — G0157 HHC PT ASSISTANT EA 15: HCPCS | Mod: CQ

## 2021-01-11 PROCEDURE — G0152 HHCP-SERV OF OT,EA 15 MIN: HCPCS

## 2021-01-11 NOTE — Clinical Note
Thank You  ----- Message -----  From: EVANGELIST IyerRJARED.  Sent: 1/11/2021   9:00 PM PST  To: Kelin EDWAR Cunha      Weaning is appropriate and if her saturation is stable above 90 when at rest she can take if off for short periods at a time, monitoring her O2.  I recommend she has the oxygen next to the shower as well as a shower chair to use if needed.   ----- Message -----  From: Kelin EDWAR Cunha  Sent: 1/11/2021   5:39 PM PST  To: NORA Iyer    PLEASE ADVISE - Jammie is reporting that she takes her O2 off when showering and then puts it on as soon as she is done. She does not have an order to not use O2 when showering. She is also asking if she is able to try sitting without using her O2 when watching TV. She reports that her O2 is above 90% when sitting at RA. Her stats do drop into 85% with amb with O2 on 1.5 L but she recovers after 30 sec of rest and 2 deep breathes to above 90%. We need a order to work on weaning her if this a goal. Please advise

## 2021-01-11 NOTE — Clinical Note
Can we get that order in the care plans Thank You  ----- Message -----  From: EVANGELIST IyerRESTEVAN  Sent: 1/11/2021   9:00 PM PST  To: Kelin EDWAR Cunha      Weaning is appropriate and if her saturation is stable above 90 when at rest she can take if off for short periods at a time, monitoring her O2.  I recommend she has the oxygen next to the shower as well as a shower chair to use if needed.   ----- Message -----  From: Kelin EDWAR Cunha  Sent: 1/11/2021   5:39 PM PST  To: NORA Iyer    PLEASE ADVISE - Jammie is reporting that she takes her O2 off when showering and then puts it on as soon as she is done. She does not have an order to not use O2 when showering. She is also asking if she is able to try sitting without using her O2 when watching TV. She reports that her O2 is above 90% when sitting at RA. Her stats do drop into 85% with amb with O2 on 1.5 L but she recovers after 30 sec of rest and 2 deep breathes to above 90%. We need a order to work on weaning her if this a goal. Please advise

## 2021-01-12 ENCOUNTER — HOME CARE VISIT (OUTPATIENT)
Dept: HOME HEALTH SERVICES | Facility: HOME HEALTHCARE | Age: 81
End: 2021-01-12
Payer: MEDICARE

## 2021-01-12 VITALS
HEART RATE: 74 BPM | TEMPERATURE: 98.5 F | WEIGHT: 214 LBS | DIASTOLIC BLOOD PRESSURE: 74 MMHG | SYSTOLIC BLOOD PRESSURE: 122 MMHG | RESPIRATION RATE: 18 BRPM | BODY MASS INDEX: 41.79 KG/M2 | OXYGEN SATURATION: 98 %

## 2021-01-12 PROCEDURE — G0495 RN CARE TRAIN/EDU IN HH: HCPCS

## 2021-01-12 ASSESSMENT — FIBROSIS 4 INDEX: FIB4 SCORE: 1.11

## 2021-01-12 ASSESSMENT — ENCOUNTER SYMPTOMS
VOMITING: DENIES
NAUSEA: DENIES

## 2021-01-12 NOTE — PROGRESS NOTES
Jammie is cont to progress she has been having decreasing with stats to 85% but recover to 90%+ with amb but recovers in 30 sec. She is reporting that she is showering without her O2 on and reports that she puts it on as soon as she get out and she reports that she is doing ok with it. She would like to be able to start sitting around and trying to wean off of her O2 and is able to moniotr her stats. She is to speak with MD Asencio. She cont to have a strong carry over with HEP and is doing her recumbent bike daily x 10 min. PT to f/u on next visit. S/B Milena Blackmon PT

## 2021-01-13 ENCOUNTER — HOME CARE VISIT (OUTPATIENT)
Dept: HOME HEALTH SERVICES | Facility: HOME HEALTHCARE | Age: 81
End: 2021-01-13
Payer: MEDICARE

## 2021-01-13 PROCEDURE — G0151 HHCP-SERV OF PT,EA 15 MIN: HCPCS

## 2021-01-14 ENCOUNTER — HOSPITAL ENCOUNTER (OUTPATIENT)
Dept: LAB | Facility: MEDICAL CENTER | Age: 81
End: 2021-01-14
Attending: NURSE PRACTITIONER
Payer: MEDICARE

## 2021-01-14 ENCOUNTER — OFFICE VISIT (OUTPATIENT)
Dept: MEDICAL GROUP | Facility: PHYSICIAN GROUP | Age: 81
End: 2021-01-14
Payer: MEDICARE

## 2021-01-14 VITALS
HEART RATE: 80 BPM | TEMPERATURE: 98 F | DIASTOLIC BLOOD PRESSURE: 60 MMHG | SYSTOLIC BLOOD PRESSURE: 110 MMHG | RESPIRATION RATE: 16 BRPM | OXYGEN SATURATION: 99 %

## 2021-01-14 VITALS
OXYGEN SATURATION: 97 % | TEMPERATURE: 99.3 F | DIASTOLIC BLOOD PRESSURE: 68 MMHG | SYSTOLIC BLOOD PRESSURE: 116 MMHG | HEART RATE: 83 BPM | HEIGHT: 60 IN | BODY MASS INDEX: 42.8 KG/M2 | WEIGHT: 218 LBS

## 2021-01-14 DIAGNOSIS — E03.9 ACQUIRED HYPOTHYROIDISM: ICD-10-CM

## 2021-01-14 DIAGNOSIS — E87.1 HYPONATREMIA: ICD-10-CM

## 2021-01-14 DIAGNOSIS — E66.01 SEVERE OBESITY (BMI >= 40) (HCC): ICD-10-CM

## 2021-01-14 DIAGNOSIS — J12.82 PNEUMONIA DUE TO COVID-19 VIRUS: ICD-10-CM

## 2021-01-14 DIAGNOSIS — U07.1 PNEUMONIA DUE TO COVID-19 VIRUS: ICD-10-CM

## 2021-01-14 DIAGNOSIS — R06.09 DOE (DYSPNEA ON EXERTION): ICD-10-CM

## 2021-01-14 DIAGNOSIS — N18.32 STAGE 3B CHRONIC KIDNEY DISEASE: ICD-10-CM

## 2021-01-14 DIAGNOSIS — E55.9 VITAMIN D DEFICIENCY: ICD-10-CM

## 2021-01-14 DIAGNOSIS — R01.1 MURMUR: ICD-10-CM

## 2021-01-14 LAB
25(OH)D3 SERPL-MCNC: 73 NG/ML (ref 30–100)
ALBUMIN SERPL BCP-MCNC: 3.7 G/DL (ref 3.2–4.9)
ALBUMIN/GLOB SERPL: 1.2 G/DL
ALP SERPL-CCNC: 103 U/L (ref 30–99)
ALT SERPL-CCNC: 9 U/L (ref 2–50)
ANION GAP SERPL CALC-SCNC: 10 MMOL/L (ref 7–16)
AST SERPL-CCNC: 20 U/L (ref 12–45)
BASOPHILS # BLD AUTO: 0.9 % (ref 0–1.8)
BASOPHILS # BLD: 0.07 K/UL (ref 0–0.12)
BILIRUB SERPL-MCNC: 0.3 MG/DL (ref 0.1–1.5)
BUN SERPL-MCNC: 8 MG/DL (ref 8–22)
CALCIUM SERPL-MCNC: 9.2 MG/DL (ref 8.5–10.5)
CHLORIDE SERPL-SCNC: 104 MMOL/L (ref 96–112)
CO2 SERPL-SCNC: 22 MMOL/L (ref 20–33)
CREAT SERPL-MCNC: 0.98 MG/DL (ref 0.5–1.4)
D DIMER PPP IA.FEU-MCNC: 1.12 UG/ML (FEU) (ref 0–0.5)
EOSINOPHIL # BLD AUTO: 0.3 K/UL (ref 0–0.51)
EOSINOPHIL NFR BLD: 3.7 % (ref 0–6.9)
ERYTHROCYTE [DISTWIDTH] IN BLOOD BY AUTOMATED COUNT: 57.8 FL (ref 35.9–50)
GLOBULIN SER CALC-MCNC: 3.1 G/DL (ref 1.9–3.5)
GLUCOSE SERPL-MCNC: 101 MG/DL (ref 65–99)
HCT VFR BLD AUTO: 35.9 % (ref 37–47)
HGB BLD-MCNC: 11.2 G/DL (ref 12–16)
IMM GRANULOCYTES # BLD AUTO: 0.03 K/UL (ref 0–0.11)
IMM GRANULOCYTES NFR BLD AUTO: 0.4 % (ref 0–0.9)
LYMPHOCYTES # BLD AUTO: 2.57 K/UL (ref 1–4.8)
LYMPHOCYTES NFR BLD: 31.7 % (ref 22–41)
MCH RBC QN AUTO: 30.3 PG (ref 27–33)
MCHC RBC AUTO-ENTMCNC: 31.2 G/DL (ref 33.6–35)
MCV RBC AUTO: 97 FL (ref 81.4–97.8)
MONOCYTES # BLD AUTO: 1.01 K/UL (ref 0–0.85)
MONOCYTES NFR BLD AUTO: 12.4 % (ref 0–13.4)
NEUTROPHILS # BLD AUTO: 4.14 K/UL (ref 2–7.15)
NEUTROPHILS NFR BLD: 50.9 % (ref 44–72)
NRBC # BLD AUTO: 0 K/UL
NRBC BLD-RTO: 0 /100 WBC
PLATELET # BLD AUTO: 321 K/UL (ref 164–446)
PMV BLD AUTO: 9.9 FL (ref 9–12.9)
POTASSIUM SERPL-SCNC: 4.3 MMOL/L (ref 3.6–5.5)
PROT SERPL-MCNC: 6.8 G/DL (ref 6–8.2)
RBC # BLD AUTO: 3.7 M/UL (ref 4.2–5.4)
SODIUM SERPL-SCNC: 136 MMOL/L (ref 135–145)
TSH SERPL DL<=0.005 MIU/L-ACNC: 1.84 UIU/ML (ref 0.38–5.33)
WBC # BLD AUTO: 8.1 K/UL (ref 4.8–10.8)

## 2021-01-14 PROCEDURE — 82306 VITAMIN D 25 HYDROXY: CPT

## 2021-01-14 PROCEDURE — 84443 ASSAY THYROID STIM HORMONE: CPT

## 2021-01-14 PROCEDURE — 80053 COMPREHEN METABOLIC PANEL: CPT

## 2021-01-14 PROCEDURE — 36415 COLL VENOUS BLD VENIPUNCTURE: CPT

## 2021-01-14 PROCEDURE — 99214 OFFICE O/P EST MOD 30 MIN: CPT | Mod: CS | Performed by: NURSE PRACTITIONER

## 2021-01-14 PROCEDURE — 85379 FIBRIN DEGRADATION QUANT: CPT

## 2021-01-14 PROCEDURE — 85025 COMPLETE CBC W/AUTO DIFF WBC: CPT

## 2021-01-14 RX ORDER — MULTIVIT WITH MINERALS/LUTEIN
TABLET ORAL
COMMUNITY

## 2021-01-14 SDOH — ECONOMIC STABILITY: HOUSING INSECURITY: EVIDENCE OF SMOKING MATERIAL: 0

## 2021-01-14 ASSESSMENT — FIBROSIS 4 INDEX: FIB4 SCORE: 1.11

## 2021-01-14 ASSESSMENT — PATIENT HEALTH QUESTIONNAIRE - PHQ9: CLINICAL INTERPRETATION OF PHQ2 SCORE: 0

## 2021-01-14 ASSESSMENT — ACTIVITIES OF DAILY LIVING (ADL)
ADLS_COMMENTS: PLEASE SEE OT NOTES
FEEDING_COMMENTS: PLEASE SEE OT NOTES
BATHING_COMMENTS: PLEASE SEE OT NOTES
GROOMING_COMMENTS: PLEASE SEE OT NOTES

## 2021-01-14 NOTE — PROGRESS NOTES
Chief Complaint   Patient presents with   • Hospital Follow-up         Subjective:     Jammie Berry is a 80 y.o. female presenting for follow up    Covid19 infection: For hospital follow-up.  Patient was diagnosed with COVID-19 the end of 2020, has had significant course of infection with waxing and waning severity and symptoms over the past 2 months.  Fortunately, now established with [facility] and is doing quite well.  On 1.5 L oxygen continuous flow via nasal cannula.  Gradually increasing exercise tolerance and endurance with physical therapy and Occupational Therapy self.  Removes oxygen while doing tasks around the house during the day, measures oxygen saturation dipping down into the mid to high 80s.    When she is doing activity without her oxygen such as showering, she denies any syncopal episodes, shortness of breath, lightheadedness, dizziness.  She states she does have a shower chair that she utilizes and keeps her chin outside of the shower that she can use it immediately if needed.    During today's visit, new systolic murmur auscultated 2-3 out of 6.  Nonradiating to axilla or her carotids.  Due to recent Covid infection, would benefit from echocardiogram to determine source and severity.    Discussed increased risk of venous thrombosis post COVID-19 infection with elevated D-dimer.  However patient has a labile renal function, so daily low-dose aspirin contraindicated due to significant nephrotoxicity risk .  Reviewed signs and symptoms of DVT, PE, CVA for close monitoring and need for emergent services      Review of systems:      Denies chest pain, sore throat, difficulty swallowing,  dizziness, severe headache, altered cognition, changes in bowel or bladder habits, decreased sensation, decreased strength, numbness or tingling, intolerable depression or anxiety, rash or skin concerns, painful or swollen lymph nodes.       Current Outpatient Medications:   •  Ascorbic Acid (VITAMIN  C) 1000 MG Tab, Take  by mouth., Disp: , Rfl:   •  Home Care Oxygen, Inhale 2 L/min continuous. Oxygen dose range: 1.5-4 L/minute Respiratory route via: Nasal Cannula  Oxygen supplier: Preferred 2L/minute at rest; may increase O2 to 4L/minute when ambulating or upon exertion. , Disp: , Rfl:   •  acetaminophen (TYLENOL) 325 MG Tab, Take 2 Tabs by mouth every 6 hours as needed (Mild Pain; (Pain scale 1-3); Temp greater than 100.5 F)., Disp: 30 Tab, Rfl: 0  •  benzocaine-menthol (CEPACOL) 15-3.6 MG Lozenge, Dissolve 1 Lozenge in the mouth every 2 hours as needed., Disp:  , Rfl:   •  atenolol (TENORMIN) 25 MG Tab, Take 1 Tab by mouth every day., Disp: 90 Tab, Rfl: 3  •  levothyroxine (SYNTHROID) 50 MCG Tab, Take 1 Tab by mouth every day., Disp: 90 Tab, Rfl: 3  •  telmisartan (MICARDIS) 40 MG Tab, Take 1 Tab by mouth every day., Disp: 100 Tab, Rfl: 3  •  cyanocobalamin (VITAMIN B-12) 500 MCG Tab, Take 500 mcg by mouth every day., Disp: , Rfl:   •  Cholecalciferol (VITAMIN D-3) 25 MCG (1000 UT) Cap, Take 1,000 Units by mouth every day., Disp: , Rfl:   •  therapeutic multivitamin-minerals (THERAGRAN-M) TABS, Take 1 Tab by mouth every morning., Disp: , Rfl:     Allergies, past medical history, past surgical history, family history, social history reviewed and updated    Objective:     Vitals: /68 (BP Location: Right arm, Patient Position: Sitting, BP Cuff Size: Adult)   Pulse 83   Temp 37.4 °C (99.3 °F) (Temporal)   Ht 1.524 m (5')   Wt 98.9 kg (218 lb)   SpO2 97% Comment: 1.5 L  BMI 42.58 kg/m²   General: Alert, cooperative, dressed appropriately for weather / situation  Eyes:Normocephalic.  EOMI, no icterus or pallor.  Conjunctivae clear without erythema / irritation.  ENT:  External ears developed;   Lymph: Neck supple, absent of cervical or supraclavicular lymphadenopathy.  Thyroid palpated, free of masses or goiter.   Heart: Regular rate and rhythm.  S1 and S2 normal.  No murmurs auscultated; no murmurs /  bruits heard over bilateral carotids.  Bilateral radial pulses strong and equal.  Bilateral posterior tibial pulses present and equal.  Respiratory: Normal respiratory effort.  Clear to auscultation bilaterally.  AP ratio 1:2   Abdomen: Non-distended;   Skin: Visible skin intact, dry, without rash.  Musculoskeletal: Gait is normal.  Bilateral  strength strong equal.  Moves extremities freely and equally bilaterally  Neuro:  AAOx3 Visual tracking intact, no nystagmus;   Psych:  Affect/mood is normal, judgement is good, memory is intact, grooming is appropriate.    Assessment/Plan:     Jammie was seen today for hospital follow-up.    Diagnoses and all orders for this visit:    Pneumonia due to COVID-19 virus, hypoxia  -     CBC WITH DIFFERENTIAL; Future  -     D-DIMER; Future  -     EC-ECHOCARDIOGRAM COMPLETE W/O CONT; Future    Stage 3b chronic kidney disease  -     Comp Metabolic Panel; Future    Acquired hypothyroidism  -     TSH WITH REFLEX TO FT4; Future    Hyponatremia  -     Comp Metabolic Panel; Future    Vitamin D deficiency  -     VITAMIN D,25 HYDROXY; Future    HITCHCOCK (dyspnea on exertion)  -     EC-ECHOCARDIOGRAM COMPLETE W/O CONT; Future    Murmur  -     EC-ECHOCARDIOGRAM COMPLETE W/O CONT; Future    Severe obesity (BMI >= 40) (HCC)          Return in about 2 months (around 3/14/2021) for Annual Wellness.    Patient verbalized understanding and agreed to plan of care.  Encouraged to contact me with needs via Vide[employer] or by phone if needed.      I have placed the above orders and discussed them with an approved delegating provider.  The MA is performing the below orders under the direction of Dr Crocker.    Please note that this dictation was created using voice recognition software. I have made every reasonable attempt to correct obvious errors, but I expect that there are errors of grammar and possibly content that I did not discover before finalizing the note.

## 2021-01-19 ENCOUNTER — HOME CARE VISIT (OUTPATIENT)
Dept: HOME HEALTH SERVICES | Facility: HOME HEALTHCARE | Age: 81
End: 2021-01-19
Payer: MEDICARE

## 2021-01-19 VITALS
SYSTOLIC BLOOD PRESSURE: 108 MMHG | RESPIRATION RATE: 17 BRPM | HEART RATE: 75 BPM | BODY MASS INDEX: 41.79 KG/M2 | OXYGEN SATURATION: 98 % | TEMPERATURE: 98.1 F | WEIGHT: 214 LBS | DIASTOLIC BLOOD PRESSURE: 62 MMHG

## 2021-01-19 PROCEDURE — G0493 RN CARE EA 15 MIN HH/HOSPICE: HCPCS

## 2021-01-19 SDOH — ECONOMIC STABILITY: HOUSING INSECURITY: EVIDENCE OF SMOKING MATERIAL: 0

## 2021-01-19 ASSESSMENT — ENCOUNTER SYMPTOMS: SHORTNESS OF BREATH: T

## 2021-01-19 ASSESSMENT — PATIENT HEALTH QUESTIONNAIRE - PHQ9: CLINICAL INTERPRETATION OF PHQ2 SCORE: 0

## 2021-01-19 ASSESSMENT — FIBROSIS 4 INDEX: FIB4 SCORE: 1.62

## 2021-01-20 ENCOUNTER — HOME CARE VISIT (OUTPATIENT)
Dept: HOME HEALTH SERVICES | Facility: HOME HEALTHCARE | Age: 81
End: 2021-01-20
Payer: MEDICARE

## 2021-01-20 VITALS
HEART RATE: 80 BPM | TEMPERATURE: 97.8 F | SYSTOLIC BLOOD PRESSURE: 118 MMHG | OXYGEN SATURATION: 98 % | RESPIRATION RATE: 18 BRPM | DIASTOLIC BLOOD PRESSURE: 80 MMHG

## 2021-01-20 PROCEDURE — G0152 HHCP-SERV OF OT,EA 15 MIN: HCPCS

## 2021-01-20 PROCEDURE — 665003 FOLLOW UP-HOME HEALTH

## 2021-01-20 SDOH — ECONOMIC STABILITY: HOUSING INSECURITY: HOME SAFETY: REVIEWED ENERGY CONSERVATION, O2 MONITORING, AND ACTIVITY PACING.

## 2021-01-20 SDOH — ECONOMIC STABILITY: HOUSING INSECURITY: EVIDENCE OF SMOKING MATERIAL: 0

## 2021-01-20 ASSESSMENT — ACTIVITIES OF DAILY LIVING (ADL)
TELEPHONE USE ASSESSED: 1
TOILETING: INDEPENDENT
BATHING_CURRENT_FUNCTION: INDEPENDENT
AMBULATION ASSISTANCE: INDEPENDENT
DRESSING_LB_CURRENT_FUNCTION: INDEPENDENT
GROOMING_CURRENT_FUNCTION: INDEPENDENT
PHYSICAL TRANSFERS ASSESSED: 1
PREPARING MEALS: INDEPENDENT
BATHING ASSESSED: 1
CURRENT_FUNCTION: INDEPENDENT
FEEDING ASSESSED: 1
AMBULATION ASSISTANCE: 1
ORAL_CARE_ASSESSED: 1
LAUNDRY ASSESSED: 1
ORAL_CARE_CURRENT_FUNCTION: INDEPENDENT
USING THE TELPHONE: INDEPENDENT
TOILETING: 1
DRESSING_UB_CURRENT_FUNCTION: INDEPENDENT
LIGHT HOUSEKEEPING: INDEPENDENT
LAUNDRY: INDEPENDENT
GROOMING ASSESSED: 1
HOUSEKEEPING ASSESSED: 1
FEEDING: INDEPENDENT

## 2021-01-21 ENCOUNTER — ANTICOAGULATION MONITORING (OUTPATIENT)
Dept: MEDICAL GROUP | Facility: PHYSICIAN GROUP | Age: 81
End: 2021-01-21

## 2021-01-21 ENCOUNTER — HOME CARE VISIT (OUTPATIENT)
Dept: HOME HEALTH SERVICES | Facility: HOME HEALTHCARE | Age: 81
End: 2021-01-21
Payer: MEDICARE

## 2021-01-21 VITALS
DIASTOLIC BLOOD PRESSURE: 68 MMHG | OXYGEN SATURATION: 92 % | HEART RATE: 72 BPM | TEMPERATURE: 99.1 F | RESPIRATION RATE: 17 BRPM | SYSTOLIC BLOOD PRESSURE: 106 MMHG

## 2021-01-21 PROCEDURE — G0151 HHCP-SERV OF PT,EA 15 MIN: HCPCS

## 2021-01-21 ASSESSMENT — ACTIVITIES OF DAILY LIVING (ADL)
AMBULATION ASSISTANCE ON FLAT SURFACES: 1
AMBULATION_DISTANCE/DURATION_TOLERATED: 120 FEET
AMBULATION ASSISTANCE ON UNEVEN SURFACES: 1

## 2021-01-21 ASSESSMENT — ENCOUNTER SYMPTOMS
SEVERE DYSPNEA: 1
DEBILITATING PAIN: 1

## 2021-01-21 NOTE — PROGRESS NOTES
Medication chart review for [facility] services    PCP:  NORA Iyer  [address]  [place] [address]  Fax: [phone]    Current medication list     Current Outpatient Medications:   •  Vitamin C, Take  by mouth.  •  Home Care Oxygen, 2 L/min, Inhalation, Continuous  •  acetaminophen, 650 mg, Oral, Q6HRS PRN  •  benzocaine-menthol, 1 Lozenge, Mouth/Throat, Q2HRS PRN  •  atenolol, 25 mg, Oral, QDAY  •  levothyroxine, 50 mcg, Oral, QDAY  •  telmisartan, 40 mg, Oral, QDAY  •  cyanocobalamin, 500 mcg, Oral, DAILY  •  Vitamin D-3, 1,000 Units, Oral, DAILY  •  therapeutic multivitamin-minerals, 1 Tab, Oral, QAM    No Known Allergies    Medications on discharge summary that are not on their home medication list (or the dosing interval differs from the discharge summary)     Hospital discharge summary was approximately 2 months on    Medications on home medication list not listed on their discharge summary     Not applicable     Labs / Images Reviewed:     Lab Results   Component Value Date/Time    SODIUM 136 01/14/2021 01:42 PM    POTASSIUM 4.3 01/14/2021 01:42 PM    CHLORIDE 104 01/14/2021 01:42 PM    CO2 22 01/14/2021 01:42 PM    GLUCOSE 101 (H) 01/14/2021 01:42 PM    BUN 8 01/14/2021 01:42 PM    CREATININE 0.98 01/14/2021 01:42 PM      Lab Results   Component Value Date/Time    ALKPHOSPHAT 103 (H) 01/14/2021 01:42 PM    ASTSGOT 20 01/14/2021 01:42 PM    ALTSGPT 9 01/14/2021 01:42 PM    TBILIRUBIN 0.3 01/14/2021 01:42 PM    ALBUMIN 3.7 01/14/2021 01:42 PM          Potential drug interactions:         Assessment and Plan:   • Received referral from [facility]. Medications reviewed. No clinically significant interactions noted.             Ady Cooper, PharmD, MS, BCACP, LCC  [facility]  Phone [phone] fax [phone]    This note was created using voice recognition software (Dragon). The accuracy of the dictation is limited by the  abilities of the software. I have reviewed the note prior to signing, however some errors in grammar and context are still possible. If you have any questions related to this note please do not hesitate to contact our office.

## 2021-01-26 ENCOUNTER — HOME CARE VISIT (OUTPATIENT)
Dept: HOME HEALTH SERVICES | Facility: HOME HEALTHCARE | Age: 81
End: 2021-01-26
Payer: MEDICARE

## 2021-01-26 VITALS
OXYGEN SATURATION: 94 % | TEMPERATURE: 97.7 F | RESPIRATION RATE: 17 BRPM | SYSTOLIC BLOOD PRESSURE: 118 MMHG | HEART RATE: 75 BPM | DIASTOLIC BLOOD PRESSURE: 70 MMHG

## 2021-01-26 PROCEDURE — G0157 HHC PT ASSISTANT EA 15: HCPCS | Mod: CQ

## 2021-01-26 ASSESSMENT — ACTIVITIES OF DAILY LIVING (ADL): OASIS_M1830: 03

## 2021-01-27 ENCOUNTER — IMMUNIZATION (OUTPATIENT)
Dept: FAMILY PLANNING/WOMEN'S HEALTH CLINIC | Facility: IMMUNIZATION CENTER | Age: 81
End: 2021-01-27
Attending: INTERNAL MEDICINE
Payer: MEDICARE

## 2021-01-27 DIAGNOSIS — Z23 ENCOUNTER FOR VACCINATION: Primary | ICD-10-CM

## 2021-01-27 DIAGNOSIS — Z23 NEED FOR VACCINATION: ICD-10-CM

## 2021-01-27 PROCEDURE — 0001A PFIZER SARS-COV-2 VACCINE: CPT

## 2021-01-27 PROCEDURE — 91300 PFIZER SARS-COV-2 VACCINE: CPT

## 2021-01-27 NOTE — PROGRESS NOTES
Jammie is cont to progress educated her on sitting without her O2 on as her stats stay above 94%. She is able to self monitor. With higher level task her stats cont to drop to 85% but she is able to sit and recover with deep breathing tech within 1 min to above 90%. She will began working on increase HEP to focus on challening herself more with bike and amb while monitoring her O2 levels Will cont with POC to increase her functional mob and ind to prevent further decline in IND. S/B Carolyn Zazueta PT

## 2021-01-28 ENCOUNTER — HOSPITAL ENCOUNTER (OUTPATIENT)
Facility: MEDICAL CENTER | Age: 81
End: 2021-01-28
Attending: INTERNAL MEDICINE
Payer: MEDICARE

## 2021-01-28 ENCOUNTER — HOME CARE VISIT (OUTPATIENT)
Dept: HOME HEALTH SERVICES | Facility: HOME HEALTHCARE | Age: 81
End: 2021-01-28
Payer: MEDICARE

## 2021-01-28 DIAGNOSIS — D64.9 ANEMIA, UNSPECIFIED TYPE: ICD-10-CM

## 2021-01-28 DIAGNOSIS — I10 ESSENTIAL HYPERTENSION: ICD-10-CM

## 2021-01-28 DIAGNOSIS — N18.30 CKD (CHRONIC KIDNEY DISEASE), STAGE III: ICD-10-CM

## 2021-01-28 LAB
25(OH)D3 SERPL-MCNC: 65 NG/ML (ref 30–100)
ANION GAP SERPL CALC-SCNC: 7 MMOL/L (ref 7–16)
APPEARANCE UR: CLEAR
BILIRUB UR QL STRIP.AUTO: NEGATIVE
BUN SERPL-MCNC: 14 MG/DL (ref 8–22)
CALCIUM SERPL-MCNC: 9.2 MG/DL (ref 8.5–10.5)
CHLORIDE SERPL-SCNC: 105 MMOL/L (ref 96–112)
CHOLEST SERPL-MCNC: 188 MG/DL (ref 100–199)
CO2 SERPL-SCNC: 24 MMOL/L (ref 20–33)
COLOR UR: YELLOW
CREAT SERPL-MCNC: 0.87 MG/DL (ref 0.5–1.4)
CREAT UR-MCNC: 48.1 MG/DL
ERYTHROCYTE [DISTWIDTH] IN BLOOD BY AUTOMATED COUNT: 56.2 FL (ref 35.9–50)
GLUCOSE SERPL-MCNC: 84 MG/DL (ref 65–99)
GLUCOSE UR STRIP.AUTO-MCNC: NEGATIVE MG/DL
HCT VFR BLD AUTO: 38.9 % (ref 37–47)
HDLC SERPL-MCNC: 61 MG/DL
HGB BLD-MCNC: 12.2 G/DL (ref 12–16)
KETONES UR STRIP.AUTO-MCNC: NEGATIVE MG/DL
LDLC SERPL CALC-MCNC: 105 MG/DL
LEUKOCYTE ESTERASE UR QL STRIP.AUTO: NEGATIVE
MCH RBC QN AUTO: 30.5 PG (ref 27–33)
MCHC RBC AUTO-ENTMCNC: 31.4 G/DL (ref 33.6–35)
MCV RBC AUTO: 97.3 FL (ref 81.4–97.8)
MICRO URNS: NORMAL
MICROALBUMIN UR-MCNC: <1.2 MG/DL
MICROALBUMIN/CREAT UR: NORMAL MG/G (ref 0–30)
NITRITE UR QL STRIP.AUTO: NEGATIVE
PH UR STRIP.AUTO: 6.5 [PH] (ref 5–8)
PLATELET # BLD AUTO: 294 K/UL (ref 164–446)
PMV BLD AUTO: 10.3 FL (ref 9–12.9)
POTASSIUM SERPL-SCNC: 4.2 MMOL/L (ref 3.6–5.5)
PROT UR QL STRIP: NEGATIVE MG/DL
RBC # BLD AUTO: 4 M/UL (ref 4.2–5.4)
RBC UR QL AUTO: NEGATIVE
SODIUM SERPL-SCNC: 136 MMOL/L (ref 135–145)
SP GR UR STRIP.AUTO: 1.01
TRIGL SERPL-MCNC: 111 MG/DL (ref 0–149)
TSH SERPL DL<=0.005 MIU/L-ACNC: 2.42 UIU/ML (ref 0.38–5.33)
UROBILINOGEN UR STRIP.AUTO-MCNC: 0.2 MG/DL
WBC # BLD AUTO: 9.2 K/UL (ref 4.8–10.8)

## 2021-01-28 PROCEDURE — 82306 VITAMIN D 25 HYDROXY: CPT

## 2021-01-28 PROCEDURE — 80048 BASIC METABOLIC PNL TOTAL CA: CPT

## 2021-01-28 PROCEDURE — G0299 HHS/HOSPICE OF RN EA 15 MIN: HCPCS

## 2021-01-28 PROCEDURE — 80061 LIPID PANEL: CPT

## 2021-01-28 PROCEDURE — 81003 URINALYSIS AUTO W/O SCOPE: CPT

## 2021-01-28 PROCEDURE — 82043 UR ALBUMIN QUANTITATIVE: CPT

## 2021-01-28 PROCEDURE — 85027 COMPLETE CBC AUTOMATED: CPT

## 2021-01-28 PROCEDURE — 82570 ASSAY OF URINE CREATININE: CPT

## 2021-01-28 PROCEDURE — 84443 ASSAY THYROID STIM HORMONE: CPT

## 2021-01-28 ASSESSMENT — FIBROSIS 4 INDEX: FIB4 SCORE: 1.77

## 2021-01-29 PROCEDURE — G0179 MD RECERTIFICATION HHA PT: HCPCS | Mod: CS | Performed by: FAMILY MEDICINE

## 2021-01-29 SDOH — ECONOMIC STABILITY: HOUSING INSECURITY
HOME SAFETY: HALLWAY AND PAY ATTENTION TO OXYGEN TUBING AS PATIENT AND SPOUSE USES O2. SHE WAS ABLE TO REPEAT BACK TEACHING

## 2021-01-29 SDOH — ECONOMIC STABILITY: HOUSING INSECURITY
HOME SAFETY: CH LEVEL OF THE HOME. PATIENT DOES HAVE A FIRE ESCAPE PLAN DEVELOPED. PATIENT DOES NOT HAVE FLAMMABLE MATERIALS PRESENT IN THE HOME PRESENTING A FIRE HAZARD. NO EVIDENCE FOUND OF SMOKING MATERIALS PRESENT IN THE HOME.    EDUCATED PATIENT TO DECLUTTER

## 2021-01-29 SDOH — ECONOMIC STABILITY: HOUSING INSECURITY
HOME SAFETY: OXYGEN SAFETY RISK ASSESSMENT PERFORMED. PATIENT DOES HAVE A NO SMOKING SIGN POSTED IN THE HOME. PATIENT DOES NOT HAVE A WORKING FIRE EXTINGUISHER PRESENT IN THE HOME AND WILL GET ONE AS SOON AS ABLE TO.. SMOKE ALARMS ARE PRESENT AND FUNCTIONAL ON EA

## 2021-01-31 VITALS
RESPIRATION RATE: 18 BRPM | DIASTOLIC BLOOD PRESSURE: 64 MMHG | OXYGEN SATURATION: 98 % | TEMPERATURE: 97.9 F | BODY MASS INDEX: 41.64 KG/M2 | SYSTOLIC BLOOD PRESSURE: 110 MMHG | WEIGHT: 213.2 LBS | HEART RATE: 70 BPM

## 2021-01-31 SDOH — ECONOMIC STABILITY: HOUSING INSECURITY: EVIDENCE OF SMOKING MATERIAL: 0

## 2021-01-31 ASSESSMENT — ENCOUNTER SYMPTOMS: MUSCLE WEAKNESS: 1

## 2021-02-01 ENCOUNTER — HOME CARE VISIT (OUTPATIENT)
Dept: HOME HEALTH SERVICES | Facility: HOME HEALTHCARE | Age: 81
End: 2021-02-01
Payer: MEDICARE

## 2021-02-01 VITALS
OXYGEN SATURATION: 97 % | DIASTOLIC BLOOD PRESSURE: 60 MMHG | TEMPERATURE: 98.3 F | HEART RATE: 76 BPM | RESPIRATION RATE: 17 BRPM | SYSTOLIC BLOOD PRESSURE: 110 MMHG

## 2021-02-01 PROCEDURE — G0157 HHC PT ASSISTANT EA 15: HCPCS | Mod: CQ

## 2021-02-02 ENCOUNTER — HOME CARE VISIT (OUTPATIENT)
Dept: HOME HEALTH SERVICES | Facility: HOME HEALTHCARE | Age: 81
End: 2021-02-02
Payer: MEDICARE

## 2021-02-02 VITALS
TEMPERATURE: 97.9 F | WEIGHT: 212 LBS | BODY MASS INDEX: 41.4 KG/M2 | RESPIRATION RATE: 18 BRPM | DIASTOLIC BLOOD PRESSURE: 70 MMHG | OXYGEN SATURATION: 98 % | SYSTOLIC BLOOD PRESSURE: 120 MMHG | HEART RATE: 81 BPM

## 2021-02-02 PROCEDURE — G0299 HHS/HOSPICE OF RN EA 15 MIN: HCPCS

## 2021-02-02 ASSESSMENT — FIBROSIS 4 INDEX: FIB4 SCORE: 1.77

## 2021-02-02 NOTE — PROGRESS NOTES
Jammie has been sitting without her O2 for a min of 30 and her stats stay above 93% educated her that she can sit without O2 as long as her stats are above 90%. With sitting ther ex her stats drop to 88% with no O2 and she is able to recover with 2 deep breathes. With standing act with no O2 she still drops to 82% but able to sit breathe and recover within a min. Educated her to increase her bike time to 10 min and to use O2 when on her bike and to cont with rapid walks with her O2 on. Discussed DC plans and she agrees she can work IND and is not interested with OP PT. PT will f/u on next visit. Will cont with POC to increase her functional mob and ind to prevent further decline in IND. S/B Carolyn Zazueta PT

## 2021-02-04 ENCOUNTER — OFFICE VISIT (OUTPATIENT)
Dept: NEPHROLOGY | Facility: MEDICAL CENTER | Age: 81
End: 2021-02-04
Payer: MEDICARE

## 2021-02-04 ENCOUNTER — PATIENT OUTREACH (OUTPATIENT)
Dept: HEALTH INFORMATION MANAGEMENT | Facility: OTHER | Age: 81
End: 2021-02-04

## 2021-02-04 VITALS
BODY MASS INDEX: 42.56 KG/M2 | WEIGHT: 216.8 LBS | OXYGEN SATURATION: 95 % | HEIGHT: 60 IN | HEART RATE: 95 BPM | DIASTOLIC BLOOD PRESSURE: 62 MMHG | TEMPERATURE: 97.6 F | SYSTOLIC BLOOD PRESSURE: 132 MMHG

## 2021-02-04 DIAGNOSIS — E55.9 VITAMIN D DEFICIENCY DISEASE: ICD-10-CM

## 2021-02-04 DIAGNOSIS — I10 ESSENTIAL HYPERTENSION: ICD-10-CM

## 2021-02-04 DIAGNOSIS — R80.9 MICROALBUMINURIA: ICD-10-CM

## 2021-02-04 DIAGNOSIS — N18.2 CKD (CHRONIC KIDNEY DISEASE), STAGE II: ICD-10-CM

## 2021-02-04 DIAGNOSIS — D64.9 ANEMIA, UNSPECIFIED TYPE: ICD-10-CM

## 2021-02-04 PROCEDURE — 99213 OFFICE O/P EST LOW 20 MIN: CPT | Performed by: INTERNAL MEDICINE

## 2021-02-04 RX ORDER — ZINC SULFATE 50(220)MG
220 CAPSULE ORAL DAILY
COMMUNITY

## 2021-02-04 SDOH — ECONOMIC STABILITY: HOUSING INSECURITY: EVIDENCE OF SMOKING MATERIAL: 0

## 2021-02-04 ASSESSMENT — ENCOUNTER SYMPTOMS
BACK PAIN: 1
COUGH: 0
CHILLS: 0
NECK PAIN: 0
HEMOPTYSIS: 0
VOMITING: 0
SHORTNESS OF BREATH: 0
WHEEZING: 0
PALPITATIONS: 0
ABDOMINAL PAIN: 0
EYES NEGATIVE: 1
ORTHOPNEA: 0
DIARRHEA: 0
NAUSEA: 0
FLANK PAIN: 0
WEIGHT LOSS: 0
FEVER: 0
MYALGIAS: 0
SINUS PAIN: 0

## 2021-02-04 ASSESSMENT — FIBROSIS 4 INDEX: FIB4 SCORE: 1.77

## 2021-02-04 NOTE — PROGRESS NOTES
Scheduled member with 2nd dose of covid vaccine and informed to follow prior protocol for drive thru. Member had questions regarding her specialist copay. Educated member regarding current plan and changing to [facility]. Member informed she will have $50.00 copay for today visit and after 03/01/2021 copay will be $45.00. Plan updated and confirmation # [id number]. Tried educating regarding dental benefits, member was in a hurry and ended call. Will request materials for [facility] from PSN.

## 2021-02-04 NOTE — PROGRESS NOTES
Subjective:      Jammie Berry is a 80 y.o. female who presents with Follow-Up and Chronic Kidney Disease            Chronic Kidney Disease  Pertinent negatives include no abdominal pain, chest pain, chills, congestion, coughing, fever, myalgias, nausea, neck pain or vomiting.     Jammie is coming today for f/u of CKD III, HTN  Doing well , no complaints  No difficulties to urinate  No dysuria/hematuria/flank pain  No edema  CKD III -creat level improved to 0.8 -at CKD III  HTN:BP well controlled, compliant to low Na diet, medications    Review of Systems   Constitutional: Negative for chills, fever, malaise/fatigue and weight loss.   HENT: Negative for congestion, hearing loss and sinus pain.    Eyes: Negative.    Respiratory: Negative for cough, hemoptysis, shortness of breath and wheezing.    Cardiovascular: Negative for chest pain, palpitations, orthopnea and leg swelling.   Gastrointestinal: Negative for abdominal pain, diarrhea, nausea and vomiting.   Genitourinary: Negative for dysuria, flank pain, frequency, hematuria and urgency.   Musculoskeletal: Positive for back pain. Negative for myalgias and neck pain.   Skin: Negative.    All other systems reviewed and are negative.    Past Medical History:   Diagnosis Date   • Abnormal Pap smear of cervix     Dr. 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       Family History   Problem Relation Age of Onset   • Heart Attack Mother    • Alcohol abuse Mother         etoh   • Lung Disease Father         smoker   • Alcohol abuse Father         etoh   • Heart Attack Father    • Hypertension Sister    • Hyperlipidemia Sister    • Lung Disease Sister         smoker   • Colon Cancer Sister    • Lung Disease Maternal Aunt    • Heart Disease Maternal Aunt    • Hypertension Maternal Aunt    • Hyperlipidemia Maternal Aunt     • Stroke Maternal Grandmother    • Other Maternal Grandmother         TB of the brain   • Stroke Maternal Grandfather    • Colon Cancer Paternal Grandmother    • No Known Problems Paternal Grandfather        Social History     Socioeconomic History   • Marital status:      Spouse name: Not on file   • Number of children: 0   • Years of education: Not on file   • Highest education level: Not on file   Occupational History   • Occupation: retired CNA   Social Needs   • Financial resource strain: Not on file   • Food insecurity     Worry: Never true     Inability: Never true   • Transportation needs     Medical: No     Non-medical: No   Tobacco Use   • Smoking status: Former Smoker     Packs/day: 0.25     Years: 12.00     Pack years: 3.00     Types: Cigarettes     Quit date: 1977     Years since quittin.1   • Smokeless tobacco: Never Used   • Tobacco comment: Started smoking at age 25 (social smoker)   Substance and Sexual Activity   • Alcohol use: No     Alcohol/week: 0.0 oz   • Drug use: No   • Sexual activity: Never     Partners: Male     Comment: vaginal atrophy-dysparunia   Lifestyle   • Physical activity     Days per week: Not on file     Minutes per session: Not on file   • Stress: Not on file   Relationships   • Social connections     Talks on phone: Not on file     Gets together: Not on file     Attends Cheondoism service: Not on file     Active member of club or organization: Not on file     Attends meetings of clubs or organizations: Not on file     Relationship status: Not on file   • Intimate partner violence     Fear of current or ex partner: Not on file     Emotionally abused: Not on file     Physically abused: Not on file     Forced sexual activity: Not on file   Other Topics Concern   • Not on file   Social History Narrative   • Not on file          Objective:     /62 (BP Location: Right arm, Patient Position: Sitting)   Pulse 95   Temp 36.4 °C (97.6 °F) (Temporal)   Ht 1.524  m (5')   Wt 98.3 kg (216 lb 12.8 oz)   SpO2 95%   BMI 42.34 kg/m²      Physical Exam  Constitutional:       General: She is not in acute distress.     Appearance: Normal appearance. She is well-developed. She is not diaphoretic.   HENT:      Head: Normocephalic and atraumatic.      Nose: Nose normal.   Eyes:      General: No scleral icterus.     Extraocular Movements: Extraocular movements intact.      Conjunctiva/sclera: Conjunctivae normal.      Pupils: Pupils are equal, round, and reactive to light.   Neck:      Musculoskeletal: Normal range of motion and neck supple.   Cardiovascular:      Rate and Rhythm: Normal rate and regular rhythm.      Heart sounds: Normal heart sounds. No friction rub. No gallop.    Pulmonary:      Effort: Pulmonary effort is normal. No respiratory distress.      Breath sounds: Normal breath sounds. No wheezing, rhonchi or rales.   Abdominal:      General: Bowel sounds are normal. There is no distension.      Palpations: Abdomen is soft. There is no mass.      Tenderness: There is no abdominal tenderness. There is no right CVA tenderness, left CVA tenderness or guarding.   Musculoskeletal: Normal range of motion.      Right lower leg: No edema.      Left lower leg: No edema.   Skin:     General: Skin is warm.      Coloration: Skin is not jaundiced or pale.      Findings: No erythema or rash.   Neurological:      Mental Status: She is alert and oriented to person, place, and time.      Cranial Nerves: No cranial nerve deficit.      Coordination: Coordination normal.   Psychiatric:         Mood and Affect: Mood normal.         Behavior: Behavior normal.         Thought Content: Thought content normal.         Judgment: Judgment normal.               Laboratory results reviewed; d/w pt  Lab Results   Component Value Date/Time    CREATININE 0.87 01/28/2021 12:01 PM    POTASSIUM 4.2 01/28/2021 12:01 PM       Assessment/Plan:     1. CKD (chronic kidney disease), stage III ([facility]) -improved to  CKD II       -to monitor      2. Essential hypertension      BP well controlled      Continue current treatment    3. Vitamin D deficiency disease      Vit D level well controlled WNL    4. Anemia, unspecified type      Hb stable -WNL    Recs: continue current treatment, low salt diet, keep well hydrated, avoid NSAID's             F/u in 8 months

## 2021-02-09 ENCOUNTER — HOME CARE VISIT (OUTPATIENT)
Dept: HOME HEALTH SERVICES | Facility: HOME HEALTHCARE | Age: 81
End: 2021-02-09
Payer: MEDICARE

## 2021-02-09 PROCEDURE — G0299 HHS/HOSPICE OF RN EA 15 MIN: HCPCS

## 2021-02-09 ASSESSMENT — FIBROSIS 4 INDEX: FIB4 SCORE: 1.77

## 2021-02-11 ENCOUNTER — HOME CARE VISIT (OUTPATIENT)
Dept: HOME HEALTH SERVICES | Facility: HOME HEALTHCARE | Age: 81
End: 2021-02-11
Payer: MEDICARE

## 2021-02-11 VITALS
SYSTOLIC BLOOD PRESSURE: 110 MMHG | TEMPERATURE: 98.1 F | BODY MASS INDEX: 41.99 KG/M2 | RESPIRATION RATE: 18 BRPM | DIASTOLIC BLOOD PRESSURE: 60 MMHG | OXYGEN SATURATION: 98 % | WEIGHT: 215 LBS | HEART RATE: 64 BPM

## 2021-02-11 PROCEDURE — G0151 HHCP-SERV OF PT,EA 15 MIN: HCPCS

## 2021-02-11 SDOH — ECONOMIC STABILITY: HOUSING INSECURITY: EVIDENCE OF SMOKING MATERIAL: 0

## 2021-02-11 ASSESSMENT — ENCOUNTER SYMPTOMS: MUSCLE WEAKNESS: 1

## 2021-02-12 ENCOUNTER — HOME CARE VISIT (OUTPATIENT)
Dept: HOME HEALTH SERVICES | Facility: HOME HEALTHCARE | Age: 81
End: 2021-02-12
Payer: MEDICARE

## 2021-02-12 PROCEDURE — G0493 RN CARE EA 15 MIN HH/HOSPICE: HCPCS

## 2021-02-12 ASSESSMENT — FIBROSIS 4 INDEX: FIB4 SCORE: 1.77

## 2021-02-13 VITALS
DIASTOLIC BLOOD PRESSURE: 66 MMHG | TEMPERATURE: 99 F | HEART RATE: 70 BPM | OXYGEN SATURATION: 97 % | SYSTOLIC BLOOD PRESSURE: 128 MMHG | RESPIRATION RATE: 18 BRPM

## 2021-02-14 VITALS
DIASTOLIC BLOOD PRESSURE: 62 MMHG | SYSTOLIC BLOOD PRESSURE: 105 MMHG | WEIGHT: 211.8 LBS | HEART RATE: 75 BPM | OXYGEN SATURATION: 98 % | TEMPERATURE: 97.6 F | BODY MASS INDEX: 41.36 KG/M2 | RESPIRATION RATE: 17 BRPM

## 2021-02-14 SDOH — ECONOMIC STABILITY: HOUSING INSECURITY: EVIDENCE OF SMOKING MATERIAL: 0

## 2021-02-14 ASSESSMENT — ACTIVITIES OF DAILY LIVING (ADL)
OASIS_M1830: 01
HOME_HEALTH_OASIS: 00

## 2021-02-14 ASSESSMENT — PATIENT HEALTH QUESTIONNAIRE - PHQ9: CLINICAL INTERPRETATION OF PHQ2 SCORE: 0

## 2021-02-19 ENCOUNTER — IMMUNIZATION (OUTPATIENT)
Dept: FAMILY PLANNING/WOMEN'S HEALTH CLINIC | Facility: IMMUNIZATION CENTER | Age: 81
End: 2021-02-19
Attending: INTERNAL MEDICINE
Payer: MEDICARE

## 2021-02-19 DIAGNOSIS — Z23 ENCOUNTER FOR VACCINATION: Primary | ICD-10-CM

## 2021-02-19 PROCEDURE — 0002A PFIZER SARS-COV-2 VACCINE: CPT | Performed by: INTERNAL MEDICINE

## 2021-02-19 PROCEDURE — 91300 PFIZER SARS-COV-2 VACCINE: CPT | Performed by: INTERNAL MEDICINE

## 2021-06-18 ENCOUNTER — TELEPHONE (OUTPATIENT)
Dept: MEDICAL GROUP | Facility: PHYSICIAN GROUP | Age: 81
End: 2021-06-18

## 2021-06-18 NOTE — TELEPHONE ENCOUNTER
Called patient and left voicemail advising Patient to call back to schedule annual wellness visit.

## 2021-06-18 NOTE — PROGRESS NOTES
1. Attempt #: Final    2. [facility] Verified: yes    3. Verify PCP: yes    4. Review Care Team: yes    5.Reviewed/Updated the following with patient:       •   Communication Preference Obtained? YES       •   Preferred Pharmacy? YES       •   Preferred Lab? YES       •   Family History (document living status of immediate family members and if + hx of cancer, diabetes, hypertension, hyperlipidemia, heart attack, stroke) NO    7. Annual Wellness Visit Scheduling //  Pt declined Comprehensive Geriatric Assessment, for now.  · Scheduling Status:Scheduled     8. Care Gap Scheduling (Attempt to Schedule EACH Overdue Care Gap!)     Health Maintenance Due   Topic Date Due   • Annual Wellness Visit  06/04/2020        9. Picatic Rasheeda: no    10. Patient was advised: “This is a free wellness visit. The provider will screen for medical conditions to help you stay healthy. If you have other concerns to address you may be asked to discuss these at a separate visit or there may be an additional fee.”     11. Patient was informed to arrive 15 min prior to their scheduled appointment and bring in their medication bottles.

## 2021-06-25 ENCOUNTER — OFFICE VISIT (OUTPATIENT)
Dept: MEDICAL GROUP | Facility: PHYSICIAN GROUP | Age: 81
End: 2021-06-25
Payer: MEDICARE

## 2021-06-25 VITALS
HEIGHT: 60 IN | DIASTOLIC BLOOD PRESSURE: 72 MMHG | SYSTOLIC BLOOD PRESSURE: 126 MMHG | OXYGEN SATURATION: 96 % | TEMPERATURE: 98.5 F | HEART RATE: 79 BPM | WEIGHT: 224 LBS | BODY MASS INDEX: 43.98 KG/M2

## 2021-06-25 DIAGNOSIS — Z00.00 MEDICARE ANNUAL WELLNESS VISIT, SUBSEQUENT: Primary | ICD-10-CM

## 2021-06-25 DIAGNOSIS — M54.50 CHRONIC BILATERAL LOW BACK PAIN WITHOUT SCIATICA: ICD-10-CM

## 2021-06-25 DIAGNOSIS — Z12.31 ENCOUNTER FOR SCREENING MAMMOGRAM FOR MALIGNANT NEOPLASM OF BREAST: ICD-10-CM

## 2021-06-25 DIAGNOSIS — H40.53X0 GLAUCOMA OF BOTH EYES SECONDARY TO OTHER EYE DISORDER, UNSPECIFIED GLAUCOMA STAGE: ICD-10-CM

## 2021-06-25 DIAGNOSIS — E66.01 SEVERE OBESITY (BMI >= 40) (HCC): ICD-10-CM

## 2021-06-25 DIAGNOSIS — E03.9 ACQUIRED HYPOTHYROIDISM: ICD-10-CM

## 2021-06-25 DIAGNOSIS — I10 ESSENTIAL HYPERTENSION: ICD-10-CM

## 2021-06-25 DIAGNOSIS — G89.29 CHRONIC BILATERAL LOW BACK PAIN WITHOUT SCIATICA: ICD-10-CM

## 2021-06-25 DIAGNOSIS — I70.0 ATHEROSCLEROSIS OF AORTA (HCC): ICD-10-CM

## 2021-06-25 DIAGNOSIS — N18.31 STAGE 3A CHRONIC KIDNEY DISEASE: ICD-10-CM

## 2021-06-25 PROBLEM — I95.9 HYPOTENSION: Status: RESOLVED | Noted: 2020-11-16 | Resolved: 2021-06-25

## 2021-06-25 PROBLEM — U07.1 PNEUMONIA DUE TO COVID-19 VIRUS: Status: RESOLVED | Noted: 2020-11-16 | Resolved: 2021-06-25

## 2021-06-25 PROBLEM — R74.8 ELEVATED LIVER ENZYMES: Status: RESOLVED | Noted: 2020-11-16 | Resolved: 2021-06-25

## 2021-06-25 PROBLEM — J12.82 PNEUMONIA DUE TO COVID-19 VIRUS: Status: RESOLVED | Noted: 2020-11-16 | Resolved: 2021-06-25

## 2021-06-25 PROBLEM — E87.1 HYPONATREMIA: Status: RESOLVED | Noted: 2020-11-16 | Resolved: 2021-06-25

## 2021-06-25 PROCEDURE — G0439 PPPS, SUBSEQ VISIT: HCPCS | Performed by: NURSE PRACTITIONER

## 2021-06-25 ASSESSMENT — PATIENT HEALTH QUESTIONNAIRE - PHQ9: CLINICAL INTERPRETATION OF PHQ2 SCORE: 0

## 2021-06-25 ASSESSMENT — ACTIVITIES OF DAILY LIVING (ADL): BATHING_REQUIRES_ASSISTANCE: 0

## 2021-06-25 ASSESSMENT — ENCOUNTER SYMPTOMS: GENERAL WELL-BEING: GOOD

## 2021-06-25 ASSESSMENT — FIBROSIS 4 INDEX: FIB4 SCORE: 1.77

## 2021-06-25 NOTE — PROGRESS NOTES
Chief Complaint   Patient presents with   • Annual Wellness Visit         HPI:  Jammie is a 80 y.o. here for Medicare Annual Wellness Visit     Overall she is doing quite well.  When she last presented was experiencing peripheral edema and shortness of breath post COVID-19 infection and subsequent hospitalization.  She has returned to full function, no longer experiencing any episodes of breathlessness with routine activity.    Patient Active Problem List    Diagnosis Date Noted   • Atherosclerosis of aorta ([facility]) 06/25/2021   • Chronic bilateral low back pain without sciatica 05/14/2020   • Severe obesity (BMI >= 40) (Former[facility]) 05/24/2018   • Glaucoma of both eyes 05/24/2018   • CKD (chronic kidney disease), stage III ([facility]) 09/04/2015   • Essential hypertension 09/04/2015   • Acquired hypothyroidism 09/03/2015       Current Outpatient Medications   Medication Sig Dispense Refill   • zinc sulfate (ZINCATE) 220 (50 Zn) MG Cap Take 220 mg by mouth every day.     • Ascorbic Acid (VITAMIN C) 1000 MG Tab Take  by mouth.     • Home Care Oxygen Inhale 2 L/min continuous. Oxygen dose range: 1.5-4 L/minute  Respiratory route via: Nasal Cannula   Oxygen supplier: Preferred  2L/minute at rest; may increase O2 to 4L/minute when ambulating or upon exertion.      • acetaminophen (TYLENOL) 325 MG Tab Take 2 Tabs by mouth every 6 hours as needed (Mild Pain; (Pain scale 1-3); Temp greater than 100.5 F). 30 Tab 0   • benzocaine-menthol (CEPACOL) 15-3.6 MG Lozenge Dissolve 1 Lozenge in the mouth every 2 hours as needed.     • atenolol (TENORMIN) 25 MG Tab Take 1 Tab by mouth every day. 90 Tab 3   • levothyroxine (SYNTHROID) 50 MCG Tab Take 1 Tab by mouth every day. 90 Tab 3   • telmisartan (MICARDIS) 40 MG Tab Take 1 Tab by mouth every day. 100 Tab 3   • cyanocobalamin (VITAMIN B-12) 500 MCG Tab Take 500 mcg by mouth every day.     • Cholecalciferol (VITAMIN D-3) 25 MCG (1000 UT) Cap Take 1,000 Units by mouth every day.     • therapeutic  multivitamin-minerals (THERAGRAN-M) TABS Take 1 Tab by mouth every morning.       No current facility-administered medications for this visit.        Patient is taking medications as noted in medication list.  Current supplements as per medication list.     Allergies: Patient has no known allergies.    Current social contact/activities: goes out for dinners      Is patient current with immunizations? Yes.    She  reports that she quit smoking about 44 years ago. Her smoking use included cigarettes. She has a 3.00 pack-year smoking history. She has never used smokeless tobacco. She reports that she does not drink alcohol and does not use drugs.  Counseling given: Not Answered  Comment: Started smoking at age 25 (social smoker)        DPA/Advanced directive: Patient has Advanced Directive on file.     ROS:    Gait: Uses no assistive device   Ostomy: No   Other tubes: No   Amputations: No   Chronic oxygen use No   Last eye exam 2019   Wears hearing aids: No   : Denies any urinary leakage during the last 6 months      Screening:        Depression Screening    Little interest or pleasure in doing things?  0 - not at all  Feeling down, depressed, or hopeless? 0 - not at all  Patient Health Questionnaire Score: 0    If depressive symptoms identified deferred to follow up visit unless specifically addressed in assessment and plan.    Interpretation of PHQ-9 Total Score   Score Severity   1-4 No Depression   5-9 Mild Depression   10-14 Moderate Depression   15-19 Moderately Severe Depression   20-27 Severe Depression    Screening for Cognitive Impairment    Three Minute Recall (captain, garden, picture)  3/3    Bryan clock face with all 12 numbers and set the hands to show 5 past 8.  Yes    If cognitive concerns identified, deferred for follow up unless specifically addressed in assessment and plan.    Fall Risk Assessment    Has the patient had two or more falls in the last year or any fall with injury in the last year?   No  If fall risk identified, deferred for follow up unless specifically addressed in assessment and plan.    Safety Assessment    Throw rugs on floor.  Yes  Handrails on all stairs.  Yes  Good lighting in all hallways.  Yes  Difficulty hearing.  No  Patient counseled about all safety risks that were identified.    Functional Assessment ADLs    Are there any barriers preventing you from cooking for yourself or meeting nutritional needs?  No.    Are there any barriers preventing you from driving safely or obtaining transportation?  No.    Are there any barriers preventing you from using a telephone or calling for help?  No.    Are there any barriers preventing you from shopping?  No.    Are there any barriers preventing you from taking care of your own finances?  No.    Are there any barriers preventing you from managing your medications?  No.    Are there any barriers preventing you from showering, bathing or dressing yourself?  No.    Are you currently engaging in any exercise or physical activity?  Yes.  bicycle  What is your perception of your health?  Good.    Health Maintenance Summary                MAMMOGRAM Next Due 7/24/2021      Done 7/24/2020 MA-SCREENING MAMMO BILAT W/TOMOSYNTHESIS W/CAD     Patient has more history with this topic...    Annual Wellness Visit Next Due 6/26/2022      Done 6/25/2021 Visit Dx: Medicare annual wellness visit, subsequent     Patient has more history with this topic...    BONE DENSITY Next Due 7/23/2024      Done 7/23/2019 DS-BONE DENSITY STUDY (DEXA)     Patient has more history with this topic...    COLONOSCOPY Next Due 10/12/2025      Done 10/12/2015 REFERRAL TO GI FOR COLONOSCOPY    IMM DTaP/Tdap/Td Vaccine Next Due 8/29/2026      Done 8/29/2016 Imm Admin: Tdap Vaccine          Patient Care Team:  NORA Iyer as PCP - General (Nurse Practitioner Family)  She Mae M.D. as Consulting Physician (Nephrology)  Marie Barragan C.N.A. (Inactive) as Senior  [facility]   [facility] as Home Health Provider  JORGE SnadsNJORGE SmithN.NAVI Callahan C.N.A.    Social History     Tobacco Use   • Smoking status: Former Smoker     Packs/day: 0.25     Years: 12.00     Pack years: 3.00     Types: Cigarettes     Quit date: 1977     Years since quittin.5   • Smokeless tobacco: Never Used   • Tobacco comment: Started smoking at age 25 (social smoker)   Vaping Use   • Vaping Use: Never used   Substance Use Topics   • Alcohol use: No     Alcohol/week: 0.0 oz   • Drug use: No     Family History   Problem Relation Age of Onset   • Heart Attack Mother    • Alcohol abuse Mother         etoh   • Lung Disease Father         smoker   • Alcohol abuse Father         etoh   • Heart Attack Father    • Hypertension Sister    • Hyperlipidemia Sister    • Lung Disease Sister         smoker   • Colon Cancer Sister    • Lung Disease Maternal Aunt    • Heart Disease Maternal Aunt    • Hypertension Maternal Aunt    • Hyperlipidemia Maternal Aunt    • Stroke Maternal Grandmother    • Other Maternal Grandmother         TB of the brain   • Stroke Maternal Grandfather    • Colon Cancer Paternal Grandmother    • No Known Problems Paternal Grandfather      She  has a past medical history of Abnormal Pap smear of cervix, CKD (chronic kidney disease), stage III (HCC) (2015), Essential hypertension (2015), Other specified hypothyroidism (9/3/2015), and Tuberculosis. She also has no past medical history of Headache(784.0), Ulcer, or Urinary tract infection, site not specified.   Past Surgical History:   Procedure Laterality Date   • TONSILLECTOMY             Exam:     /72 (BP Location: Left arm, Patient Position: Sitting, BP Cuff Size: Adult)   Pulse 79   Temp 36.9 °C (98.5 °F) (Temporal)   Ht 1.524 m (5')   Wt 102 kg (224 lb)   SpO2 96%  Body mass index is 43.75 kg/m².    Hearing excellent.    Dentition good  Alert, oriented in no  acute distress.  Eye contact is good, speech goal directed, affect calm      Assessment and Plan. The following treatment and monitoring plan is recommended:    1. Medicare annual wellness visit, subsequent     2. Acquired hypothyroidism  TSH WITH REFLEX TO FT4    Tolerating current dose of levothyroxine well, updated labs indicated.  She will obtain with nephrology labs later this month   3. Stage 3a chronic kidney disease (HCC)  Comp Metabolic Panel    Stable, diligently avoids nephrotoxins, remains hydrated, and follows with nephrology   4. Atherosclerosis of aorta (HCC)      Seen on imaging, maintains good blood pressure and cholesterol control.   5. Essential hypertension      Well-controlled on current regimen   6. Glaucoma of both eyes secondary to other eye disorder, unspecified glaucoma stage      Provided local eye doctor contact, plans to establish.  Discussed possible referral needed for ophthalmology   7. Severe obesity (BMI >= 40) (HCC)     8. Chronic bilateral low back pain without sciatica     9. Encounter for screening mammogram for malignant neoplasm of breast  MA-SCREENING MAMMO BILAT W/TOMOSYNTHESIS W/CAD         Services suggested: No services needed at this time  Health Care Screening recommendations as per orders if indicated.  Referrals offered: PT/OT/Nutrition counseling/Behavioral Health/Smoking cessation as per orders if indicated.    Discussion today about general wellness and lifestyle habits:    · Prevent falls and reduce trip hazards; Cautioned about securing or removing rugs.  · Have a working fire alarm and carbon monoxide detector;   · Engage in regular physical activity and social activities       Follow-up: Return in about 6 months (around 12/25/2021).

## 2021-07-30 ENCOUNTER — HOSPITAL ENCOUNTER (OUTPATIENT)
Dept: RADIOLOGY | Facility: MEDICAL CENTER | Age: 81
End: 2021-07-30
Attending: NURSE PRACTITIONER
Payer: MEDICARE

## 2021-07-30 DIAGNOSIS — Z12.31 ENCOUNTER FOR SCREENING MAMMOGRAM FOR MALIGNANT NEOPLASM OF BREAST: ICD-10-CM

## 2021-07-30 PROCEDURE — 77063 BREAST TOMOSYNTHESIS BI: CPT

## 2021-08-02 ENCOUNTER — TELEPHONE (OUTPATIENT)
Dept: MEDICAL GROUP | Facility: PHYSICIAN GROUP | Age: 81
End: 2021-08-02

## 2021-08-02 NOTE — TELEPHONE ENCOUNTER
----- Message from Grace Crocker D.O. sent at 7/30/2021  6:30 PM PDT -----  Please call patient let her know mammogram was normal.

## 2021-08-11 ENCOUNTER — PATIENT OUTREACH (OUTPATIENT)
Dept: HEALTH INFORMATION MANAGEMENT | Facility: OTHER | Age: 81
End: 2021-08-11

## 2021-08-11 NOTE — NON-PROVIDER
Member called in to establish care with a new PCP since hers will be leaving. Verified HIPAA. I escalated an appointment and advised the office will call to schedule. She understood. Used Epic and PQ

## 2021-08-12 ENCOUNTER — TELEPHONE (OUTPATIENT)
Dept: MEDICAL GROUP | Facility: PHYSICIAN GROUP | Age: 81
End: 2021-08-12

## 2021-08-12 NOTE — TELEPHONE ENCOUNTER
ESTABLISHED PATIENT PRE-VISIT PLANNING     Patient was contacted to complete PVP.     Note: Patient will not be contacted if there is no indication to call.     1.  Reviewed notes from the last few office visits within the medical group: Yes    2.  If any orders were placed at last visit or intended to be done for this visit (i.e. 6 mos follow-up), do we have Results/Consult Notes?         •  Labs - not due. Pt was advised by provider to complete prior to appointment with Dr. Mae.  Note: If patient appointment is for lab review and patient did not complete labs, check with provider if OK to reschedule patient until labs completed.       •  Imaging - Imaging ordered, completed and results are in chart.       •  Referrals - No referrals were ordered at last office visit.    3. Is this appointment scheduled as a Hospital Follow-Up? No    4.  Immunizations were updated in Epic using Reconcile Outside Information activity? Yes    5.  Patient is due for the following Health Maintenance Topics:   There are no preventive care reminders to display for this patient.    6.  AHA (Pulse8) form printed for Provider? Yes

## 2021-08-19 ENCOUNTER — OFFICE VISIT (OUTPATIENT)
Dept: MEDICAL GROUP | Facility: PHYSICIAN GROUP | Age: 81
End: 2021-08-19
Payer: MEDICARE

## 2021-08-19 VITALS
BODY MASS INDEX: 44.72 KG/M2 | HEART RATE: 69 BPM | OXYGEN SATURATION: 93 % | TEMPERATURE: 98.5 F | SYSTOLIC BLOOD PRESSURE: 130 MMHG | DIASTOLIC BLOOD PRESSURE: 68 MMHG | WEIGHT: 227.8 LBS | HEIGHT: 60 IN

## 2021-08-19 DIAGNOSIS — I70.0 ATHEROSCLEROSIS OF AORTA (HCC): ICD-10-CM

## 2021-08-19 DIAGNOSIS — I10 ESSENTIAL HYPERTENSION: ICD-10-CM

## 2021-08-19 DIAGNOSIS — E03.9 ACQUIRED HYPOTHYROIDISM: ICD-10-CM

## 2021-08-19 DIAGNOSIS — N18.31 STAGE 3A CHRONIC KIDNEY DISEASE: ICD-10-CM

## 2021-08-19 PROCEDURE — 99214 OFFICE O/P EST MOD 30 MIN: CPT | Performed by: STUDENT IN AN ORGANIZED HEALTH CARE EDUCATION/TRAINING PROGRAM

## 2021-08-19 ASSESSMENT — FIBROSIS 4 INDEX: FIB4 SCORE: 1.77

## 2021-08-19 NOTE — PROGRESS NOTES
Subjective:     CC: Establish care    HISTORY OF THE PRESENT ILLNESS: Patient is a 80 y.o. female. This pleasant patient is here today to establish care. His/her prior PCP was MICHELLE Hunt.    Patient has no complaints or concerns today.  She updates me that she is no longer on home oxygen, stopped in April may.  Was on about 5 months after diagnosed with Covid in November of last year.    For her chronic hypertension, she reports her pressures were normal at home.  Denies any side effect to Micardis 40 mg or atenolol 25 mg daily.  She is followed by nephrology for her CKD, her next appointment is in October.  She states that she thought she should avoid statins wonders if nephrology mention that and will discuss at her follow-up  For her hypothyroidism she is doing well on levothyroxine 50 mcg daily and denies any symptoms of hypo-/hyperthyroidism aside from maybe fatigue but only when she does not sleep well.    For her chronic low back pain it is unchanged and Tylenol is effective.    Current Outpatient Medications Ordered in Epic   Medication Sig Dispense Refill   • zinc sulfate (ZINCATE) 220 (50 Zn) MG Cap Take 220 mg by mouth every day.     • Ascorbic Acid (VITAMIN C) 1000 MG Tab Take  by mouth.     • acetaminophen (TYLENOL) 325 MG Tab Take 2 Tabs by mouth every 6 hours as needed (Mild Pain; (Pain scale 1-3); Temp greater than 100.5 F). 30 Tab 0   • atenolol (TENORMIN) 25 MG Tab Take 1 Tab by mouth every day. 90 Tab 3   • levothyroxine (SYNTHROID) 50 MCG Tab Take 1 Tab by mouth every day. 90 Tab 3   • telmisartan (MICARDIS) 40 MG Tab Take 1 Tab by mouth every day. 100 Tab 3   • cyanocobalamin (VITAMIN B-12) 500 MCG Tab Take 500 mcg by mouth every day.     • Cholecalciferol (VITAMIN D-3) 25 MCG (1000 UT) Cap Take 1,000 Units by mouth every day.     • therapeutic multivitamin-minerals (THERAGRAN-M) TABS Take 1 Tab by mouth every morning.       No current [facility]-ordered facility-administered medications on file.      ROS:   Gen: no fevers/chills, no changes in weight  Eyes: no changes in vision  ENT: no sore throat  Pulm: no sob, no cough  CV: no chest pain, no palpitations  GI: no nausea/vomiting, no diarrhea  Skin: no rash  Neuro: no headaches, no numbness/tingling  Heme/Lymph: no easy bruising      Objective:     Exam: /68 (BP Location: Right arm, Patient Position: Sitting, BP Cuff Size: Large adult)   Pulse 69   Temp 36.9 °C (98.5 °F) (Temporal)   Ht 1.524 m (5')   Wt 103 kg (227 lb 12.8 oz)   SpO2 93%  Body mass index is 44.49 kg/m².    General: Well developed, well nourished in no acute distress.  Head: Normocephalic and atraumatic.  Eyes: Conjunctivae and extraocular motions are normal. Pupils are equal, round. No scleral icterus.   Ears:  External ears unremarkable. Tympanic membranes clear and intact.  Nose: Nares patent. No discharge.  Mouth/Throat: Oropharynx is clear and moist. Posterior pharynx without erythema or exudates.  Neck: Supple. Thyroid is not enlarged.  Pulmonary: Clear to ausculation.  Normal effort. No rales, ronchi, or wheezing.  Cardiovascular: Regular rate and rhythm without murmur.  Abdomen: Soft, nontender, nondistended. Normal bowel sounds. No HSM.  Neurologic: No gross/focal deficits. Normal gait.   Lymph: No cervical or supraclavicular lymph nodes are palpable  Skin: Warm and dry.  No obvious lesions.  Musculoskeletal: No extremity cyanosis, clubbing, or edema. No paraspinal tenderness.  Negative straight leg raise bilaterally.  Psych: Normal mood and affect. Alert and oriented x3. Judgment and insight is normal.    Labs: Reviewed from 1/28/2021    Assessment & Plan:   80 y.o. female with the following -    1. Acquired hypothyroidism  This is a chronic condition, well-controlled on 50 mcg Synthroid daily.  Will trend TSH annually or with concerns-continue current medication.  - TSH WITH REFLEX TO FT4; Future    2. Essential hypertension  This is a chronic condition, well  controlled.  Continue current regimen.  - Comp Metabolic Panel; Future  - Lipid Profile; Future    3. Stage 3a chronic kidney disease (HCC)  This is a chronic condition, improved based on labs from January.  Ensure blood pressure well controlled and avoid nephrotoxic medications when able.  - Comp Metabolic Panel; Future  - CBC WITH DIFFERENTIAL; Future  - Lipid Profile; Future  - MICROALBUMIN CREAT RATIO URINE; Future    4. Atherosclerosis of aorta (HCC)  Noted on radiology studies prior.  Reviewed her last lipid panel from January this year.  LDL pretty low at 105.  We did discuss this and that her ASCVD risk is well above 10% and she may benefit from aspirin (unsure of benefit given that she is over 75 for statin).  She would like to discuss this first with her nephrologist which is reasonable.  The 10-year ASCVD risk score ([place] DUANE Jr., et al., 2013) is: 29.4%    Return in about 6 months (around 2/19/2022), or if symptoms worsen or fail to improve.    Please note that this dictation was created using voice recognition software. I have made every reasonable attempt to correct obvious errors, but I expect that there are errors of grammar and possibly content that I did not discover before finalizing the note.

## 2021-08-23 ENCOUNTER — PATIENT OUTREACH (OUTPATIENT)
Dept: HEALTH INFORMATION MANAGEMENT | Facility: OTHER | Age: 81
End: 2021-08-23

## 2021-08-23 NOTE — NON-PROVIDER
Outcome: Declined Comprehensive Health assessment    Inbound call from [facility] to update primary care provider with SCP. [facility] declined Comprehensive Health assessment, not interested. No further needs at this time.     Please transfer to Patient Outreach Team at [phone] when patient returns call.    Attempt # 1

## 2021-10-15 ENCOUNTER — HOSPITAL ENCOUNTER (OUTPATIENT)
Dept: LAB | Facility: MEDICAL CENTER | Age: 81
End: 2021-10-15
Attending: INTERNAL MEDICINE
Payer: MEDICARE

## 2021-10-15 ENCOUNTER — HOSPITAL ENCOUNTER (OUTPATIENT)
Dept: LAB | Facility: MEDICAL CENTER | Age: 81
End: 2021-10-15
Attending: STUDENT IN AN ORGANIZED HEALTH CARE EDUCATION/TRAINING PROGRAM
Payer: MEDICARE

## 2021-10-15 DIAGNOSIS — E03.9 ACQUIRED HYPOTHYROIDISM: ICD-10-CM

## 2021-10-15 DIAGNOSIS — I10 ESSENTIAL HYPERTENSION: ICD-10-CM

## 2021-10-15 DIAGNOSIS — N18.31 STAGE 3A CHRONIC KIDNEY DISEASE: ICD-10-CM

## 2021-10-15 DIAGNOSIS — N18.2 CKD (CHRONIC KIDNEY DISEASE), STAGE II: ICD-10-CM

## 2021-10-15 LAB
25(OH)D3 SERPL-MCNC: 58 NG/ML (ref 30–100)
ALBUMIN SERPL BCP-MCNC: 4 G/DL (ref 3.2–4.9)
ALBUMIN/GLOB SERPL: 1.1 G/DL
ALP SERPL-CCNC: 152 U/L (ref 30–99)
ALT SERPL-CCNC: 10 U/L (ref 2–50)
ANION GAP SERPL CALC-SCNC: 10 MMOL/L (ref 7–16)
ANION GAP SERPL CALC-SCNC: 9 MMOL/L (ref 7–16)
APPEARANCE UR: CLEAR
AST SERPL-CCNC: 17 U/L (ref 12–45)
BACTERIA #/AREA URNS HPF: NEGATIVE /HPF
BASOPHILS # BLD AUTO: 0.9 % (ref 0–1.8)
BASOPHILS # BLD: 0.08 K/UL (ref 0–0.12)
BILIRUB SERPL-MCNC: 0.5 MG/DL (ref 0.1–1.5)
BILIRUB UR QL STRIP.AUTO: NEGATIVE
BUN SERPL-MCNC: 18 MG/DL (ref 8–22)
BUN SERPL-MCNC: 19 MG/DL (ref 8–22)
CALCIUM SERPL-MCNC: 9.5 MG/DL (ref 8.5–10.5)
CALCIUM SERPL-MCNC: 9.5 MG/DL (ref 8.5–10.5)
CHLORIDE SERPL-SCNC: 103 MMOL/L (ref 96–112)
CHLORIDE SERPL-SCNC: 103 MMOL/L (ref 96–112)
CHOLEST SERPL-MCNC: 182 MG/DL (ref 100–199)
CO2 SERPL-SCNC: 25 MMOL/L (ref 20–33)
CO2 SERPL-SCNC: 25 MMOL/L (ref 20–33)
COLOR UR: YELLOW
CREAT SERPL-MCNC: 0.84 MG/DL (ref 0.5–1.4)
CREAT SERPL-MCNC: 0.99 MG/DL (ref 0.5–1.4)
CREAT UR-MCNC: 138.85 MG/DL
CREAT UR-MCNC: 142.11 MG/DL
EOSINOPHIL # BLD AUTO: 0.27 K/UL (ref 0–0.51)
EOSINOPHIL NFR BLD: 2.9 % (ref 0–6.9)
EPI CELLS #/AREA URNS HPF: NORMAL /HPF
ERYTHROCYTE [DISTWIDTH] IN BLOOD BY AUTOMATED COUNT: 47.1 FL (ref 35.9–50)
ERYTHROCYTE [DISTWIDTH] IN BLOOD BY AUTOMATED COUNT: 47.7 FL (ref 35.9–50)
GLOBULIN SER CALC-MCNC: 3.5 G/DL (ref 1.9–3.5)
GLUCOSE SERPL-MCNC: 110 MG/DL (ref 65–99)
GLUCOSE SERPL-MCNC: 111 MG/DL (ref 65–99)
GLUCOSE UR STRIP.AUTO-MCNC: NEGATIVE MG/DL
HCT VFR BLD AUTO: 43.4 % (ref 37–47)
HCT VFR BLD AUTO: 43.6 % (ref 37–47)
HDLC SERPL-MCNC: 59 MG/DL
HGB BLD-MCNC: 13.7 G/DL (ref 12–16)
HGB BLD-MCNC: 14 G/DL (ref 12–16)
HYALINE CASTS #/AREA URNS LPF: NORMAL /LPF
IMM GRANULOCYTES # BLD AUTO: 0.03 K/UL (ref 0–0.11)
IMM GRANULOCYTES NFR BLD AUTO: 0.3 % (ref 0–0.9)
KETONES UR STRIP.AUTO-MCNC: NEGATIVE MG/DL
LDLC SERPL CALC-MCNC: 106 MG/DL
LEUKOCYTE ESTERASE UR QL STRIP.AUTO: ABNORMAL
LYMPHOCYTES # BLD AUTO: 3.21 K/UL (ref 1–4.8)
LYMPHOCYTES NFR BLD: 34.4 % (ref 22–41)
MCH RBC QN AUTO: 29.3 PG (ref 27–33)
MCH RBC QN AUTO: 29.8 PG (ref 27–33)
MCHC RBC AUTO-ENTMCNC: 31.6 G/DL (ref 33.6–35)
MCHC RBC AUTO-ENTMCNC: 32.1 G/DL (ref 33.6–35)
MCV RBC AUTO: 92.8 FL (ref 81.4–97.8)
MCV RBC AUTO: 92.9 FL (ref 81.4–97.8)
MICRO URNS: ABNORMAL
MICROALBUMIN UR-MCNC: <1.2 MG/DL
MICROALBUMIN UR-MCNC: <1.2 MG/DL
MICROALBUMIN/CREAT UR: NORMAL MG/G (ref 0–30)
MICROALBUMIN/CREAT UR: NORMAL MG/G (ref 0–30)
MONOCYTES # BLD AUTO: 0.75 K/UL (ref 0–0.85)
MONOCYTES NFR BLD AUTO: 8 % (ref 0–13.4)
NEUTROPHILS # BLD AUTO: 4.99 K/UL (ref 2–7.15)
NEUTROPHILS NFR BLD: 53.5 % (ref 44–72)
NITRITE UR QL STRIP.AUTO: NEGATIVE
NRBC # BLD AUTO: 0 K/UL
NRBC BLD-RTO: 0 /100 WBC
PH UR STRIP.AUTO: 6.5 [PH] (ref 5–8)
PLATELET # BLD AUTO: 279 K/UL (ref 164–446)
PLATELET # BLD AUTO: 282 K/UL (ref 164–446)
PMV BLD AUTO: 9.5 FL (ref 9–12.9)
PMV BLD AUTO: 9.6 FL (ref 9–12.9)
POTASSIUM SERPL-SCNC: 4.6 MMOL/L (ref 3.6–5.5)
POTASSIUM SERPL-SCNC: 4.6 MMOL/L (ref 3.6–5.5)
PROT SERPL-MCNC: 7.5 G/DL (ref 6–8.2)
PROT UR QL STRIP: NEGATIVE MG/DL
RBC # BLD AUTO: 4.67 M/UL (ref 4.2–5.4)
RBC # BLD AUTO: 4.7 M/UL (ref 4.2–5.4)
RBC # URNS HPF: NORMAL /HPF
RBC UR QL AUTO: NEGATIVE
SODIUM SERPL-SCNC: 137 MMOL/L (ref 135–145)
SODIUM SERPL-SCNC: 138 MMOL/L (ref 135–145)
SP GR UR STRIP.AUTO: 1.02
TRANS CELLS #/AREA URNS HPF: NORMAL /HPF
TRIGL SERPL-MCNC: 85 MG/DL (ref 0–149)
TSH SERPL DL<=0.005 MIU/L-ACNC: 4.32 UIU/ML (ref 0.38–5.33)
UROBILINOGEN UR STRIP.AUTO-MCNC: 0.2 MG/DL
WBC # BLD AUTO: 9.1 K/UL (ref 4.8–10.8)
WBC # BLD AUTO: 9.3 K/UL (ref 4.8–10.8)
WBC #/AREA URNS HPF: NORMAL /HPF

## 2021-10-15 PROCEDURE — 36415 COLL VENOUS BLD VENIPUNCTURE: CPT

## 2021-10-15 PROCEDURE — 82306 VITAMIN D 25 HYDROXY: CPT

## 2021-10-15 PROCEDURE — 84443 ASSAY THYROID STIM HORMONE: CPT

## 2021-10-15 PROCEDURE — 82043 UR ALBUMIN QUANTITATIVE: CPT

## 2021-10-15 PROCEDURE — 80053 COMPREHEN METABOLIC PANEL: CPT

## 2021-10-15 PROCEDURE — 82570 ASSAY OF URINE CREATININE: CPT

## 2021-10-15 PROCEDURE — 82570 ASSAY OF URINE CREATININE: CPT | Mod: 91

## 2021-10-15 PROCEDURE — 85027 COMPLETE CBC AUTOMATED: CPT

## 2021-10-15 PROCEDURE — 80061 LIPID PANEL: CPT

## 2021-10-15 PROCEDURE — 80048 BASIC METABOLIC PNL TOTAL CA: CPT

## 2021-10-15 PROCEDURE — 82043 UR ALBUMIN QUANTITATIVE: CPT | Mod: 91

## 2021-10-15 PROCEDURE — 85025 COMPLETE CBC W/AUTO DIFF WBC: CPT

## 2021-10-15 PROCEDURE — 81001 URINALYSIS AUTO W/SCOPE: CPT

## 2021-10-21 ENCOUNTER — OFFICE VISIT (OUTPATIENT)
Dept: NEPHROLOGY | Facility: MEDICAL CENTER | Age: 81
End: 2021-10-21
Payer: MEDICARE

## 2021-10-21 VITALS
OXYGEN SATURATION: 95 % | HEART RATE: 81 BPM | DIASTOLIC BLOOD PRESSURE: 82 MMHG | SYSTOLIC BLOOD PRESSURE: 116 MMHG | WEIGHT: 232.13 LBS | BODY MASS INDEX: 45.57 KG/M2 | HEIGHT: 60 IN | TEMPERATURE: 97.1 F

## 2021-10-21 DIAGNOSIS — E55.9 VITAMIN D DEFICIENCY DISEASE: ICD-10-CM

## 2021-10-21 DIAGNOSIS — N18.2 CKD (CHRONIC KIDNEY DISEASE), STAGE II: ICD-10-CM

## 2021-10-21 DIAGNOSIS — D64.9 ANEMIA, UNSPECIFIED TYPE: ICD-10-CM

## 2021-10-21 DIAGNOSIS — R80.9 MICROALBUMINURIA: ICD-10-CM

## 2021-10-21 DIAGNOSIS — I10 ESSENTIAL HYPERTENSION: ICD-10-CM

## 2021-10-21 PROCEDURE — 99214 OFFICE O/P EST MOD 30 MIN: CPT | Performed by: INTERNAL MEDICINE

## 2021-10-21 ASSESSMENT — ENCOUNTER SYMPTOMS
WHEEZING: 0
COUGH: 0
PALPITATIONS: 0
NAUSEA: 0
HEMOPTYSIS: 0
ABDOMINAL PAIN: 0
BACK PAIN: 1
WEIGHT LOSS: 0
ORTHOPNEA: 0
SINUS PAIN: 0
MYALGIAS: 0
NECK PAIN: 0
FLANK PAIN: 0
CHILLS: 0
EYES NEGATIVE: 1
SHORTNESS OF BREATH: 0
DIARRHEA: 0
FEVER: 0
VOMITING: 0

## 2021-10-21 ASSESSMENT — FIBROSIS 4 INDEX: FIB4 SCORE: 1.51

## 2021-10-21 NOTE — PROGRESS NOTES
Subjective:      Jammie Berry is a 81 y.o. female who presents with Follow-Up and Chronic Kidney Disease            Chronic Kidney Disease  Pertinent negatives include no abdominal pain, chest pain, chills, congestion, coughing, fever, myalgias, nausea, neck pain or vomiting.     Jammie is coming today for f/u of CKD II, HTN  Doing well , no complaints  No difficulties to urinate  No dysuria/hematuria/flank pain  No edema  CKD II stable at baseline  HTN:BP well controlled, compliant to low Na diet, medications    Review of Systems   Constitutional: Negative for chills, fever, malaise/fatigue and weight loss.   HENT: Negative for congestion, hearing loss and sinus pain.    Eyes: Negative.    Respiratory: Negative for cough, hemoptysis, shortness of breath and wheezing.    Cardiovascular: Negative for chest pain, palpitations, orthopnea and leg swelling.   Gastrointestinal: Negative for abdominal pain, diarrhea, nausea and vomiting.   Genitourinary: Negative for dysuria, flank pain, frequency, hematuria and urgency.   Musculoskeletal: Positive for back pain. Negative for myalgias and neck pain.   Skin: Negative.    All other systems reviewed and are negative.    Past Medical History:   Diagnosis Date   • Abnormal Pap smear of cervix     Dr. 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       Family History   Problem Relation Age of Onset   • Heart Attack Mother    • Alcohol abuse Mother         etoh   • Lung Disease Father         smoker   • Alcohol abuse Father         etoh   • Heart Attack Father    • Hypertension Sister    • Hyperlipidemia Sister    • Lung Disease Sister         smoker   • Colon Cancer Sister    • Lung Disease Maternal Aunt    • Heart Disease Maternal Aunt    • Hypertension Maternal Aunt    •  Hyperlipidemia Maternal Aunt    • Stroke Maternal Grandmother    • Other Maternal Grandmother         TB of the brain   • Stroke Maternal Grandfather    • Colon Cancer Paternal Grandmother    • No Known Problems Paternal Grandfather        Social History     Socioeconomic History   • Marital status:      Spouse name: Not on file   • Number of children: 0   • Years of education: Not on file   • Highest education level: Not on file   Occupational History   • Occupation: retired CNA   Tobacco Use   • Smoking status: Former Smoker     Packs/day: 0.25     Years: 12.00     Pack years: 3.00     Types: Cigarettes     Quit date: 1977     Years since quittin.8   • Smokeless tobacco: Never Used   • Tobacco comment: Started smoking at age 25 (social smoker)   Vaping Use   • Vaping Use: Never used   Substance and Sexual Activity   • Alcohol use: No     Alcohol/week: 0.0 oz   • Drug use: No   • Sexual activity: Never     Partners: Male     Comment: vaginal atrophy-dysparunia   Other Topics Concern   • Not on file   Social History Narrative   • Not on file     Social Determinants of Health     Financial Resource Strain:    • Difficulty of Paying Living Expenses:    Food Insecurity:    • Worried About Running Out of Food in the Last Year:    • Ran Out of Food in the Last Year:    Transportation Needs:    • Lack of Transportation (Medical):    • Lack of Transportation (Non-Medical):    Physical Activity:    • Days of Exercise per Week:    • Minutes of Exercise per Session:    Stress:    • Feeling of Stress :    Social Connections:    • Frequency of Communication with Friends and Family:    • Frequency of Social Gatherings with Friends and Family:    • Attends Sabianist Services:    • Active Member of Clubs or Organizations:    • Attends Club or Organization Meetings:    • Marital Status:    Intimate Partner Violence:    • Fear of Current or Ex-Partner:    • Emotionally Abused:    • Physically Abused:    • Sexually  Abused:           Objective:     /82 (BP Location: Right arm, Patient Position: Sitting, BP Cuff Size: Adult)   Pulse 81   Temp 36.2 °C (97.1 °F) (Temporal)   Ht 1.524 m (5')   Wt 105 kg (232 lb 2 oz)   SpO2 95%   BMI 45.33 kg/m²      Physical Exam  Vitals reviewed.   Constitutional:       General: She is not in acute distress.     Appearance: She is well-developed. She is obese. She is not diaphoretic.   HENT:      Head: Normocephalic and atraumatic.      Nose: Nose normal.      Mouth/Throat:      Mouth: Mucous membranes are moist.      Pharynx: Oropharynx is clear.   Eyes:      General: No scleral icterus.     Extraocular Movements: Extraocular movements intact.      Conjunctiva/sclera: Conjunctivae normal.      Pupils: Pupils are equal, round, and reactive to light.   Cardiovascular:      Rate and Rhythm: Normal rate and regular rhythm.      Pulses: Normal pulses.      Heart sounds: Normal heart sounds. No friction rub. No gallop.    Pulmonary:      Effort: Pulmonary effort is normal. No respiratory distress.      Breath sounds: Normal breath sounds. No wheezing or rales.   Abdominal:      General: Bowel sounds are normal. There is no distension.      Palpations: Abdomen is soft. There is no mass.      Tenderness: There is no abdominal tenderness. There is no right CVA tenderness, left CVA tenderness or guarding.   Musculoskeletal:      Cervical back: Normal range of motion and neck supple.      Right lower leg: No edema.      Left lower leg: No edema.   Skin:     General: Skin is warm.      Coloration: Skin is not jaundiced.      Findings: No erythema or rash.   Neurological:      General: No focal deficit present.      Mental Status: She is alert and oriented to person, place, and time.      Cranial Nerves: No cranial nerve deficit.      Coordination: Coordination normal.   Psychiatric:         Mood and Affect: Mood normal.         Behavior: Behavior normal.         Thought Content: Thought content  normal.         Judgment: Judgment normal.               Laboratory results reviewed; d/w pt  Lab Results   Component Value Date/Time    CREATININE 0.84 10/15/2021 07:52 AM    POTASSIUM 4.6 10/15/2021 07:52 AM       Assessment/Plan:     1.  CKD II       Stable -to monitor      2. Essential hypertension      BP well controlled      Continue current treatment    3. Vitamin D deficiency disease      Vit D level well controlled WNL    4. Anemia, unspecified type      Hb stable -WNL  5. Microalbuminuria:-negative    Recs: continue current treatment, low salt diet, keep well hydrated, avoid NSAID's             F/u in 6 months

## 2021-12-11 DIAGNOSIS — E03.9 ACQUIRED HYPOTHYROIDISM: ICD-10-CM

## 2021-12-13 RX ORDER — LEVOTHYROXINE SODIUM 0.05 MG/1
TABLET ORAL
Qty: 90 TABLET | Refills: 3 | Status: SHIPPED | OUTPATIENT
Start: 2021-12-13 | End: 2022-12-14

## 2021-12-22 DIAGNOSIS — I10 ESSENTIAL HYPERTENSION: ICD-10-CM

## 2021-12-22 RX ORDER — ATENOLOL 25 MG/1
TABLET ORAL
Qty: 90 TABLET | Refills: 3 | Status: SHIPPED | OUTPATIENT
Start: 2021-12-22 | End: 2022-12-14

## 2022-01-02 DIAGNOSIS — N18.32 STAGE 3B CHRONIC KIDNEY DISEASE: ICD-10-CM

## 2022-01-02 DIAGNOSIS — I10 ESSENTIAL HYPERTENSION: ICD-10-CM

## 2022-01-05 DIAGNOSIS — N18.32 STAGE 3B CHRONIC KIDNEY DISEASE: ICD-10-CM

## 2022-01-05 DIAGNOSIS — I10 ESSENTIAL HYPERTENSION: ICD-10-CM

## 2022-01-05 RX ORDER — TELMISARTAN 40 MG/1
TABLET ORAL
Qty: 100 TABLET | Refills: 3 | Status: SHIPPED | OUTPATIENT
Start: 2022-01-05 | End: 2023-03-28

## 2022-01-05 RX ORDER — TELMISARTAN 40 MG/1
40 TABLET ORAL
Qty: 100 TABLET | Refills: 3 | OUTPATIENT
Start: 2022-01-05

## 2022-02-14 ENCOUNTER — TELEPHONE (OUTPATIENT)
Dept: MEDICAL GROUP | Facility: PHYSICIAN GROUP | Age: 82
End: 2022-02-14

## 2022-02-14 NOTE — TELEPHONE ENCOUNTER
ESTABLISHED PATIENT PRE-VISIT PLANNING     Patient was NOT contacted to complete PVP.     Note: Patient will not be contacted if there is no indication to call.     1.  Reviewed notes from the last few office visits within the medical group: Yes    2.  If any orders were placed at last visit or intended to be done for this visit (i.e. 6 mos follow-up), do we have Results/Consult Notes?         •  Labs - Labs ordered, completed on 10/15/21 and results are in chart.  Note: If patient appointment is for lab review and patient did not complete labs, check with provider if OK to reschedule patient until labs completed.       •  Imaging - Imaging was not ordered at last office visit.       •  Referrals - No referrals were ordered at last office visit.    3. Is this appointment scheduled as a Hospital Follow-Up? No    4.  Immunizations were updated in Epic using Reconcile Outside Information activity? Yes    5.  Patient is due for the following Health Maintenance Topics:   There are no preventive care reminders to display for this patient.    6.  AHA (Pulse8) form printed for Provider? Yes

## 2022-02-22 ENCOUNTER — OFFICE VISIT (OUTPATIENT)
Dept: MEDICAL GROUP | Facility: PHYSICIAN GROUP | Age: 82
End: 2022-02-22
Payer: MEDICARE

## 2022-02-22 VITALS
HEART RATE: 81 BPM | TEMPERATURE: 98.2 F | DIASTOLIC BLOOD PRESSURE: 80 MMHG | OXYGEN SATURATION: 97 % | BODY MASS INDEX: 46.33 KG/M2 | WEIGHT: 236 LBS | HEIGHT: 60 IN | SYSTOLIC BLOOD PRESSURE: 124 MMHG

## 2022-02-22 DIAGNOSIS — E78.00 ELEVATED LDL CHOLESTEROL LEVEL: ICD-10-CM

## 2022-02-22 DIAGNOSIS — E66.01 SEVERE OBESITY (BMI >= 40) (HCC): ICD-10-CM

## 2022-02-22 DIAGNOSIS — N18.2 CKD (CHRONIC KIDNEY DISEASE) STAGE 2, GFR 60-89 ML/MIN: ICD-10-CM

## 2022-02-22 DIAGNOSIS — I70.0 ATHEROSCLEROSIS OF AORTA (HCC): ICD-10-CM

## 2022-02-22 DIAGNOSIS — I10 ESSENTIAL HYPERTENSION: ICD-10-CM

## 2022-02-22 PROCEDURE — 99214 OFFICE O/P EST MOD 30 MIN: CPT | Performed by: STUDENT IN AN ORGANIZED HEALTH CARE EDUCATION/TRAINING PROGRAM

## 2022-02-22 RX ORDER — ROSUVASTATIN CALCIUM 10 MG/1
10 TABLET, COATED ORAL EVERY EVENING
Qty: 90 TABLET | Refills: 3 | Status: SHIPPED | OUTPATIENT
Start: 2022-02-22 | End: 2022-12-23

## 2022-02-22 ASSESSMENT — FIBROSIS 4 INDEX: FIB4 SCORE: 1.51

## 2022-02-22 ASSESSMENT — PATIENT HEALTH QUESTIONNAIRE - PHQ9: CLINICAL INTERPRETATION OF PHQ2 SCORE: 0

## 2022-02-22 NOTE — PROGRESS NOTES
Subjective:     Chief Complaint   Patient presents with   • Follow-Up     HPI:   Jammie presents today to follow up.    Patient saw her nephrologist in October.  I reviewed her labs and the specialty note from that visit.  Patient states that she did not talk to her about a cholesterol medicine.  Has previously been instructed to take aspirin but avoiding as it caused burning stomach pain.  She denies any current stomach issues or blood in the stool.  No chest pain or shortness of breath.    Current Outpatient Medications Ordered in Epic   Medication Sig Dispense Refill   • rosuvastatin (CRESTOR) 10 MG Tab Take 1 Tablet by mouth every evening. 90 Tablet 3   • telmisartan (MICARDIS) 40 MG Tab TAKE 1 TABLET BY MOUTH EVERY  Tablet 3   • atenolol (TENORMIN) 25 MG Tab TAKE 1 TABLET BY MOUTH EVERY DAY 90 Tablet 3   • levothyroxine (SYNTHROID) 50 MCG Tab TAKE 1 TABLET BY MOUTH EVERY DAY 90 Tablet 3   • zinc sulfate (ZINCATE) 220 (50 Zn) MG Cap Take 220 mg by mouth every day.     • Ascorbic Acid (VITAMIN C) 1000 MG Tab Take  by mouth.     • acetaminophen (TYLENOL) 325 MG Tab Take 2 Tabs by mouth every 6 hours as needed (Mild Pain; (Pain scale 1-3); Temp greater than 100.5 F). 30 Tab 0   • cyanocobalamin (VITAMIN B-12) 500 MCG Tab Take 500 mcg by mouth every day.     • Cholecalciferol (VITAMIN D-3) 25 MCG (1000 UT) Cap Take 1,000 Units by mouth every day.     • therapeutic multivitamin-minerals (THERAGRAN-M) TABS Take 1 Tab by mouth every morning.       No current [facility]-ordered facility-administered medications on file.     Health Maintenance: Reviewed with patient.    ROS:  Gen: no fevers/chills, some weight gain since last visit  Eyes: no changes in vision  ENT: no sore throat, no hearing loss, no bloody nose  Pulm: no sob, no cough  CV: no chest pain, no palpitations or lower extremity edema  GI: no nausea/vomiting, no diarrhea/abdominal pain or blood in stool  MSk: no myalgias  Skin: no rash  Neuro: no  headaches, no numbness/tingling  Heme/Lymph: no easy bruising    Objective:     Exam:  /80 (BP Location: Right arm, Patient Position: Sitting, BP Cuff Size: Large adult)   Pulse 81   Temp 36.8 °C (98.2 °F) (Temporal)   Ht 1.524 m (5')   Wt 107 kg (236 lb)   SpO2 97%   BMI 46.09 kg/m²  Body mass index is 46.09 kg/m².    Physical Exam:  Constitutional: Well-developed and well-nourished. No acute distress.   Skin: Skin is warm and dry. No rash noted.  Head: Atraumatic without lesions.  Eyes: Conjunctivae and extraocular motions are normal. Pupils are equal, round. No scleral icterus.   Mouth/Throat: Wearing mask.  Neck: Supple, trachea midline.   Cardiovascular: Regular rate and rhythm, S1 and S2 without murmur, rubs, or gallops.  Lungs: Normal inspiratory effort, CTA bilaterally, no wheezes/rhonchi/rales  Extremities: No cyanosis, clubbing, erythema, nor edema.   Neurological: Alert and oriented x 3. No gross/focal deficits.  Psychiatric:  Behavior, mood, and affect are appropriate.    Labs: Reviewed from 10/15/2021  Imaging: CT scan reviewed from 11/16/2020    Assessment & Plan:     81 y.o. female with the following -     1. Atherosclerosis of aorta (HCC)  2. Elevated LDL cholesterol level  We again reviewed these findings and discussed treatment along with prevention of heart attack and stroke.  Her ASCVD risk is elevated above 10%.  She has been unable to tolerate aspirin in the past.  Start low-dose rosuvastatin as below, discussed possible side effects and return precautions.  Patient will call when prior to her annual for labs to be obtained in October.  - rosuvastatin (CRESTOR) 10 MG Tab; Take 1 Tablet by mouth every evening.  Dispense: 90 Tablet; Refill: 3    3. Severe obesity (BMI >= 40) (HCC)  This is a chronic condition, slightly worse from last visit.  Discussed importance of healthy diet especially for cholesterol as well, provided with information regarding lowering cholesterol may also  consider Mediterranean diet as well.  Will trend.  Recommend exercise as tolerated.    4. CKD (chronic kidney disease) stage 2, GFR 60-89 ml/min  This is a chronic condition, stable.  Blood pressure well controlled, continue current regimen.  Follow-up with nephrology as directed.    5. Essential hypertension  This is a chronic condition, well-controlled, no changes to current regimen.    Return in about 8 months (around 10/22/2022) for Annual.    Please note that this dictation was created using voice recognition software. I have made every reasonable attempt to correct obvious errors, but I expect that there are errors of grammar and possibly content that I did not discover before finalizing the note.

## 2022-02-22 NOTE — PATIENT INSTRUCTIONS
Call in to request annual labs prior to October. Fasting about a week before.    Rosuvastatin Tablets  What is this medicine?  ROSUVASTATIN (don VIKA tracey sta tin) is known as a HMG-CoA reductase inhibitor or 'statin'. It lowers cholesterol and triglycerides in the blood. This drug may also reduce the risk of heart attack, stroke, or other health problems in patients with risk factors for heart disease. Diet and lifestyle changes are often used with this drug.  This medicine may be used for other purposes; ask your health care provider or pharmacist if you have questions.  COMMON BRAND NAME(S): Crestor  What should I tell my health care provider before I take this medicine?  They need to know if you have any of these conditions:  · diabetes  · if you often drink alcohol  · history of stroke  · kidney disease  · liver disease  · muscle aches or weakness  · thyroid disease  · an unusual or allergic reaction to rosuvastatin, other medicines, foods, dyes, or preservatives  · pregnant or trying to get pregnant  · breast-feeding  How should I use this medicine?  Take this medicine by mouth with a glass of water. Follow the directions on the prescription label. Do not cut, crush or chew this medicine. You can take this medicine with or without food. Take your doses at regular intervals. Do not take your medicine more often than directed.  Talk to your pediatrician regarding the use of this medicine in children. While this drug may be prescribed for children as young as 7 years old for selected conditions, precautions do apply.  Overdosage: If you think you have taken too much of this medicine contact a poison control center or emergency room at once.  NOTE: This medicine is only for you. Do not share this medicine with others.  What if I miss a dose?  If you miss a dose, take it as soon as you can. If your next dose is to be taken in less than 12 hours, then do not take the missed dose. Take the next dose at your regular  time. Do not take double or extra doses.  What may interact with this medicine?  Do not take this medicine with any of the following medications:  · herbal medicines like red yeast rice  This medicine may also interact with the following medications:  · alcohol  · antacids containing aluminum hydroxide or magnesium hydroxide  · cyclosporine  · other medicines for high cholesterol  · some medicines for HIV infection  · warfarin  This list may not describe all possible interactions. Give your health care provider a list of all the medicines, herbs, non-prescription drugs, or dietary supplements you use. Also tell them if you smoke, drink alcohol, or use illegal drugs. Some items may interact with your medicine.  What should I watch for while using this medicine?  Visit your doctor or health care professional for regular check-ups. You may need regular tests to make sure your liver is working properly.  Your health care professional may tell you to stop taking this medicine if you develop muscle problems. If your muscle problems do not go away after stopping this medicine, contact your health care professional.  Do not become pregnant while taking this medicine. Women should inform their health care professional if they wish to become pregnant or think they might be pregnant. There is a potential for serious side effects to an unborn child. Talk to your health care professional or pharmacist for more information. Do not breast-feed an infant while taking this medicine.  This medicine may increase blood sugar. Ask your healthcare provider if changes in diet or medicines are needed if you have diabetes.  If you are going to need surgery or other procedure, tell your doctor that you are using this medicine.  This drug is only part of a total heart-health program. Your doctor or a dietician can suggest a low-cholesterol and low-fat diet to help. Avoid alcohol and smoking, and keep a proper exercise schedule.  This medicine  may cause a decrease in Co-Enzyme Q-10. You should make sure that you get enough Co-Enzyme Q-10 while you are taking this medicine. Discuss the foods you eat and the vitamins you take with your health care professional.  What side effects may I notice from receiving this medicine?  Side effects that you should report to your doctor or health care professional as soon as possible:  · allergic reactions like skin rash, itching or hives, swelling of the face, lips, or tongue  · confusion  · joint pain  · loss of memory  · redness, blistering, peeling or loosening of the skin, including inside the mouth  · signs and symptoms of high blood sugar such as being more thirsty or hungry or having to urinate more than normal. You may also feel very tired or have blurry vision.  · signs and symptoms of muscle injury like dark urine; trouble passing urine or change in the amount of urine; unusually weak or tired; muscle pain or side or back pain  · yellowing of the eyes or skin  Side effects that usually do not require medical attention (report to your doctor or health care professional if they continue or are bothersome):  · constipation  · diarrhea  · dizziness  · gas  · headache  · nausea  · stomach pain  · trouble sleeping  · upset stomach  This list may not describe all possible side effects. Call your doctor for medical advice about side effects. You may report side effects to FDA at 6-406-FDA-2935.  Where should I keep my medicine?  Keep out of the reach of children.  Store at room temperature between 20 and 25 degrees C (68 and 77 degrees F). Keep container tightly closed (protect from moisture). Throw away any unused medicine after the expiration date.  NOTE: This sheet is a summary. It may not cover all possible information. If you have questions about this medicine, talk to your doctor, pharmacist, or health care provider.  © 2020 Elsevier/Gold Standard (2019-10-10 08:25:08)      Preventing High  Cholesterol  Cholesterol is a white, waxy substance similar to fat that the human body needs to help build cells. The liver makes all the cholesterol that a person's body needs. Having high cholesterol (hypercholesterolemia) increases a person's risk for heart disease and stroke. Extra (excess) cholesterol comes from the food the person eats.  High cholesterol can often be prevented with diet and lifestyle changes. If you already have high cholesterol, you can control it with diet and lifestyle changes and with medicine.  How can high cholesterol affect me?  If you have high cholesterol, deposits (plaques) may build up on the walls of your arteries. The arteries are the blood vessels that carry blood away from your heart.  Plaques make the arteries narrower and stiffer. This can limit or block blood flow and cause blood clots to form. Blood clots:  · Are tiny balls of cells that form in your blood.  · Can move to the heart or brain, causing a heart attack or stroke.  Plaques in arteries greatly increase your risk for heart attack and stroke.Making diet and lifestyle changes can reduce your risk for these conditions that may threaten your life.  What can increase my risk?  This condition is more likely to develop in people who:  · Eat foods that are high in saturated fat or cholesterol. Saturated fat is mostly found in:  ? Foods that contain animal fat, such as red meat and some dairy products.  ? Certain fatty foods made from plants, such as tropical oils.  · Are overweight.  · Are not getting enough exercise.  · Have a family history of high cholesterol.  What actions can I take to prevent this?  Nutrition    · Eat less saturated fat.  · Avoid trans fats (partially hydrogenated oils). These are often found in margarine and in some baked goods, fried foods, and snacks bought in packages.  · Avoid precooked or cured meat, such as sausages or meat loaves.  · Avoid foods and drinks that have added sugars.  · Eat more  fruits, vegetables, and whole grains.  · Choose healthy sources of protein, such as fish, poultry, lean cuts of red meat, beans, peas, lentils, and nuts.  · Choose healthy sources of fat, such as:  ? Nuts.  ? Vegetable oils, especially olive oil.  ? Fish that have healthy fats (omega-3 fatty acids), such as mackerel or salmon.  The items listed above may not be a complete list of recommended foods and beverages. Contact a dietitian for more information.  Lifestyle  · Lose weight if you are overweight. Losing 5-10 lb (2.3-4.5 kg) can help prevent or control high cholesterol. It can also lower your risk for diabetes and high blood pressure. Ask your health care provider to help you with a diet and exercise plan to lose weight safely.  · Do not use any products that contain nicotine or tobacco, such as cigarettes, e-cigarettes, and chewing tobacco. If you need help quitting, ask your health care provider.  · Limit your alcohol intake.  ? Do not drink alcohol if:  § Your health care provider tells you not to drink.  § You are pregnant, may be pregnant, or are planning to become pregnant.  ? If you drink alcohol:  § Limit how much you use to:  § 0-1 drink a day for women.  § 0-2 drinks a day for men.  § Be aware of how much alcohol is in your drink. In the [place] one drink equals one 12 oz bottle of beer (355 mL), one 5 oz glass of wine (148 mL), or one 1½ oz glass of hard liquor (44 mL).  Activity    · Get enough exercise. Each week, do at least 150 minutes of exercise that takes a medium level of effort (moderate-intensity exercise).  ? This is exercise that:  § Makes your heart beat faster and makes you breathe harder than usual.  § Allows you to still be able to talk.  ? You could exercise in short sessions several times a day or longer sessions a few times a week. For example, on 5 days each week, you could walk fast or ride your bike 3 times a day for 10 minutes each time.  · Do exercises as told by your health care  provider.  Medicines  · In addition to diet and lifestyle changes, your health care provider may recommend medicines to help lower cholesterol. This may be a medicine to lower the amount of cholesterol your liver makes. You may need medicine if:  ? Diet and lifestyle changes do not lower your cholesterol enough.  ? You have high cholesterol and other risk factors for heart disease or stroke.  · Take over-the-counter and prescription medicines only as told by your health care provider.  General information  · Manage your risk factors for high cholesterol. Talk with your health care provider about all your risk factors and how to lower your risk.  · Manage other conditions that you have, such as diabetes or high blood pressure (hypertension).  · Have blood tests to check your cholesterol levels at regular points in time as told by your health care provider.  · Keep all follow-up visits as told by your health care provider. This is important.  Where to find more information  · American Heart Association: www.heart.org  · National Heart, Lung, and Blood [place]: www.nhlbi.nih.gov  Summary  · High cholesterol increases your risk for heart disease and stroke. By keeping your cholesterol level low, you can reduce your risk for these conditions.  · High cholesterol can often be prevented with diet and lifestyle changes.  · Work with your health care provider to manage your risk factors, and have your blood tested regularly.  This information is not intended to replace advice given to you by your health care provider. Make sure you discuss any questions you have with your health care provider.  Document Released: 01/01/2017 Document Revised: 04/10/2020 Document Reviewed: 08/26/2017  Elsevier Patient Education © 2020 Elsevier Inc.      Mediterranean Diet  A Mediterranean diet refers to food and lifestyle choices that are based on the traditions of countries located on the [place]. This way of eating has been shown  to help prevent certain conditions and improve outcomes for people who have chronic diseases, like kidney disease and heart disease.  What are tips for following this plan?  Lifestyle  · Cook and eat meals together with your family, when possible.  · Drink enough fluid to keep your urine clear or pale yellow.  · Be physically active every day. This includes:  ? Aerobic exercise like running or swimming.  ? Leisure activities like gardening, walking, or housework.  · Get 7-8 hours of sleep each night.  · If recommended by your health care provider, drink red wine in moderation. This means 1 glass a day for nonpregnant women and 2 glasses a day for men. A glass of wine equals 5 oz (150 mL).  Reading food labels    · Check the serving size of packaged foods. For foods such as rice and pasta, the serving size refers to the amount of cooked product, not dry.  · Check the total fat in packaged foods. Avoid foods that have saturated fat or trans fats.  · Check the ingredients list for added sugars, such as corn syrup.  Shopping  · At the grocery store, buy most of your food from the areas near the walls of the store. This includes:  ? Fresh fruits and vegetables (produce).  ? Grains, beans, nuts, and seeds. Some of these may be available in unpackaged forms or large amounts (in bulk).  ? Fresh seafood.  ? Poultry and eggs.  ? Low-fat dairy products.  · Buy whole ingredients instead of prepackaged foods.  · Buy fresh fruits and vegetables in-season from local farmers markets.  · Buy frozen fruits and vegetables in resealable bags.  · If you do not have access to quality fresh seafood, buy precooked frozen shrimp or canned fish, such as tuna, salmon, or sardines.  · Buy small amounts of raw or cooked vegetables, salads, or olives from the deli or salad bar at your store.  · Stock your pantry so you always have certain foods on hand, such as olive oil, canned tuna, canned tomatoes, rice, pasta, and beans.  Cooking  · Cook  foods with extra-virgin olive oil instead of using butter or other vegetable oils.  · Have meat as a side dish, and have vegetables or grains as your main dish. This means having meat in small portions or adding small amounts of meat to foods like pasta or stew.  · Use beans or vegetables instead of meat in common dishes like chili or lasagna.  · [place] with different cooking methods. Try roasting or broiling vegetables instead of steaming or sautéeing them.  · Add frozen vegetables to soups, stews, pasta, or rice.  · Add nuts or seeds for added healthy fat at each meal. You can add these to yogurt, salads, or vegetable dishes.  · Marinate fish or vegetables using olive oil, lemon juice, garlic, and fresh herbs.  Meal planning    · Plan to eat 1 vegetarian meal one day each week. Try to work up to 2 vegetarian meals, if possible.  · Eat seafood 2 or more times a week.  · Have healthy snacks readily available, such as:  ? Vegetable sticks with hummus.  ? Greek yogurt.  ? Fruit and nut trail mix.  · Eat balanced meals throughout the week. This includes:  ? Fruit: 2-3 servings a day  ? Vegetables: 4-5 servings a day  ? Low-fat dairy: 2 servings a day  ? Fish, poultry, or lean meat: 1 serving a day  ? Beans and legumes: 2 or more servings a week  ? Nuts and seeds: 1-2 servings a day  ? Whole grains: 6-8 servings a day  ? Extra-virgin olive oil: 3-4 servings a day  · Limit red meat and sweets to only a few servings a month  What are my food choices?  · Mediterranean diet  ? Recommended  § Grains: Whole-grain pasta. Brown rice. Bulgar wheat. Polenta. Couscous. Whole-wheat bread. Oatmeal. Quinoa.  § Vegetables: Artichokes. Beets. Broccoli. Cabbage. Carrots. Eggplant. Green beans. Chard. Kale. Spinach. Onions. Leeks. Peas. Squash. Tomatoes. Peppers. Radishes.  § Fruits: Apples. Apricots. Avocado. Berries. Bananas. Cherries. Dates. Figs. Grapes. Delfino. Melon. Oranges. Peaches. Plums. Pomegranate.  § Meats and other  protein foods: Beans. Almonds. Sunflower seeds. Pine nuts. Peanuts. Cod. [place]. Scallops. Shrimp. Tuna. Tilapia. Clams. Oysters. Eggs.  § Dairy: Low-fat milk. Cheese. Greek yogurt.  § Beverages: Water. Red wine. Herbal tea.  § Fats and oils: Extra virgin olive oil. Avocado oil. Grape seed oil.  § Sweets and desserts: Greek yogurt with honey. Baked apples. Poached pears. Trail mix.  § Seasoning and other foods: Basil. Cilantro. Coriander. Cumin. Mint. Parsley. Theo. Rosemary. Tarragon. Garlic. Oregano. Thyme. Pepper. Balsalmic vinegar. Tahini. Hummus. Tomato sauce. Olives. Mushrooms.  ? Limit these  § Grains: Prepackaged pasta or rice dishes. Prepackaged cereal with added sugar.  § Vegetables: Deep fried potatoes (french fries).  § Fruits: Fruit canned in syrup.  § Meats and other protein foods: Beef. Pork. Lamb. Poultry with skin. Hot dogs. Page.  § Dairy: Ice cream. Sour cream. Whole milk.  § Beverages: Juice. Sugar-sweetened soft drinks. Beer. Liquor and spirits.  § Fats and oils: Butter. Canola oil. Vegetable oil. Beef fat (tallow). Lard.  § Sweets and desserts: Cookies. Cakes. Pies. Candy.  § Seasoning and other foods: Mayonnaise. Premade sauces and marinades.  The items listed may not be a complete list. Talk with your dietitian about what dietary choices are right for you.  Summary  · The Mediterranean diet includes both food and lifestyle choices.  · Eat a variety of fresh fruits and vegetables, beans, nuts, seeds, and whole grains.  · Limit the amount of red meat and sweets that you eat.  · Talk with your health care provider about whether it is safe for you to drink red wine in moderation. This means 1 glass a day for nonpregnant women and 2 glasses a day for men. A glass of wine equals 5 oz (150 mL).  This information is not intended to replace advice given to you by your health care provider. Make sure you discuss any questions you have with your health care provider.  Document Released: 08/10/2017  Document Revised: 08/17/2017 Document Reviewed: 08/10/2017  Elsevier Patient Education © 2020 Elsevier Inc.

## 2022-03-16 ENCOUNTER — TELEPHONE (OUTPATIENT)
Dept: HEALTH INFORMATION MANAGEMENT | Facility: OTHER | Age: 82
End: 2022-03-16
Payer: MEDICARE

## 2022-03-30 PROBLEM — E78.5 DYSLIPIDEMIA: Status: ACTIVE | Noted: 2022-03-30

## 2022-03-30 PROBLEM — I73.9 PVD (PERIPHERAL VASCULAR DISEASE) (HCC): Status: ACTIVE | Noted: 2022-03-30

## 2022-03-31 PROBLEM — E66.01 MORBID OBESITY (HCC): Status: ACTIVE | Noted: 2022-03-31

## 2022-04-06 ENCOUNTER — HOSPITAL ENCOUNTER (OUTPATIENT)
Dept: LAB | Facility: MEDICAL CENTER | Age: 82
End: 2022-04-06
Attending: INTERNAL MEDICINE
Payer: MEDICARE

## 2022-04-06 DIAGNOSIS — R80.9 MICROALBUMINURIA: ICD-10-CM

## 2022-04-06 DIAGNOSIS — N18.2 CKD (CHRONIC KIDNEY DISEASE), STAGE II: ICD-10-CM

## 2022-04-06 DIAGNOSIS — D64.9 ANEMIA, UNSPECIFIED TYPE: ICD-10-CM

## 2022-04-06 DIAGNOSIS — E55.9 VITAMIN D DEFICIENCY DISEASE: ICD-10-CM

## 2022-04-06 LAB
25(OH)D3 SERPL-MCNC: 59 NG/ML (ref 30–100)
ANION GAP SERPL CALC-SCNC: 14 MMOL/L (ref 7–16)
APPEARANCE UR: CLEAR
BACTERIA #/AREA URNS HPF: NEGATIVE /HPF
BILIRUB UR QL STRIP.AUTO: NEGATIVE
BUN SERPL-MCNC: 18 MG/DL (ref 8–22)
CALCIUM SERPL-MCNC: 9.5 MG/DL (ref 8.5–10.5)
CHLORIDE SERPL-SCNC: 103 MMOL/L (ref 96–112)
CO2 SERPL-SCNC: 21 MMOL/L (ref 20–33)
COLOR UR: YELLOW
CREAT SERPL-MCNC: 0.99 MG/DL (ref 0.5–1.4)
CREAT UR-MCNC: 126.34 MG/DL
EPI CELLS #/AREA URNS HPF: ABNORMAL /HPF
ERYTHROCYTE [DISTWIDTH] IN BLOOD BY AUTOMATED COUNT: 46.7 FL (ref 35.9–50)
GFR SERPLBLD CREATININE-BSD FMLA CKD-EPI: 58 ML/MIN/1.73 M 2
GLUCOSE SERPL-MCNC: 107 MG/DL (ref 65–99)
GLUCOSE UR STRIP.AUTO-MCNC: NEGATIVE MG/DL
HCT VFR BLD AUTO: 43 % (ref 37–47)
HGB BLD-MCNC: 13.7 G/DL (ref 12–16)
HYALINE CASTS #/AREA URNS LPF: ABNORMAL /LPF
KETONES UR STRIP.AUTO-MCNC: NEGATIVE MG/DL
LEUKOCYTE ESTERASE UR QL STRIP.AUTO: ABNORMAL
MCH RBC QN AUTO: 29.4 PG (ref 27–33)
MCHC RBC AUTO-ENTMCNC: 31.9 G/DL (ref 33.6–35)
MCV RBC AUTO: 92.3 FL (ref 81.4–97.8)
MICRO URNS: ABNORMAL
MICROALBUMIN UR-MCNC: 1.7 MG/DL
MICROALBUMIN/CREAT UR: 13 MG/G (ref 0–30)
NITRITE UR QL STRIP.AUTO: NEGATIVE
PH UR STRIP.AUTO: 6.5 [PH] (ref 5–8)
PLATELET # BLD AUTO: 270 K/UL (ref 164–446)
PMV BLD AUTO: 9.6 FL (ref 9–12.9)
POTASSIUM SERPL-SCNC: 4.1 MMOL/L (ref 3.6–5.5)
PROT UR QL STRIP: 30 MG/DL
PTH-INTACT SERPL-MCNC: 46.1 PG/ML (ref 14–72)
RBC # BLD AUTO: 4.66 M/UL (ref 4.2–5.4)
RBC # URNS HPF: ABNORMAL /HPF
RBC UR QL AUTO: NEGATIVE
RENAL EPI CELLS #/AREA URNS HPF: ABNORMAL /HPF
SODIUM SERPL-SCNC: 138 MMOL/L (ref 135–145)
SP GR UR STRIP.AUTO: 1.02
UROBILINOGEN UR STRIP.AUTO-MCNC: 0.2 MG/DL
WBC # BLD AUTO: 9.8 K/UL (ref 4.8–10.8)
WBC #/AREA URNS HPF: ABNORMAL /HPF

## 2022-04-06 PROCEDURE — 82570 ASSAY OF URINE CREATININE: CPT

## 2022-04-06 PROCEDURE — 81001 URINALYSIS AUTO W/SCOPE: CPT

## 2022-04-06 PROCEDURE — 85027 COMPLETE CBC AUTOMATED: CPT

## 2022-04-06 PROCEDURE — 83970 ASSAY OF PARATHORMONE: CPT

## 2022-04-06 PROCEDURE — 82043 UR ALBUMIN QUANTITATIVE: CPT

## 2022-04-06 PROCEDURE — 82306 VITAMIN D 25 HYDROXY: CPT

## 2022-04-06 PROCEDURE — 80048 BASIC METABOLIC PNL TOTAL CA: CPT

## 2022-04-14 ENCOUNTER — OFFICE VISIT (OUTPATIENT)
Dept: NEPHROLOGY | Facility: MEDICAL CENTER | Age: 82
End: 2022-04-14
Payer: MEDICARE

## 2022-04-14 VITALS
DIASTOLIC BLOOD PRESSURE: 74 MMHG | BODY MASS INDEX: 46.53 KG/M2 | OXYGEN SATURATION: 93 % | WEIGHT: 237 LBS | SYSTOLIC BLOOD PRESSURE: 120 MMHG | HEIGHT: 60 IN | TEMPERATURE: 98.2 F | HEART RATE: 84 BPM

## 2022-04-14 DIAGNOSIS — I10 ESSENTIAL HYPERTENSION: ICD-10-CM

## 2022-04-14 DIAGNOSIS — E55.9 VITAMIN D DEFICIENCY DISEASE: ICD-10-CM

## 2022-04-14 DIAGNOSIS — D64.9 ANEMIA, UNSPECIFIED TYPE: ICD-10-CM

## 2022-04-14 DIAGNOSIS — R80.9 MICROALBUMINURIA: ICD-10-CM

## 2022-04-14 DIAGNOSIS — N18.2 CKD (CHRONIC KIDNEY DISEASE), STAGE II: ICD-10-CM

## 2022-04-14 PROCEDURE — 99213 OFFICE O/P EST LOW 20 MIN: CPT | Performed by: INTERNAL MEDICINE

## 2022-04-14 ASSESSMENT — ENCOUNTER SYMPTOMS
ORTHOPNEA: 0
PALPITATIONS: 0
CHILLS: 0
HEMOPTYSIS: 0
NAUSEA: 0
FEVER: 0
WHEEZING: 0
VOMITING: 0
COUGH: 0
SINUS PAIN: 0
DIARRHEA: 0
WEIGHT LOSS: 0
ABDOMINAL PAIN: 0
FLANK PAIN: 0
SHORTNESS OF BREATH: 0
EYES NEGATIVE: 1

## 2022-04-14 ASSESSMENT — FIBROSIS 4 INDEX: FIB4 SCORE: 1.57

## 2022-04-14 NOTE — PROGRESS NOTES
Subjective:      Jammie Berry is a 81 y.o. female who presents with Follow-Up and Chronic Kidney Disease            Chronic Kidney Disease  Pertinent negatives include no abdominal pain, chest pain, chills, congestion, coughing, fever, nausea or vomiting.     Jammie is coming today for f/u of CKD II, HTN  Doing well , no complaints  No difficulties to urinate  No dysuria/hematuria/flank pain  No edema  CKD II stable at baseline  HTN:BP well controlled, compliant to low Na diet, medications    Review of Systems   Constitutional: Negative for chills, fever, malaise/fatigue and weight loss.   HENT: Negative for congestion, hearing loss and sinus pain.    Eyes: Negative.    Respiratory: Negative for cough, hemoptysis, shortness of breath and wheezing.    Cardiovascular: Negative for chest pain, palpitations, orthopnea and leg swelling.   Gastrointestinal: Negative for abdominal pain, diarrhea, nausea and vomiting.   Genitourinary: Negative for dysuria, flank pain, frequency, hematuria and urgency.   Skin: Negative.    All other systems reviewed and are negative.    Past Medical History:   Diagnosis Date   • Abnormal Pap smear of cervix     Dr. 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       Family History   Problem Relation Age of Onset   • Heart Attack Mother    • Alcohol abuse Mother         etoh   • Lung Disease Father         smoker   • Alcohol abuse Father         etoh   • Heart Attack Father    • Hypertension Sister    • Hyperlipidemia Sister    • Lung Disease Sister         smoker   • Colon Cancer Sister    • Lung Disease Maternal Aunt    • Heart Disease Maternal Aunt    • Hypertension Maternal Aunt    • Hyperlipidemia Maternal Aunt    • Stroke Maternal Grandmother    • Other Maternal Grandmother          TB of the brain   • Stroke Maternal Grandfather    • Colon Cancer Paternal Grandmother    • No Known Problems Paternal Grandfather        Social History     Socioeconomic History   • Marital status:    • Number of children: 0   Occupational History   • Occupation: retired CNA   Tobacco Use   • Smoking status: Former Smoker     Packs/day: 0.25     Years: 12.00     Pack years: 3.00     Types: Cigarettes     Quit date: 1977     Years since quittin.3   • Smokeless tobacco: Never Used   • Tobacco comment: Started smoking at age 25 (social smoker)   Vaping Use   • Vaping Use: Never used   Substance and Sexual Activity   • Alcohol use: No     Alcohol/week: 0.0 oz   • Drug use: No   • Sexual activity: Never     Partners: Male     Comment: vaginal atrophy-dysparunia          Objective:     /74 (BP Location: Right arm, Patient Position: Sitting, BP Cuff Size: Large adult)   Pulse 84   Temp 36.8 °C (98.2 °F) (Temporal)   Ht 1.524 m (5')   Wt 108 kg (237 lb)   SpO2 93%   BMI 46.29 kg/m²      Physical Exam  Vitals reviewed.   Constitutional:       General: She is not in acute distress.     Appearance: Normal appearance. She is well-developed. She is obese. She is not diaphoretic.   HENT:      Head: Normocephalic and atraumatic.      Nose: Nose normal.      Mouth/Throat:      Mouth: Mucous membranes are moist.      Pharynx: Oropharynx is clear.   Eyes:      Extraocular Movements: Extraocular movements intact.      Conjunctiva/sclera: Conjunctivae normal.      Pupils: Pupils are equal, round, and reactive to light.   Cardiovascular:      Rate and Rhythm: Normal rate and regular rhythm.      Pulses: Normal pulses.      Heart sounds: Normal heart sounds.     No friction rub. No gallop.   Pulmonary:      Effort: Pulmonary effort is normal. No respiratory distress.      Breath sounds: Normal breath sounds. No wheezing, rhonchi or rales.   Abdominal:      General: Bowel sounds are normal. There is no  distension.      Palpations: Abdomen is soft. There is no mass.      Tenderness: There is no abdominal tenderness. There is no right CVA tenderness, left CVA tenderness or guarding.   Musculoskeletal:      Cervical back: Normal range of motion and neck supple.      Right lower leg: No edema.      Left lower leg: No edema.   Skin:     General: Skin is warm.      Coloration: Skin is not pale.      Findings: No erythema or rash.   Neurological:      General: No focal deficit present.      Mental Status: She is alert and oriented to person, place, and time.      Cranial Nerves: No cranial nerve deficit.      Coordination: Coordination normal.   Psychiatric:         Mood and Affect: Mood normal.         Behavior: Behavior normal.         Thought Content: Thought content normal.         Judgment: Judgment normal.               Laboratory results reviewed; d/w pt  Lab Results   Component Value Date/Time    CREATININE 0.99 04/06/2022 07:50 AM    POTASSIUM 4.1 04/06/2022 07:50 AM       Assessment/Plan:     1.  CKD II       Stable at baseline -to monitor      2. Essential hypertension      BP well controlled      Continue current treatment    3. Vitamin D deficiency disease      Vit D level well controlled WNL    4. Anemia, unspecified type      Hb stable -WNL    5. Microalbuminuria:-negative    Recs: continue current treatment, low salt diet, keep well hydrated, avoid NSAID's             F/u in 6 months

## 2022-09-20 ENCOUNTER — TELEPHONE (OUTPATIENT)
Dept: MEDICAL GROUP | Facility: PHYSICIAN GROUP | Age: 82
End: 2022-09-20
Payer: MEDICARE

## 2022-09-20 DIAGNOSIS — E03.9 ACQUIRED HYPOTHYROIDISM: ICD-10-CM

## 2022-09-20 DIAGNOSIS — E78.5 DYSLIPIDEMIA: ICD-10-CM

## 2022-09-20 NOTE — TELEPHONE ENCOUNTER
Patient would like to know if you would like to add more blood work or if the one she did for Dr. Mae is okay. Please advise, thank you

## 2022-10-11 ENCOUNTER — HOSPITAL ENCOUNTER (OUTPATIENT)
Dept: LAB | Facility: MEDICAL CENTER | Age: 82
End: 2022-10-11
Attending: INTERNAL MEDICINE
Payer: MEDICARE

## 2022-10-11 DIAGNOSIS — E78.5 DYSLIPIDEMIA: ICD-10-CM

## 2022-10-11 DIAGNOSIS — N18.2 CKD (CHRONIC KIDNEY DISEASE), STAGE II: ICD-10-CM

## 2022-10-11 DIAGNOSIS — I10 ESSENTIAL HYPERTENSION: ICD-10-CM

## 2022-10-11 DIAGNOSIS — E03.9 ACQUIRED HYPOTHYROIDISM: ICD-10-CM

## 2022-10-11 LAB
ALBUMIN SERPL BCP-MCNC: 3.6 G/DL (ref 3.2–4.9)
ALP SERPL-CCNC: 140 U/L (ref 30–99)
ALT SERPL-CCNC: 11 U/L (ref 2–50)
ANION GAP SERPL CALC-SCNC: 12 MMOL/L (ref 7–16)
AST SERPL-CCNC: 16 U/L (ref 12–45)
BILIRUB CONJ SERPL-MCNC: <0.2 MG/DL (ref 0.1–0.5)
BILIRUB INDIRECT SERPL-MCNC: ABNORMAL MG/DL (ref 0–1)
BILIRUB SERPL-MCNC: 0.5 MG/DL (ref 0.1–1.5)
BUN SERPL-MCNC: 19 MG/DL (ref 8–22)
CALCIUM SERPL-MCNC: 9.1 MG/DL (ref 8.5–10.5)
CHLORIDE SERPL-SCNC: 104 MMOL/L (ref 96–112)
CHOLEST SERPL-MCNC: 108 MG/DL (ref 100–199)
CO2 SERPL-SCNC: 23 MMOL/L (ref 20–33)
CREAT SERPL-MCNC: 1.06 MG/DL (ref 0.5–1.4)
FASTING STATUS PATIENT QL REPORTED: NORMAL
GFR SERPLBLD CREATININE-BSD FMLA CKD-EPI: 53 ML/MIN/1.73 M 2
GLUCOSE SERPL-MCNC: 109 MG/DL (ref 65–99)
HDLC SERPL-MCNC: 43 MG/DL
LDLC SERPL CALC-MCNC: 43 MG/DL
POTASSIUM SERPL-SCNC: 4.4 MMOL/L (ref 3.6–5.5)
PROT SERPL-MCNC: 6.9 G/DL (ref 6–8.2)
SODIUM SERPL-SCNC: 139 MMOL/L (ref 135–145)
TRIGL SERPL-MCNC: 108 MG/DL (ref 0–149)
TSH SERPL DL<=0.005 MIU/L-ACNC: 2.81 UIU/ML (ref 0.38–5.33)

## 2022-10-11 PROCEDURE — 80076 HEPATIC FUNCTION PANEL: CPT

## 2022-10-11 PROCEDURE — 80048 BASIC METABOLIC PNL TOTAL CA: CPT

## 2022-10-11 PROCEDURE — 84443 ASSAY THYROID STIM HORMONE: CPT

## 2022-10-11 PROCEDURE — 80061 LIPID PANEL: CPT

## 2022-10-18 ENCOUNTER — OFFICE VISIT (OUTPATIENT)
Dept: MEDICAL GROUP | Facility: PHYSICIAN GROUP | Age: 82
End: 2022-10-18
Payer: MEDICARE

## 2022-10-18 VITALS
SYSTOLIC BLOOD PRESSURE: 128 MMHG | TEMPERATURE: 97.8 F | WEIGHT: 240 LBS | HEART RATE: 90 BPM | OXYGEN SATURATION: 95 % | BODY MASS INDEX: 47.12 KG/M2 | DIASTOLIC BLOOD PRESSURE: 68 MMHG | RESPIRATION RATE: 20 BRPM | HEIGHT: 60 IN

## 2022-10-18 DIAGNOSIS — E78.5 DYSLIPIDEMIA: ICD-10-CM

## 2022-10-18 DIAGNOSIS — I73.9 PVD (PERIPHERAL VASCULAR DISEASE) (HCC): ICD-10-CM

## 2022-10-18 DIAGNOSIS — R74.8 ELEVATED ALKALINE PHOSPHATASE LEVEL: ICD-10-CM

## 2022-10-18 DIAGNOSIS — R73.01 IMPAIRED FASTING GLUCOSE: ICD-10-CM

## 2022-10-18 DIAGNOSIS — Z00.00 MEDICARE ANNUAL WELLNESS VISIT, SUBSEQUENT: Primary | ICD-10-CM

## 2022-10-18 DIAGNOSIS — I70.0 ATHEROSCLEROSIS OF AORTA (HCC): ICD-10-CM

## 2022-10-18 DIAGNOSIS — N18.31 STAGE 3A CHRONIC KIDNEY DISEASE: ICD-10-CM

## 2022-10-18 DIAGNOSIS — E03.9 ACQUIRED HYPOTHYROIDISM: ICD-10-CM

## 2022-10-18 PROCEDURE — 99214 OFFICE O/P EST MOD 30 MIN: CPT | Mod: 25 | Performed by: STUDENT IN AN ORGANIZED HEALTH CARE EDUCATION/TRAINING PROGRAM

## 2022-10-18 PROCEDURE — G0439 PPPS, SUBSEQ VISIT: HCPCS | Performed by: STUDENT IN AN ORGANIZED HEALTH CARE EDUCATION/TRAINING PROGRAM

## 2022-10-18 ASSESSMENT — ACTIVITIES OF DAILY LIVING (ADL): BATHING_REQUIRES_ASSISTANCE: 0

## 2022-10-18 ASSESSMENT — FIBROSIS 4 INDEX: FIB4 SCORE: 1.43

## 2022-10-18 ASSESSMENT — ENCOUNTER SYMPTOMS: GENERAL WELL-BEING: GOOD

## 2022-10-18 ASSESSMENT — PATIENT HEALTH QUESTIONNAIRE - PHQ9: CLINICAL INTERPRETATION OF PHQ2 SCORE: 0

## 2022-10-18 NOTE — PROGRESS NOTES
Chief Complaint   Patient presents with    Medicare Annual Wellness     HPI:  Jammie Berry is a 82 y.o. here for Medicare Annual Wellness Visit     Patient Active Problem List    Diagnosis Date Noted    Impaired fasting glucose 10/18/2022    Morbid obesity ([facility]) 03/31/2022    BMI 45.0-49.9, adult ([facility]) 03/30/2022    PVD (peripheral vascular disease) ([facility]) 03/30/2022    Dyslipidemia 03/30/2022    Atherosclerosis of aorta ([facility]) 06/25/2021    Elevated alkaline phosphatase level 11/16/2020    Chronic bilateral low back pain without sciatica 05/14/2020    Severe obesity (BMI >= 40) ([facility]) 05/24/2018    Glaucoma of both eyes 05/24/2018    CKD (chronic kidney disease), stage III ([facility]) 09/04/2015    Essential hypertension 09/04/2015    Acquired hypothyroidism 09/03/2015     Current Outpatient Medications   Medication Sig Dispense Refill    rosuvastatin (CRESTOR) 10 MG Tab Take 1 Tablet by mouth every evening. 90 Tablet 3    telmisartan (MICARDIS) 40 MG Tab TAKE 1 TABLET BY MOUTH EVERY  Tablet 3    atenolol (TENORMIN) 25 MG Tab TAKE 1 TABLET BY MOUTH EVERY DAY 90 Tablet 3    levothyroxine (SYNTHROID) 50 MCG Tab TAKE 1 TABLET BY MOUTH EVERY DAY 90 Tablet 3    zinc sulfate (ZINCATE) 220 (50 Zn) MG Cap Take 220 mg by mouth every day.      Ascorbic Acid (VITAMIN C) 1000 MG Tab Take  by mouth.      acetaminophen (TYLENOL) 325 MG Tab Take 2 Tabs by mouth every 6 hours as needed (Mild Pain; (Pain scale 1-3); Temp greater than 100.5 F). 30 Tab 0    cyanocobalamin (VITAMIN B-12) 500 MCG Tab Take 500 mcg by mouth every day.      Cholecalciferol (VITAMIN D-3) 25 MCG (1000 UT) Cap Take 1,000 Units by mouth every day.      therapeutic multivitamin-minerals (THERAGRAN-M) TABS Take 1 Tab by mouth every morning.       No current facility-administered medications for this visit.          Current supplements as per medication list.     Allergies: Patient has no known allergies.    Current social contact/activities: Goes to  [employer], does cross words, dances in her chair (loves music), does stationary bike, talks to sister daily (lives in [place]). Niece visits her.     She  reports that she quit smoking about 45 years ago. Her smoking use included cigarettes. She has a 3.00 pack-year smoking history. She has never used smokeless tobacco. She reports that she does not drink alcohol and does not use drugs.  Counseling given: Not Answered  Tobacco comments: Started smoking at age 25 (social smoker)    DPA/Advanced Directive:  Patient has Advanced Directive on file.     ROS:    Gait: Uses no assistive device  Ostomy: No  Other tubes: No  Amputations: No  Chronic oxygen use: No  Last eye exam: Pt states 3 years ago   Wears hearing aids: No   : Denies any urinary leakage during the last 6 months    Screening:    Depression Screening  Little interest or pleasure in doing things?  0 - not at all  Feeling down, depressed , or hopeless? 0 - not at all  Patient Health Questionnaire Score: 0     If depressive symptoms identified deferred to follow up visit unless specifically addressed in assessment and plan.    Interpretation of PHQ-9 Total Score   Score Severity   1-4 No Depression   5-9 Mild Depression   10-14 Moderate Depression   15-19 Moderately Severe Depression   20-27 Severe Depression    Screening for Cognitive Impairment  Three Minute Recall (daughter, heaven, mountain) 2/3   Missed mountain (thought it was earth). No memory concerns.   Bryan clock face with all 12 numbers and set the hands to show 10 past 11.  Yes    Cognitive concerns identified deferred for follow up unless specifically addressed in assessment and plan.    Fall Risk Assessment  Has the patient had two or more falls in the last year or any fall with injury in the last year?  No    Safety Assessment  Throw rugs on floor.  Yes, but secure  Handrails on all stairs.  Yes  Good lighting in all hallways.  Yes  Difficulty hearing.  No  Patient counseled about all safety risks  that were identified.    Functional Assessment ADLs  Are there any barriers preventing you from cooking for yourself or meeting nutritional needs?  No.    Are there any barriers preventing you from driving safely or obtaining transportation?  No.    Are there any barriers preventing you from using a telephone or calling for help?  No.    Are there any barriers preventing you from shopping?  No.    Are there any barriers preventing you from taking care of your own finances?  No  Are there any barriers preventing you from managing your medications?  No  Are there any barriers preventing you from showering, bathing or dressing yourself?  No.    Are you currently engaging in any exercise or physical activity?  No.     What is your perception of your health?  Good.    Advance Care Planning  Do you have an Advance Directive, Living Will, Durable Power of , or POLST? Yes.    Health Maintenance Summary            Postponed - MAMMOGRAM (Yearly) Postponed until 7/1/2023 07/30/2021  MA-SCREENING MAMMO BILAT W/TOMOSYNTHESIS W/CAD    07/24/2020  MA-SCREENING MAMMO BILAT W/TOMOSYNTHESIS W/CAD    07/23/2019  MA-SCREENING MAMMO BILAT W/TOMOSYNTHESIS W/CAD    07/19/2018  MA-MAMMO SCREENING BILAT W/JOSEPH W/CAD    07/17/2017  MA-MAMMO SCREENING BILAT W/JOSEPH W/CAD    Only the first 5 history entries have been loaded, but more history exists.              Postponed - IMM HEP B VACCINE (1 of 3 - 3-dose series) Postponed until 10/18/2023      No completion history exists for this topic.              Annual Wellness Visit (Every 366 Days) Next due on 10/19/2023      10/18/2022  Level of Service: ANNUAL WELLNESS VISIT-INCLUDES PPPS SUBSEQUE*    10/18/2022  Visit Dx: Medicare annual wellness visit, subsequent    03/30/2022  Level of Service: AL ANNUAL WELLNESS VISIT-INCLUDES PPPS SUBSEQUE*    06/25/2021  Level of Service: AL ANNUAL WELLNESS VISIT-INCLUDES PPPS SUBSEQUE*    06/25/2021  Visit Dx: Medicare annual wellness visit,  subsequent    Only the first 5 history entries have been loaded, but more history exists.              BONE DENSITY (Every 5 Years) Next due on 7/23/2024 07/23/2019  DS-BONE DENSITY STUDY (DEXA)    07/11/2014  DS-BONE DENSITY STUDY (DEXA)              COLORECTAL CANCER SCREENING (COLONOSCOPY - Every 10 Years) Next due on 10/12/2025      10/12/2015  REFERRAL TO GI FOR COLONOSCOPY    07/15/2015  OCCULT BLOOD FECES IMMUNOASSAY              IMM DTaP/Tdap/Td Vaccine (2 - Td or Tdap) Next due on 8/29/2026 08/29/2016  Imm Admin: Tdap Vaccine              IMM PNEUMOCOCCAL VACCINE: 65+ Years (Series Information) Completed      10/13/2016  Imm Admin: Pneumococcal polysaccharide vaccine (PPSV-23)    10/12/2015  Imm Admin: Pneumococcal Conjugate Vaccine (Prevnar/PCV-13)              IMM ZOSTER VACCINES (Series Information) Completed      10/20/2020  Imm Admin: Zoster Vaccine Recombinant (RZV) (SHINGRIX)    05/14/2020  Imm Admin: Zoster Vaccine Recombinant (RZV) (SHINGRIX)    04/13/2017  Imm Admin: Zoster Vaccine Live (ZVL) (Zostavax) - HISTORICAL DATA              IMM INFLUENZA (Series Information) Completed      10/05/2022  Imm Admin: Influenza Vaccine Adult HD    09/28/2021  Imm Admin: Influenza Vaccine Adult HD    10/20/2020  Imm Admin: Influenza Vaccine Adult HD    10/03/2019  Imm Admin: Influenza Vaccine Adult HD    12/06/2017  Imm Admin: Influenza Vaccine Adult HD    Only the first 5 history entries have been loaded, but more history exists.              COVID-19 Vaccine (Series Information) Completed      10/05/2022  Imm Admin: PFIZER BIVALENT BOOSTER SARS-COV-2 VACCINE (12+)    04/09/2022  Imm Admin: PFIZER MARTIN CAP SARS-COV-2 VACCINATION (12+)    10/05/2021  Imm Admin: PFIZER PURPLE CAP SARS-COV-2 VACCINATION (12+)    02/19/2021  Imm Admin: PFIZER PURPLE CAP SARS-COV-2 VACCINATION (12+)    01/27/2021  Imm Admin: PFIZER PURPLE CAP SARS-COV-2 VACCINATION (12+)              IMM MENINGOCOCCAL ACWY VACCINE (Series  Information) Aged Out      No completion history exists for this topic.              Discontinued - PAP SMEAR  Discontinued      No completion history exists for this topic.                  Patient Care Team:  Afia Munoz D.O. as PCP - General (Family Medicine)  Afia Munoz D.O. as PCP - [facility]  She Mae M.D. as Consulting Physician (Nephrology)  [facility] as Home Health Provider  JORGE SandsNJORGE SmithN.AAmrik Callahan C.N.A.    Social History     Tobacco Use    Smoking status: Former     Packs/day: 0.25     Years: 12.00     Pack years: 3.00     Types: Cigarettes     Quit date: 1977     Years since quittin.8    Smokeless tobacco: Never    Tobacco comments:     Started smoking at age 25 (social smoker)   Vaping Use    Vaping Use: Never used   Substance Use Topics    Alcohol use: No     Alcohol/week: 0.0 oz    Drug use: No     Family History   Problem Relation Age of Onset    Heart Attack Mother     Alcohol abuse Mother         etoh    Lung Disease Father         smoker    Alcohol abuse Father         etoh    Heart Attack Father     Hypertension Sister     Hyperlipidemia Sister     Lung Disease Sister         smoker    Colon Cancer Sister     Lung Disease Maternal Aunt     Heart Disease Maternal Aunt     Hypertension Maternal Aunt     Hyperlipidemia Maternal Aunt     Stroke Maternal Grandmother     Other Maternal Grandmother         TB of the brain    Stroke Maternal Grandfather     Colon Cancer Paternal Grandmother     No Known Problems Paternal Grandfather      She  has a past medical history of Abnormal Pap smear of cervix, CKD (chronic kidney disease), stage III (HCC) (2015), COVID-19 (2020), Essential hypertension (2015), Other specified hypothyroidism (9/3/2015), and Tuberculosis.    She has no past medical history of Headache(784.0), Ulcer, or Urinary tract infection, site not specified.   Past Surgical History:   Procedure  Laterality Date    TONSILLECTOMY       Exam:   /68 (BP Location: Right arm, Patient Position: Sitting, BP Cuff Size: Adult)   Pulse 90   Temp 36.6 °C (97.8 °F) (Temporal)   Resp 20   Ht 1.524 m (5')   Wt 109 kg (240 lb)   SpO2 95%  Body mass index is 46.87 kg/m².    Constitutional: Obese. Well-developed and well-nourished. No acute distress.   Skin: Skin is warm and dry. No rash noted.  Head: Atraumatic without lesions.  Eyes: Conjunctivae and extraocular motions are normal. Pupils are equal, round. No scleral icterus.   Mouth/Throat: Wearing mask.  Neck: Supple, trachea midline.   Cardiovascular: Regular rate and rhythm, S1 and S2 without murmur, rubs, or gallops.  Lungs: Normal inspiratory effort, CTA bilaterally, no wheezes/rhonchi/rales  Extremities: No cyanosis, clubbing, erythema, nor edema.   Neurological: Alert and oriented x 3. No gross/focal deficits.  Psychiatric:  Behavior, mood, and affect are appropriate.    Labs: 10/11/2022    Assessment and Plan. The following treatment and monitoring plan is recommended:    1. Medicare annual wellness visit, subsequent  Services suggested: No services needed at this time  Health Care Screening: Age-appropriate preventive services recommended by USPTF and ACIP covered by Medicare were discussed today. Services ordered if indicated and agreed upon by the patient.  Referrals offered: Community-based lifestyle interventions to reduce health risks and promote self-management and wellness, fall prevention, nutrition, physical activity, tobacco-use cessation, weight loss, and mental health services as per orders if indicated.    Discussion today about general wellness and lifestyle habits:    Prevent falls and reduce trip hazards; Cautioned about securing or removing rugs.  Engage in regular physical activity and social activities     2. Stage 3a chronic kidney disease (HCC)  This is a chronic condition, Cr slightly worse but without microalbuminuria. Followed  by nephrology with appointment scheduled tomorrow.  Ensure adequate hydration, avoid nephrotoxic medication.  Blood pressure well controlled. Continue ARB and follow-up with nephrology as scheduled.  Trend Cr in 6m or as directed by specialist.   - Comp Metabolic Panel; Future  - CBC WITH DIFFERENTIAL; Future  - VITAMIN D,25 HYDROXY (DEFICIENCY); Future    3. Dyslipidemia  4. PVD (peripheral vascular disease) ([facility])  5. Atherosclerosis of aorta (HCC)  Chronic, well controlled. No changes to current treatment/medications,continue statin at current dose.  Unable to tolerate aspirin. Encouraged exercise as tolerated.    6. Acquired hypothyroidism  Chronic, well controlled. No changes to current medications.     7. BMI 45.0-49.9, adult ([facility])  This is a chronic condition, slightly worsening from prior.  Continue exercise, discussed importance of healthy diet and portion control.  We will trend.    8. Impaired fasting glucose  Chronic, stable.  Continue exercise, advised dietary changes.  Trend A1c with next labs.  - HEMOGLOBIN A1C; Future    9. Elevated alkaline phosphatase level  This is a chronic condition, improved from last year.  DEXA scan normal 2019, vitamin D normal in April of this year.  Will obtain isoenzymes with next labs but also right upper quadrant ultrasound as I suspect hepatic steatosis as a cause.  Discussed diet and exercise for weight loss to improve this.  - US-RUQ; Future  - ALKALINE PHOSPHATASE ISOENZYMES; Future  - Comp Metabolic Panel; Future    Follow-up: Return in about 6 months (around 4/18/2023), or if symptoms worsen or fail to improve.

## 2022-10-18 NOTE — NON-PROVIDER
Chief Complaint   Patient presents with    Annual Exam       HPI:  Jammie Berry is a 82 y.o. here for Medicare Annual Wellness Visit     Patient Active Problem List    Diagnosis Date Noted    Morbid obesity ([facility]) 03/31/2022    BMI 45.0-49.9, adult ([facility]) 03/30/2022    PVD (peripheral vascular disease) ([facility]) 03/30/2022    Dyslipidemia 03/30/2022    CKD (chronic kidney disease) stage 2, GFR 60-89 ml/min 02/22/2022    Elevated LDL cholesterol level 02/22/2022    Atherosclerosis of aorta ([facility]) 06/25/2021    Chronic bilateral low back pain without sciatica 05/14/2020    Severe obesity (BMI >= 40) ([facility]) 05/24/2018    Glaucoma of both eyes 05/24/2018    Essential hypertension 09/04/2015    Acquired hypothyroidism 09/03/2015       Current Outpatient Medications   Medication Sig Dispense Refill    rosuvastatin (CRESTOR) 10 MG Tab Take 1 Tablet by mouth every evening. 90 Tablet 3    telmisartan (MICARDIS) 40 MG Tab TAKE 1 TABLET BY MOUTH EVERY  Tablet 3    atenolol (TENORMIN) 25 MG Tab TAKE 1 TABLET BY MOUTH EVERY DAY 90 Tablet 3    levothyroxine (SYNTHROID) 50 MCG Tab TAKE 1 TABLET BY MOUTH EVERY DAY 90 Tablet 3    zinc sulfate (ZINCATE) 220 (50 Zn) MG Cap Take 220 mg by mouth every day.      Ascorbic Acid (VITAMIN C) 1000 MG Tab Take  by mouth.      acetaminophen (TYLENOL) 325 MG Tab Take 2 Tabs by mouth every 6 hours as needed (Mild Pain; (Pain scale 1-3); Temp greater than 100.5 F). 30 Tab 0    cyanocobalamin (VITAMIN B-12) 500 MCG Tab Take 500 mcg by mouth every day.      Cholecalciferol (VITAMIN D-3) 25 MCG (1000 UT) Cap Take 1,000 Units by mouth every day.      therapeutic multivitamin-minerals (THERAGRAN-M) TABS Take 1 Tab by mouth every morning.       No current facility-administered medications for this visit.          Current supplements as per medication list.     Allergies: Patient has no known allergies.    Current social contact/activities: ***     She  reports that she quit smoking about 45 years  ago. Her smoking use included cigarettes. She has a 3.00 pack-year smoking history. She has never used smokeless tobacco. She reports that she does not drink alcohol and does not use drugs.  Counseling given: Not Answered  Tobacco comments: Started smoking at age 25 (social smoker)      DPA/Advanced Directive:  Patient has Advanced Directive on file.     ROS:    Gait: Uses no assistive device  Ostomy: No  Other tubes: No  Amputations: No  Chronic oxygen use: No  Last eye exam: Pt state 3 years ago   Wears hearing aids: No   : Denies any urinary leakage during the last 6 months    Screening:  ***  Depression Screening  Little interest or pleasure in doing things?     Feeling down, depressed , or hopeless?    Trouble falling or staying asleep, or sleeping too much?     Feeling tired or having little energy?     Poor appetite or overeating?     Feeling bad about yourself - or that you are a failure or have let yourself or your family down?    Trouble concentrating on things, such as reading the newspaper or watching television?    Moving or speaking so slowly that other people could have noticed.  Or the opposite - being so fidgety or restless that you have been moving around a lot more than usual?     Thoughts that you would be better off dead, or of hurting yourself?     Patient Health Questionnaire Score:      If depressive symptoms identified deferred to follow up visit unless specifically addressed in assessment and plan.    Interpretation of PHQ-9 Total Score   Score Severity   1-4 No Depression   5-9 Mild Depression   10-14 Moderate Depression   15-19 Moderately Severe Depression   20-27 Severe Depression    Screening for Cognitive Impairment  Three Minute Recall (daughter, heaven, mountain)  /3    Bryan clock face with all 12 numbers and set the hands to show 10 past 11.       Cognitive concerns identified deferred for follow up unless specifically addressed in assessment and plan.    Fall Risk Assessment  Has  the patient had two or more falls in the last year or any fall with injury in the last year?       Safety Assessment  Throw rugs on floor.     Handrails on all stairs.     Good lighting in all hallways.     Difficulty hearing.     Patient counseled about all safety risks that were identified.    Functional Assessment ADLs  Are there any barriers preventing you from cooking for yourself or meeting nutritional needs?   .    Are there any barriers preventing you from driving safely or obtaining transportation?   .    Are there any barriers preventing you from using a telephone or calling for help?   .    Are there any barriers preventing you from shopping?   .    Are there any barriers preventing you from taking care of your own finances?   .    Are there any barriers preventing you from managing your medications?   .    Are there any barriers preventing you from showering, bathing or dressing yourself?  .    Are you currently engaging in any exercise or physical activity?   .     What is your perception of your health?  .    Advance Care Planning  Do you have an Advance Directive, Living Will, Durable Power of , or POLST?  .    Health Maintenance Summary            Overdue - IMM HEP B VACCINE (1 of 3 - 3-dose series) Overdue - never done      No completion history exists for this topic.              Overdue - MAMMOGRAM (Yearly) Overdue since 7/30/2022 07/30/2021  MA-SCREENING MAMMO BILAT W/TOMOSYNTHESIS W/CAD    07/24/2020  MA-SCREENING MAMMO BILAT W/TOMOSYNTHESIS W/CAD    07/23/2019  MA-SCREENING MAMMO BILAT W/TOMOSYNTHESIS W/CAD    07/19/2018  MA-MAMMO SCREENING BILAT W/JOSEPH W/CAD    07/17/2017  MA-MAMMO SCREENING BILAT W/JOSEPH W/CAD    Only the first 5 history entries have been loaded, but more history exists.              Annual Wellness Visit (Every 366 Days) Next due on 3/31/2023      03/30/2022  Level of Service: ID ANNUAL WELLNESS VISIT-INCLUDES PPPS SUBSEQUE*    06/25/2021  Level of Service: ID  ANNUAL WELLNESS VISIT-INCLUDES PPPS SUBSEQUE*    06/25/2021  Visit Dx: Medicare annual wellness visit, subsequent    06/04/2019  Subsequent Annual Wellness Visit - Includes PPPS ()    12/06/2017  Visit Dx: Medicare annual wellness visit, subsequent    Only the first 5 history entries have been loaded, but more history exists.              BONE DENSITY (Every 5 Years) Next due on 7/23/2024 07/23/2019  DS-BONE DENSITY STUDY (DEXA)    07/11/2014  DS-BONE DENSITY STUDY (DEXA)              COLORECTAL CANCER SCREENING (COLONOSCOPY - Every 10 Years) Next due on 10/12/2025      10/12/2015  REFERRAL TO GI FOR COLONOSCOPY    07/15/2015  OCCULT BLOOD FECES IMMUNOASSAY              IMM DTaP/Tdap/Td Vaccine (2 - Td or Tdap) Next due on 8/29/2026 08/29/2016  Imm Admin: Tdap Vaccine              IMM PNEUMOCOCCAL VACCINE: 65+ Years (Series Information) Completed      10/13/2016  Imm Admin: Pneumococcal polysaccharide vaccine (PPSV-23)    10/12/2015  Imm Admin: Pneumococcal Conjugate Vaccine (Prevnar/PCV-13)              IMM ZOSTER VACCINES (Series Information) Completed      10/20/2020  Imm Admin: Zoster Vaccine Recombinant (RZV) (SHINGRIX)    05/14/2020  Imm Admin: Zoster Vaccine Recombinant (RZV) (SHINGRIX)    04/13/2017  Imm Admin: Zoster Vaccine Live (ZVL) (Zostavax) - HISTORICAL DATA              IMM INFLUENZA (Series Information) Completed      10/05/2022  Imm Admin: Influenza Vaccine Adult HD    09/28/2021  Imm Admin: Influenza Vaccine Adult HD    10/20/2020  Imm Admin: Influenza Vaccine Adult HD    10/03/2019  Imm Admin: Influenza Vaccine Adult HD    12/06/2017  Imm Admin: Influenza Vaccine Adult HD    Only the first 5 history entries have been loaded, but more history exists.              COVID-19 Vaccine (Series Information) Completed      10/05/2022  Imm Admin: PFIZER BIVALENT BOOSTER SARS-COV-2 VACCINE (12+)    04/09/2022  Imm Admin: PFIZER MARTIN CAP SARS-COV-2 VACCINATION (12+)    10/05/2021  Imm Admin:  PFIZER PURPLE CAP SARS-COV-2 VACCINATION (12+)    2021  Imm Admin: PFIZER PURPLE CAP SARS-COV-2 VACCINATION (12+)    2021  Imm Admin: PFIZER PURPLE CAP SARS-COV-2 VACCINATION (12+)              IMM MENINGOCOCCAL ACWY VACCINE (Series Information) Aged Out      No completion history exists for this topic.              Discontinued - PAP SMEAR  Discontinued      No completion history exists for this topic.                    Patient Care Team:  Afia Munoz D.O. as PCP - General (Family Medicine)  Afia Munoz D.O. as PCP - [facility] Paneled  She Mae M.D. as Consulting Physician (Nephrology)  [facility] as Home Health Provider  Lillie Crenshaw C.N.A.  Pallavi Reyna C.N.AAmrik Callahan, C.N.A.      Social History     Tobacco Use    Smoking status: Former     Packs/day: 0.25     Years: 12.00     Pack years: 3.00     Types: Cigarettes     Quit date: 1977     Years since quittin.8    Smokeless tobacco: Never    Tobacco comments:     Started smoking at age 25 (social smoker)   Vaping Use    Vaping Use: Never used   Substance Use Topics    Alcohol use: No     Alcohol/week: 0.0 oz    Drug use: No     Family History   Problem Relation Age of Onset    Heart Attack Mother     Alcohol abuse Mother         etoh    Lung Disease Father         smoker    Alcohol abuse Father         etoh    Heart Attack Father     Hypertension Sister     Hyperlipidemia Sister     Lung Disease Sister         smoker    Colon Cancer Sister     Lung Disease Maternal Aunt     Heart Disease Maternal Aunt     Hypertension Maternal Aunt     Hyperlipidemia Maternal Aunt     Stroke Maternal Grandmother     Other Maternal Grandmother         TB of the brain    Stroke Maternal Grandfather     Colon Cancer Paternal Grandmother     No Known Problems Paternal Grandfather      She  has a past medical history of Abnormal Pap smear of cervix, CKD (chronic kidney disease), stage III (HCC) (2015), COVID-19 (2020),  Essential hypertension (9/4/2015), Other specified hypothyroidism (9/3/2015), and Tuberculosis.    She has no past medical history of Headache(784.0), Ulcer, or Urinary tract infection, site not specified.   Past Surgical History:   Procedure Laterality Date    TONSILLECTOMY         Exam:   There were no vitals taken for this visit. There is no height or weight on file to calculate BMI.    Hearing {GOOD/FAIR/POOR/EXCELLENT:14791}.    Dentition {DENTITION:38447}  Alert, oriented in no acute distress.  Eye contact is good, speech goal directed, affect calm    Assessment and Plan. The following treatment and monitoring plan is recommended:  ***  There are no diagnoses linked to this encounter.    Services suggested: { AWV COORDINATION OF SERVICES:53735}  Health Care Screening: Age-appropriate preventive services recommended by USPTF and ACIP covered by Medicare were discussed today. Services ordered if indicated and agreed upon by the patient.  Referrals offered: Community-based lifestyle interventions to reduce health risks and promote self-management and wellness, fall prevention, nutrition, physical activity, tobacco-use cessation, weight loss, and mental health services as per orders if indicated.    Discussion today about general wellness and lifestyle habits:    Prevent falls and reduce trip hazards; Cautioned about securing or removing rugs.  Have a working fire alarm and carbon monoxide detector;   Engage in regular physical activity and social activities     Follow-up: No follow-ups on file.

## 2022-10-18 NOTE — PROGRESS NOTES
Chief Complaint   Patient presents with    Annual Exam     HPI:  Jammie Berry is a 82 y.o. here for Medicare Annual Wellness Visit     Patient Active Problem List    Diagnosis Date Noted    Morbid obesity ([facility]) 03/31/2022    BMI 45.0-49.9, adult ([facility]) 03/30/2022    PVD (peripheral vascular disease) ([facility]) 03/30/2022    Dyslipidemia 03/30/2022    CKD (chronic kidney disease) stage 2, GFR 60-89 ml/min 02/22/2022    Elevated LDL cholesterol level 02/22/2022    Atherosclerosis of aorta ([facility]) 06/25/2021    Chronic bilateral low back pain without sciatica 05/14/2020    Severe obesity (BMI >= 40) ([facility]) 05/24/2018    Glaucoma of both eyes 05/24/2018    Essential hypertension 09/04/2015    Acquired hypothyroidism 09/03/2015     Current Outpatient Medications   Medication Sig Dispense Refill    rosuvastatin (CRESTOR) 10 MG Tab Take 1 Tablet by mouth every evening. 90 Tablet 3    telmisartan (MICARDIS) 40 MG Tab TAKE 1 TABLET BY MOUTH EVERY  Tablet 3    atenolol (TENORMIN) 25 MG Tab TAKE 1 TABLET BY MOUTH EVERY DAY 90 Tablet 3    levothyroxine (SYNTHROID) 50 MCG Tab TAKE 1 TABLET BY MOUTH EVERY DAY 90 Tablet 3    zinc sulfate (ZINCATE) 220 (50 Zn) MG Cap Take 220 mg by mouth every day.      Ascorbic Acid (VITAMIN C) 1000 MG Tab Take  by mouth.      acetaminophen (TYLENOL) 325 MG Tab Take 2 Tabs by mouth every 6 hours as needed (Mild Pain; (Pain scale 1-3); Temp greater than 100.5 F). 30 Tab 0    cyanocobalamin (VITAMIN B-12) 500 MCG Tab Take 500 mcg by mouth every day.      Cholecalciferol (VITAMIN D-3) 25 MCG (1000 UT) Cap Take 1,000 Units by mouth every day.      therapeutic multivitamin-minerals (THERAGRAN-M) TABS Take 1 Tab by mouth every morning.       No current facility-administered medications for this visit.          Current supplements as per medication list.     Allergies: Patient has no known allergies.    Current social contact/activities: ***     She  reports that she quit smoking about 45 years ago.  Her smoking use included cigarettes. She has a 3.00 pack-year smoking history. She has never used smokeless tobacco. She reports that she does not drink alcohol and does not use drugs.  Counseling given: Not Answered  Tobacco comments: Started smoking at age 25 (social smoker)    DPA/Advanced Directive:  Patient has Advanced Directive on file.     ROS:    Gait: Uses no assistive device  Ostomy: No  Other tubes: No  Amputations: No  Chronic oxygen use: No  Last eye exam: Pt states 3 years go  Wears hearing aids: No   : Denies any urinary leakage during the last 6 months    Screening:    Depression Screening  Little interest or pleasure in doing things?     Feeling down, depressed , or hopeless?    Trouble falling or staying asleep, or sleeping too much?     Feeling tired or having little energy?     Poor appetite or overeating?     Feeling bad about yourself - or that you are a failure or have let yourself or your family down?    Trouble concentrating on things, such as reading the newspaper or watching television?    Moving or speaking so slowly that other people could have noticed.  Or the opposite - being so fidgety or restless that you have been moving around a lot more than usual?     Thoughts that you would be better off dead, or of hurting yourself?     Patient Health Questionnaire Score:      If depressive symptoms identified deferred to follow up visit unless specifically addressed in assessment and plan.    Interpretation of PHQ-9 Total Score   Score Severity   1-4 No Depression   5-9 Mild Depression   10-14 Moderate Depression   15-19 Moderately Severe Depression   20-27 Severe Depression    Screening for Cognitive Impairment  Three Minute Recall (daughter, heaven, mountain)  /3    Bryan clock face with all 12 numbers and set the hands to show 10 past 11.       Cognitive concerns identified deferred for follow up unless specifically addressed in assessment and plan.    Fall Risk Assessment  Has the  patient had two or more falls in the last year or any fall with injury in the last year?       Safety Assessment  Throw rugs on floor.     Handrails on all stairs.     Good lighting in all hallways.     Difficulty hearing.     Patient counseled about all safety risks that were identified.    Functional Assessment ADLs  Are there any barriers preventing you from cooking for yourself or meeting nutritional needs?   .    Are there any barriers preventing you from driving safely or obtaining transportation?   .    Are there any barriers preventing you from using a telephone or calling for help?   .    Are there any barriers preventing you from shopping?   .    Are there any barriers preventing you from taking care of your own finances?   .    Are there any barriers preventing you from managing your medications?   .    Are there any barriers preventing you from showering, bathing or dressing yourself?  .    Are you currently engaging in any exercise or physical activity?   .     What is your perception of your health?  .    Advance Care Planning  Do you have an Advance Directive, Living Will, Durable Power of , or POLST? Yes.    Health Maintenance Summary            Overdue - IMM HEP B VACCINE (1 of 3 - 3-dose series) Overdue - never done      No completion history exists for this topic.              Overdue - MAMMOGRAM (Yearly) Overdue since 7/30/2022 07/30/2021  MA-SCREENING MAMMO BILAT W/TOMOSYNTHESIS W/CAD    07/24/2020  MA-SCREENING MAMMO BILAT W/TOMOSYNTHESIS W/CAD    07/23/2019  MA-SCREENING MAMMO BILAT W/TOMOSYNTHESIS W/CAD    07/19/2018  MA-MAMMO SCREENING BILAT W/JOSEPH W/CAD    07/17/2017  MA-MAMMO SCREENING BILAT W/JOSEPH W/CAD    Only the first 5 history entries have been loaded, but more history exists.              Annual Wellness Visit (Every 366 Days) Next due on 3/31/2023      03/30/2022  Level of Service: [place] ANNUAL WELLNESS VISIT-INCLUDES PPPS SUBSEQUE*    06/25/2021  Level of Service: RI  ANNUAL WELLNESS VISIT-INCLUDES PPPS SUBSEQUE*    06/25/2021  Visit Dx: Medicare annual wellness visit, subsequent    06/04/2019  Subsequent Annual Wellness Visit - Includes PPPS ()    12/06/2017  Visit Dx: Medicare annual wellness visit, subsequent    Only the first 5 history entries have been loaded, but more history exists.              BONE DENSITY (Every 5 Years) Next due on 7/23/2024 07/23/2019  DS-BONE DENSITY STUDY (DEXA)    07/11/2014  DS-BONE DENSITY STUDY (DEXA)              COLORECTAL CANCER SCREENING (COLONOSCOPY - Every 10 Years) Next due on 10/12/2025      10/12/2015  REFERRAL TO GI FOR COLONOSCOPY    07/15/2015  OCCULT BLOOD FECES IMMUNOASSAY              IMM DTaP/Tdap/Td Vaccine (2 - Td or Tdap) Next due on 8/29/2026 08/29/2016  Imm Admin: Tdap Vaccine              IMM PNEUMOCOCCAL VACCINE: 65+ Years (Series Information) Completed      10/13/2016  Imm Admin: Pneumococcal polysaccharide vaccine (PPSV-23)    10/12/2015  Imm Admin: Pneumococcal Conjugate Vaccine (Prevnar/PCV-13)              IMM ZOSTER VACCINES (Series Information) Completed      10/20/2020  Imm Admin: Zoster Vaccine Recombinant (RZV) (SHINGRIX)    05/14/2020  Imm Admin: Zoster Vaccine Recombinant (RZV) (SHINGRIX)    04/13/2017  Imm Admin: Zoster Vaccine Live (ZVL) (Zostavax) - HISTORICAL DATA              IMM INFLUENZA (Series Information) Completed      10/05/2022  Imm Admin: Influenza Vaccine Adult HD    09/28/2021  Imm Admin: Influenza Vaccine Adult HD    10/20/2020  Imm Admin: Influenza Vaccine Adult HD    10/03/2019  Imm Admin: Influenza Vaccine Adult HD    12/06/2017  Imm Admin: Influenza Vaccine Adult HD    Only the first 5 history entries have been loaded, but more history exists.              COVID-19 Vaccine (Series Information) Completed      10/05/2022  Imm Admin: PFIZER BIVALENT BOOSTER SARS-COV-2 VACCINE (12+)    04/09/2022  Imm Admin: PFIZER MARTIN CAP SARS-COV-2 VACCINATION (12+)    10/05/2021  Imm Admin:  PFIZER PURPLE CAP SARS-COV-2 VACCINATION (12+)    2021  Imm Admin: PFIZER PURPLE CAP SARS-COV-2 VACCINATION (12+)    2021  Imm Admin: PFIZER PURPLE CAP SARS-COV-2 VACCINATION (12+)              IMM MENINGOCOCCAL ACWY VACCINE (Series Information) Aged Out      No completion history exists for this topic.              Discontinued - PAP SMEAR  Discontinued      No completion history exists for this topic.                    Patient Care Team:  Afia Munoz D.O. as PCP - General (Family Medicine)  Afia Munoz D.O. as PCP - [facility] Paneled  She Mae M.D. as Consulting Physician (Nephrology)  [facility] as Home Health Provider  Lillie Crenshaw C.N.A.  Pallavi Reyna C.N.AAmrik Callahan, C.N.A.      Social History     Tobacco Use    Smoking status: Former     Packs/day: 0.25     Years: 12.00     Pack years: 3.00     Types: Cigarettes     Quit date: 1977     Years since quittin.8    Smokeless tobacco: Never    Tobacco comments:     Started smoking at age 25 (social smoker)   Vaping Use    Vaping Use: Never used   Substance Use Topics    Alcohol use: No     Alcohol/week: 0.0 oz    Drug use: No     Family History   Problem Relation Age of Onset    Heart Attack Mother     Alcohol abuse Mother         etoh    Lung Disease Father         smoker    Alcohol abuse Father         etoh    Heart Attack Father     Hypertension Sister     Hyperlipidemia Sister     Lung Disease Sister         smoker    Colon Cancer Sister     Lung Disease Maternal Aunt     Heart Disease Maternal Aunt     Hypertension Maternal Aunt     Hyperlipidemia Maternal Aunt     Stroke Maternal Grandmother     Other Maternal Grandmother         TB of the brain    Stroke Maternal Grandfather     Colon Cancer Paternal Grandmother     No Known Problems Paternal Grandfather      She  has a past medical history of Abnormal Pap smear of cervix, CKD (chronic kidney disease), stage III (HCC) (2015), COVID-19 (2020),  Essential hypertension (9/4/2015), Other specified hypothyroidism (9/3/2015), and Tuberculosis.    She has no past medical history of Headache(784.0), Ulcer, or Urinary tract infection, site not specified.   Past Surgical History:   Procedure Laterality Date    TONSILLECTOMY         Exam:   /68 (BP Location: Right arm, Patient Position: Sitting, BP Cuff Size: Adult)   Pulse 90   Temp 36.6 °C (97.8 °F) (Temporal)   Resp 20   Ht 1.524 m (5')   Wt 109 kg (240 lb)   SpO2 95%  Body mass index is 46.87 kg/m².    Hearing {GOOD/FAIR/POOR/EXCELLENT:51883}.    Dentition {DENTITION:15753}  Alert, oriented in no acute distress.  Eye contact is good, speech goal directed, affect calm    Assessment and Plan. The following treatment and monitoring plan is recommended:  ***  There are no diagnoses linked to this encounter.    Services suggested: { AWV COORDINATION OF SERVICES:20405}  Health Care Screening: Age-appropriate preventive services recommended by USPTF and ACIP covered by Medicare were discussed today. Services ordered if indicated and agreed upon by the patient.  Referrals offered: Community-based lifestyle interventions to reduce health risks and promote self-management and wellness, fall prevention, nutrition, physical activity, tobacco-use cessation, weight loss, and mental health services as per orders if indicated.    Discussion today about general wellness and lifestyle habits:    Prevent falls and reduce trip hazards; Cautioned about securing or removing rugs.  Have a working fire alarm and carbon monoxide detector;   Engage in regular physical activity and social activities     Follow-up: No follow-ups on file.

## 2022-10-18 NOTE — PROGRESS NOTES
Chief Complaint   Patient presents with    Medicare Annual Wellness     HPI:  Jammie Berry is a 82 y.o. here for Medicare Annual Wellness Visit     Patient Active Problem List    Diagnosis Date Noted    Morbid obesity ([facility]) 03/31/2022    BMI 45.0-49.9, adult ([facility]) 03/30/2022    PVD (peripheral vascular disease) ([facility]) 03/30/2022    Dyslipidemia 03/30/2022    CKD (chronic kidney disease) stage 2, GFR 60-89 ml/min 02/22/2022    Elevated LDL cholesterol level 02/22/2022    Atherosclerosis of aorta ([facility]) 06/25/2021    Chronic bilateral low back pain without sciatica 05/14/2020    Severe obesity (BMI >= 40) ([facility]) 05/24/2018    Glaucoma of both eyes 05/24/2018    Essential hypertension 09/04/2015    Acquired hypothyroidism 09/03/2015     Current Outpatient Medications   Medication Sig Dispense Refill    rosuvastatin (CRESTOR) 10 MG Tab Take 1 Tablet by mouth every evening. 90 Tablet 3    telmisartan (MICARDIS) 40 MG Tab TAKE 1 TABLET BY MOUTH EVERY  Tablet 3    atenolol (TENORMIN) 25 MG Tab TAKE 1 TABLET BY MOUTH EVERY DAY 90 Tablet 3    levothyroxine (SYNTHROID) 50 MCG Tab TAKE 1 TABLET BY MOUTH EVERY DAY 90 Tablet 3    zinc sulfate (ZINCATE) 220 (50 Zn) MG Cap Take 220 mg by mouth every day.      Ascorbic Acid (VITAMIN C) 1000 MG Tab Take  by mouth.      acetaminophen (TYLENOL) 325 MG Tab Take 2 Tabs by mouth every 6 hours as needed (Mild Pain; (Pain scale 1-3); Temp greater than 100.5 F). 30 Tab 0    cyanocobalamin (VITAMIN B-12) 500 MCG Tab Take 500 mcg by mouth every day.      Cholecalciferol (VITAMIN D-3) 25 MCG (1000 UT) Cap Take 1,000 Units by mouth every day.      therapeutic multivitamin-minerals (THERAGRAN-M) TABS Take 1 Tab by mouth every morning.       No current facility-administered medications for this visit.          Current supplements as per medication list.     Allergies: Patient has no known allergies.    Current social contact/activities: ***     She  reports that she quit smoking about  45 years ago. Her smoking use included cigarettes. She has a 3.00 pack-year smoking history. She has never used smokeless tobacco. She reports that she does not drink alcohol and does not use drugs.  Counseling given: Not Answered  Tobacco comments: Started smoking at age 25 (social smoker)    DPA/Advanced Directive:  Patient has Advanced Directive on file.     ROS:    Gait: Uses no assistive device  Ostomy: No  Other tubes: No  Amputations: No  Chronic oxygen use: No  Last eye exam: Pt states 3 years go  Wears hearing aids: No   : Denies any urinary leakage during the last 6 months    Screening:    Depression Screening  Little interest or pleasure in doing things?     Feeling down, depressed , or hopeless?    Trouble falling or staying asleep, or sleeping too much?     Feeling tired or having little energy?     Poor appetite or overeating?     Feeling bad about yourself - or that you are a failure or have let yourself or your family down?    Trouble concentrating on things, such as reading the newspaper or watching television?    Moving or speaking so slowly that other people could have noticed.  Or the opposite - being so fidgety or restless that you have been moving around a lot more than usual?     Thoughts that you would be better off dead, or of hurting yourself?     Patient Health Questionnaire Score:      If depressive symptoms identified deferred to follow up visit unless specifically addressed in assessment and plan.    Interpretation of PHQ-9 Total Score   Score Severity   1-4 No Depression   5-9 Mild Depression   10-14 Moderate Depression   15-19 Moderately Severe Depression   20-27 Severe Depression    Screening for Cognitive Impairment  Three Minute Recall (daughter, heaven, mountain)  /3    Bryan clock face with all 12 numbers and set the hands to show 10 past 11.       Cognitive concerns identified deferred for follow up unless specifically addressed in assessment and plan.    Fall Risk  Assessment  Has the patient had two or more falls in the last year or any fall with injury in the last year?       Safety Assessment  Throw rugs on floor.     Handrails on all stairs.     Good lighting in all hallways.     Difficulty hearing.     Patient counseled about all safety risks that were identified.    Functional Assessment ADLs  Are there any barriers preventing you from cooking for yourself or meeting nutritional needs?   .    Are there any barriers preventing you from driving safely or obtaining transportation?   .    Are there any barriers preventing you from using a telephone or calling for help?   .    Are there any barriers preventing you from shopping?   .    Are there any barriers preventing you from taking care of your own finances?   .    Are there any barriers preventing you from managing your medications?   .    Are there any barriers preventing you from showering, bathing or dressing yourself?  .    Are you currently engaging in any exercise or physical activity?   .     What is your perception of your health?  .    Advance Care Planning  Do you have an Advance Directive, Living Will, Durable Power of , or POLST? Yes.    Health Maintenance Summary            Overdue - IMM HEP B VACCINE (1 of 3 - 3-dose series) Overdue - never done      No completion history exists for this topic.              Overdue - MAMMOGRAM (Yearly) Overdue since 7/30/2022 07/30/2021  MA-SCREENING MAMMO BILAT W/TOMOSYNTHESIS W/CAD    07/24/2020  MA-SCREENING MAMMO BILAT W/TOMOSYNTHESIS W/CAD    07/23/2019  MA-SCREENING MAMMO BILAT W/TOMOSYNTHESIS W/CAD    07/19/2018  MA-MAMMO SCREENING BILAT W/JOSEPH W/CAD    07/17/2017  MA-MAMMO SCREENING BILAT W/JOSEPH W/CAD    Only the first 5 history entries have been loaded, but more history exists.              Annual Wellness Visit (Every 366 Days) Next due on 10/19/2023      10/18/2022  Level of Service: ANNUAL WELLNESS VISIT-INCLUDES PPPS SUBSEQUE*    10/18/2022  Visit  Dx: Medicare annual wellness visit, subsequent    03/30/2022  Level of Service: ME ANNUAL WELLNESS VISIT-INCLUDES PPPS SUBSEQUE*    06/25/2021  Level of Service: ME ANNUAL WELLNESS VISIT-INCLUDES PPPS SUBSEQUE*    06/25/2021  Visit Dx: Medicare annual wellness visit, subsequent    Only the first 5 history entries have been loaded, but more history exists.              BONE DENSITY (Every 5 Years) Next due on 7/23/2024 07/23/2019  DS-BONE DENSITY STUDY (DEXA)    07/11/2014  DS-BONE DENSITY STUDY (DEXA)              COLORECTAL CANCER SCREENING (COLONOSCOPY - Every 10 Years) Next due on 10/12/2025      10/12/2015  REFERRAL TO GI FOR COLONOSCOPY    07/15/2015  OCCULT BLOOD FECES IMMUNOASSAY              IMM DTaP/Tdap/Td Vaccine (2 - Td or Tdap) Next due on 8/29/2026 08/29/2016  Imm Admin: Tdap Vaccine              IMM PNEUMOCOCCAL VACCINE: 65+ Years (Series Information) Completed      10/13/2016  Imm Admin: Pneumococcal polysaccharide vaccine (PPSV-23)    10/12/2015  Imm Admin: Pneumococcal Conjugate Vaccine (Prevnar/PCV-13)              IMM ZOSTER VACCINES (Series Information) Completed      10/20/2020  Imm Admin: Zoster Vaccine Recombinant (RZV) (SHINGRIX)    05/14/2020  Imm Admin: Zoster Vaccine Recombinant (RZV) (SHINGRIX)    04/13/2017  Imm Admin: Zoster Vaccine Live (ZVL) (Zostavax) - HISTORICAL DATA              IMM INFLUENZA (Series Information) Completed      10/05/2022  Imm Admin: Influenza Vaccine Adult HD    09/28/2021  Imm Admin: Influenza Vaccine Adult HD    10/20/2020  Imm Admin: Influenza Vaccine Adult HD    10/03/2019  Imm Admin: Influenza Vaccine Adult HD    12/06/2017  Imm Admin: Influenza Vaccine Adult HD    Only the first 5 history entries have been loaded, but more history exists.              COVID-19 Vaccine (Series Information) Completed      10/05/2022  Imm Admin: PFIZER BIVALENT BOOSTER SARS-COV-2 VACCINE (12+)    04/09/2022  Imm Admin: PFIZER MARTIN CAP SARS-COV-2 VACCINATION (12+)     10/05/2021  Imm Admin: PFIZER PURPLE CAP SARS-COV-2 VACCINATION (12+)    2021  Imm Admin: PFIZER PURPLE CAP SARS-COV-2 VACCINATION (12+)    2021  Imm Admin: PFIZER PURPLE CAP SARS-COV-2 VACCINATION (12+)              IMM MENINGOCOCCAL ACWY VACCINE (Series Information) Aged Out      No completion history exists for this topic.              Discontinued - PAP SMEAR  Discontinued      No completion history exists for this topic.                    Patient Care Team:  Afia Munoz D.O. as PCP - General (Family Medicine)  Afia Munoz D.O. as PCP - [facility]  She Mae M.D. as Consulting Physician (Nephrology)  [facility] as Home Health Provider  Lillie Crenshaw, C.N.AAmrik Reyna C.N.AAmrik Callahan, C.N.A.      Social History     Tobacco Use    Smoking status: Former     Packs/day: 0.25     Years: 12.00     Pack years: 3.00     Types: Cigarettes     Quit date: 1977     Years since quittin.8    Smokeless tobacco: Never    Tobacco comments:     Started smoking at age 25 (social smoker)   Vaping Use    Vaping Use: Never used   Substance Use Topics    Alcohol use: No     Alcohol/week: 0.0 oz    Drug use: No     Family History   Problem Relation Age of Onset    Heart Attack Mother     Alcohol abuse Mother         etoh    Lung Disease Father         smoker    Alcohol abuse Father         etoh    Heart Attack Father     Hypertension Sister     Hyperlipidemia Sister     Lung Disease Sister         smoker    Colon Cancer Sister     Lung Disease Maternal Aunt     Heart Disease Maternal Aunt     Hypertension Maternal Aunt     Hyperlipidemia Maternal Aunt     Stroke Maternal Grandmother     Other Maternal Grandmother         TB of the brain    Stroke Maternal Grandfather     Colon Cancer Paternal Grandmother     No Known Problems Paternal Grandfather      She  has a past medical history of Abnormal Pap smear of cervix, CKD (chronic kidney disease), stage III (HCC) (2015),  COVID-19 (11/2020), Essential hypertension (9/4/2015), Other specified hypothyroidism (9/3/2015), and Tuberculosis.    She has no past medical history of Headache(784.0), Ulcer, or Urinary tract infection, site not specified.   Past Surgical History:   Procedure Laterality Date    TONSILLECTOMY         Exam:   /68 (BP Location: Right arm, Patient Position: Sitting, BP Cuff Size: Adult)   Pulse 90   Temp 36.6 °C (97.8 °F) (Temporal)   Resp 20   Ht 1.524 m (5')   Wt 109 kg (240 lb)   SpO2 95%  Body mass index is 46.87 kg/m².    Hearing {GOOD/FAIR/POOR/EXCELLENT:01296}.    Dentition {DENTITION:94854}  Alert, oriented in no acute distress.  Eye contact is good, speech goal directed, affect calm    Assessment and Plan. The following treatment and monitoring plan is recommended:  ***  1. Medicare annual wellness visit, subsequent    Services suggested: { AWV COORDINATION OF SERVICES:20405}  Health Care Screening: Age-appropriate preventive services recommended by USPTF and ACIP covered by Medicare were discussed today. Services ordered if indicated and agreed upon by the patient.  Referrals offered: Community-based lifestyle interventions to reduce health risks and promote self-management and wellness, fall prevention, nutrition, physical activity, tobacco-use cessation, weight loss, and mental health services as per orders if indicated.    Discussion today about general wellness and lifestyle habits:    Prevent falls and reduce trip hazards; Cautioned about securing or removing rugs.  Have a working fire alarm and carbon monoxide detector;   Engage in regular physical activity and social activities     Follow-up: No follow-ups on file.

## 2022-10-19 ENCOUNTER — OFFICE VISIT (OUTPATIENT)
Dept: NEPHROLOGY | Facility: MEDICAL CENTER | Age: 82
End: 2022-10-19
Payer: MEDICARE

## 2022-10-19 VITALS
TEMPERATURE: 97.9 F | BODY MASS INDEX: 47.12 KG/M2 | WEIGHT: 240 LBS | DIASTOLIC BLOOD PRESSURE: 68 MMHG | HEART RATE: 76 BPM | SYSTOLIC BLOOD PRESSURE: 114 MMHG | OXYGEN SATURATION: 90 % | HEIGHT: 60 IN

## 2022-10-19 DIAGNOSIS — N18.31 STAGE 3A CHRONIC KIDNEY DISEASE: ICD-10-CM

## 2022-10-19 DIAGNOSIS — D64.9 ANEMIA, UNSPECIFIED TYPE: ICD-10-CM

## 2022-10-19 DIAGNOSIS — R80.9 MICROALBUMINURIA: ICD-10-CM

## 2022-10-19 DIAGNOSIS — E55.9 VITAMIN D DEFICIENCY DISEASE: ICD-10-CM

## 2022-10-19 DIAGNOSIS — I10 ESSENTIAL HYPERTENSION: ICD-10-CM

## 2022-10-19 PROCEDURE — 99214 OFFICE O/P EST MOD 30 MIN: CPT | Performed by: INTERNAL MEDICINE

## 2022-10-19 ASSESSMENT — ENCOUNTER SYMPTOMS
WEIGHT LOSS: 0
WHEEZING: 0
FLANK PAIN: 0
COUGH: 0
FEVER: 0
ABDOMINAL PAIN: 0
SINUS PAIN: 0
CHILLS: 0
ORTHOPNEA: 0
HEMOPTYSIS: 0
NAUSEA: 0
EYES NEGATIVE: 1
PALPITATIONS: 0
VOMITING: 0
SHORTNESS OF BREATH: 0

## 2022-10-19 ASSESSMENT — FIBROSIS 4 INDEX: FIB4 SCORE: 1.43

## 2022-10-19 NOTE — PROGRESS NOTES
Subjective:      Jammie Berry is a 82 y.o. female who presents with Follow-Up and Chronic Kidney Disease            Chronic Kidney Disease  Pertinent negatives include no abdominal pain, chest pain, chills, congestion, coughing, fever, nausea or vomiting.   Jammie is coming today for f/u of CKD II, HTN  Doing well , no complaints  No difficulties to urinate  No dysuria/hematuria/flank pain  No edema  CKD II/IIIa -creat level slightly worse -to monitor  HTN:BP well controlled, compliant to low Na diet, medications    Review of Systems   Constitutional:  Negative for chills, fever, malaise/fatigue and weight loss.   HENT:  Negative for congestion, hearing loss and sinus pain.    Eyes: Negative.    Respiratory:  Negative for cough, hemoptysis, shortness of breath and wheezing.    Cardiovascular:  Negative for chest pain, palpitations, orthopnea and leg swelling.   Gastrointestinal:  Negative for abdominal pain, nausea and vomiting.   Genitourinary:  Negative for dysuria, flank pain, frequency, hematuria and urgency.   Skin: Negative.    All other systems reviewed and are negative.  Past Medical History:   Diagnosis Date    Abnormal Pap smear of cervix     Dr. 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       Family History   Problem Relation Age of Onset    Heart Attack Mother     Alcohol abuse Mother         etoh    Lung Disease Father         smoker    Alcohol abuse Father         etoh    Heart Attack Father     Hypertension Sister     Hyperlipidemia Sister     Lung Disease Sister         smoker    Colon Cancer Sister     Lung Disease Maternal Aunt     Heart Disease Maternal Aunt     Hypertension Maternal Aunt     Hyperlipidemia Maternal Aunt     Stroke Maternal Grandmother     Other Maternal Grandmother          TB of the brain    Stroke Maternal Grandfather     Colon Cancer Paternal Grandmother     No Known Problems Paternal Grandfather        Social History     Socioeconomic History    Marital status:     Number of children: 0   Occupational History    Occupation: retired CNA   Tobacco Use    Smoking status: Former Smoker     Packs/day: 0.25     Years: 12.00     Pack years: 3.00     Types: Cigarettes     Quit date: 1977     Years since quittin.3    Smokeless tobacco: Never Used    Tobacco comment: Started smoking at age 25 (social smoker)   Vaping Use    Vaping Use: Never used   Substance and Sexual Activity    Alcohol use: No     Alcohol/week: 0.0 oz    Drug use: No    Sexual activity: Never     Partners: Male     Comment: vaginal atrophy-dysparunia          Objective:     /74 (BP Location: Right arm, Patient Position: Sitting, BP Cuff Size: Large adult)   Pulse 84   Temp 36.8 °C (98.2 °F) (Temporal)   Ht 1.524 m (5')   Wt 108 kg (237 lb)   SpO2 93%   BMI 46.29 kg/m²      Physical Exam  Vitals reviewed.   Constitutional:       General: She is not in acute distress.     Appearance: Normal appearance. She is well-developed. She is obese. She is not diaphoretic.   HENT:      Head: Normocephalic and atraumatic.      Nose: Nose normal.      Mouth/Throat:      Mouth: Mucous membranes are moist.      Pharynx: Oropharynx is clear.   Eyes:      Extraocular Movements: Extraocular movements intact.      Conjunctiva/sclera: Conjunctivae normal.      Pupils: Pupils are equal, round, and reactive to light.   Cardiovascular:      Rate and Rhythm: Normal rate and regular rhythm.      Pulses: Normal pulses.      Heart sounds: Normal heart sounds.   Pulmonary:      Effort: Pulmonary effort is normal. No respiratory distress.      Breath sounds: Normal breath sounds. No wheezing or rales.   Abdominal:      General: Bowel sounds are normal. There is no distension.      Palpations: Abdomen is soft. There is no  mass.      Tenderness: There is no abdominal tenderness. There is no right CVA tenderness or left CVA tenderness.   Musculoskeletal:      Cervical back: Normal range of motion and neck supple.      Right lower leg: No edema.      Left lower leg: No edema.   Skin:     General: Skin is warm.      Coloration: Skin is not pale.      Findings: No erythema or rash.   Neurological:      General: No focal deficit present.      Mental Status: She is alert and oriented to person, place, and time.      Cranial Nerves: No cranial nerve deficit.      Coordination: Coordination normal.   Psychiatric:         Mood and Affect: Mood normal.         Behavior: Behavior normal.         Thought Content: Thought content normal.         Judgment: Judgment normal.             Laboratory results reviewed; d/w pt  Lab Results   Component Value Date/Time    CREATININE 0.99 04/06/2022 07:50 AM    POTASSIUM 4.1 04/06/2022 07:50 AM       Assessment/Plan:     1.  CKD II/IIIa       Creat level slightly worse -to monitor       Keep well hydrated    2. Essential hypertension      BP well controlled      Continue current treatment    3. Vitamin D deficiency disease      Vit D level well controlled WNL    4. Anemia, unspecified type      Hb stable -WNL    5. Microalbuminuria:-negative    Recs: continue current treatment, low salt diet, keep well hydrated, avoid NSAID's             F/u in 6 months

## 2022-11-23 ENCOUNTER — HOSPITAL ENCOUNTER (OUTPATIENT)
Dept: RADIOLOGY | Facility: MEDICAL CENTER | Age: 82
End: 2022-11-23
Attending: STUDENT IN AN ORGANIZED HEALTH CARE EDUCATION/TRAINING PROGRAM
Payer: MEDICARE

## 2022-11-23 DIAGNOSIS — R74.8 ELEVATED ALKALINE PHOSPHATASE LEVEL: ICD-10-CM

## 2022-11-23 PROCEDURE — 76705 ECHO EXAM OF ABDOMEN: CPT

## 2022-12-01 ENCOUNTER — DOCUMENTATION (OUTPATIENT)
Dept: HEALTH INFORMATION MANAGEMENT | Facility: OTHER | Age: 82
End: 2022-12-01
Payer: MEDICARE

## 2022-12-21 DIAGNOSIS — E78.00 ELEVATED LDL CHOLESTEROL LEVEL: ICD-10-CM

## 2022-12-21 DIAGNOSIS — I70.0 ATHEROSCLEROSIS OF AORTA (HCC): ICD-10-CM

## 2022-12-21 NOTE — TELEPHONE ENCOUNTER
Received request via: Pharmacy    Was the patient seen in the last year in this department? Yes    Does the patient have an active prescription (recently filled or refills available) for medication(s) requested? No    Does the patient have intermediate Plus and need 100 day supply (blood pressure, diabetes and cholesterol meds only)? Yes, quantity updated to 100 days

## 2022-12-23 RX ORDER — ROSUVASTATIN CALCIUM 10 MG/1
TABLET, COATED ORAL
Qty: 100 TABLET | Refills: 3 | Status: SHIPPED | OUTPATIENT
Start: 2022-12-23 | End: 2023-12-04 | Stop reason: SDUPTHER

## 2023-03-27 DIAGNOSIS — I10 ESSENTIAL HYPERTENSION: ICD-10-CM

## 2023-03-27 DIAGNOSIS — N18.32 STAGE 3B CHRONIC KIDNEY DISEASE: ICD-10-CM

## 2023-03-28 RX ORDER — TELMISARTAN 40 MG/1
TABLET ORAL
Qty: 100 TABLET | Refills: 3 | Status: SHIPPED | OUTPATIENT
Start: 2023-03-28 | End: 2024-01-16 | Stop reason: SDUPTHER

## 2023-04-04 PROBLEM — N18.31 HYPERTENSIVE KIDNEY DISEASE WITH STAGE 3A CHRONIC KIDNEY DISEASE (HCC): Status: ACTIVE | Noted: 2023-04-04

## 2023-04-04 PROBLEM — I12.9 HYPERTENSIVE KIDNEY DISEASE WITH STAGE 3A CHRONIC KIDNEY DISEASE: Status: ACTIVE | Noted: 2023-04-04

## 2023-04-11 ENCOUNTER — HOSPITAL ENCOUNTER (OUTPATIENT)
Dept: LAB | Facility: MEDICAL CENTER | Age: 83
End: 2023-04-11
Attending: STUDENT IN AN ORGANIZED HEALTH CARE EDUCATION/TRAINING PROGRAM
Payer: MEDICARE

## 2023-04-11 ENCOUNTER — HOSPITAL ENCOUNTER (OUTPATIENT)
Dept: LAB | Facility: MEDICAL CENTER | Age: 83
End: 2023-04-11
Attending: INTERNAL MEDICINE
Payer: MEDICARE

## 2023-04-11 DIAGNOSIS — N18.31 STAGE 3A CHRONIC KIDNEY DISEASE: ICD-10-CM

## 2023-04-11 DIAGNOSIS — I10 ESSENTIAL HYPERTENSION: ICD-10-CM

## 2023-04-11 DIAGNOSIS — R80.9 MICROALBUMINURIA: ICD-10-CM

## 2023-04-11 DIAGNOSIS — D64.9 ANEMIA, UNSPECIFIED TYPE: ICD-10-CM

## 2023-04-11 DIAGNOSIS — R74.8 ELEVATED ALKALINE PHOSPHATASE LEVEL: ICD-10-CM

## 2023-04-11 DIAGNOSIS — R73.01 IMPAIRED FASTING GLUCOSE: ICD-10-CM

## 2023-04-11 LAB
ANION GAP SERPL CALC-SCNC: 13 MMOL/L (ref 7–16)
APPEARANCE UR: CLEAR
BASOPHILS # BLD AUTO: 1.1 % (ref 0–1.8)
BASOPHILS # BLD: 0.1 K/UL (ref 0–0.12)
BILIRUB UR QL STRIP.AUTO: NEGATIVE
BUN SERPL-MCNC: 28 MG/DL (ref 8–22)
CALCIUM SERPL-MCNC: 9.1 MG/DL (ref 8.5–10.5)
CHLORIDE SERPL-SCNC: 108 MMOL/L (ref 96–112)
CO2 SERPL-SCNC: 20 MMOL/L (ref 20–33)
COLOR UR: YELLOW
CREAT SERPL-MCNC: 1.2 MG/DL (ref 0.5–1.4)
EOSINOPHIL # BLD AUTO: 0.36 K/UL (ref 0–0.51)
EOSINOPHIL NFR BLD: 3.9 % (ref 0–6.9)
ERYTHROCYTE [DISTWIDTH] IN BLOOD BY AUTOMATED COUNT: 49.6 FL (ref 35.9–50)
ERYTHROCYTE [DISTWIDTH] IN BLOOD BY AUTOMATED COUNT: 50 FL (ref 35.9–50)
EST. AVERAGE GLUCOSE BLD GHB EST-MCNC: 131 MG/DL
GFR SERPLBLD CREATININE-BSD FMLA CKD-EPI: 46 ML/MIN/1.73 M 2
GLUCOSE SERPL-MCNC: 105 MG/DL (ref 65–99)
GLUCOSE UR STRIP.AUTO-MCNC: NEGATIVE MG/DL
HBA1C MFR BLD: 6.2 % (ref 4–5.6)
HCT VFR BLD AUTO: 43.4 % (ref 37–47)
HCT VFR BLD AUTO: 43.4 % (ref 37–47)
HGB BLD-MCNC: 13.8 G/DL (ref 12–16)
HGB BLD-MCNC: 13.9 G/DL (ref 12–16)
IMM GRANULOCYTES # BLD AUTO: 0.02 K/UL (ref 0–0.11)
IMM GRANULOCYTES NFR BLD AUTO: 0.2 % (ref 0–0.9)
KETONES UR STRIP.AUTO-MCNC: NEGATIVE MG/DL
LEUKOCYTE ESTERASE UR QL STRIP.AUTO: NEGATIVE
LYMPHOCYTES # BLD AUTO: 2.55 K/UL (ref 1–4.8)
LYMPHOCYTES NFR BLD: 27.7 % (ref 22–41)
MCH RBC QN AUTO: 29.1 PG (ref 27–33)
MCH RBC QN AUTO: 29.3 PG (ref 27–33)
MCHC RBC AUTO-ENTMCNC: 31.8 G/DL (ref 33.6–35)
MCHC RBC AUTO-ENTMCNC: 32 G/DL (ref 33.6–35)
MCV RBC AUTO: 91.4 FL (ref 81.4–97.8)
MCV RBC AUTO: 91.4 FL (ref 81.4–97.8)
MICRO URNS: NORMAL
MONOCYTES # BLD AUTO: 1.04 K/UL (ref 0–0.85)
MONOCYTES NFR BLD AUTO: 11.3 % (ref 0–13.4)
NEUTROPHILS # BLD AUTO: 5.12 K/UL (ref 2–7.15)
NEUTROPHILS NFR BLD: 55.8 % (ref 44–72)
NITRITE UR QL STRIP.AUTO: NEGATIVE
NRBC # BLD AUTO: 0 K/UL
NRBC BLD-RTO: 0 /100 WBC
PH UR STRIP.AUTO: 6.5 [PH] (ref 5–8)
PLATELET # BLD AUTO: 250 K/UL (ref 164–446)
PLATELET # BLD AUTO: 253 K/UL (ref 164–446)
PMV BLD AUTO: 9.7 FL (ref 9–12.9)
PMV BLD AUTO: 9.7 FL (ref 9–12.9)
POTASSIUM SERPL-SCNC: 4.6 MMOL/L (ref 3.6–5.5)
PROT UR QL STRIP: NEGATIVE MG/DL
RBC # BLD AUTO: 4.75 M/UL (ref 4.2–5.4)
RBC # BLD AUTO: 4.75 M/UL (ref 4.2–5.4)
RBC UR QL AUTO: NEGATIVE
SODIUM SERPL-SCNC: 141 MMOL/L (ref 135–145)
SP GR UR STRIP.AUTO: 1.01
UROBILINOGEN UR STRIP.AUTO-MCNC: 0.2 MG/DL
WBC # BLD AUTO: 9.2 K/UL (ref 4.8–10.8)
WBC # BLD AUTO: 9.2 K/UL (ref 4.8–10.8)

## 2023-04-11 PROCEDURE — 80053 COMPREHEN METABOLIC PANEL: CPT

## 2023-04-11 PROCEDURE — 85027 COMPLETE CBC AUTOMATED: CPT

## 2023-04-11 PROCEDURE — 84080 ASSAY ALKALINE PHOSPHATASES: CPT

## 2023-04-11 PROCEDURE — 83036 HEMOGLOBIN GLYCOSYLATED A1C: CPT

## 2023-04-11 PROCEDURE — 82306 VITAMIN D 25 HYDROXY: CPT

## 2023-04-11 PROCEDURE — 83970 ASSAY OF PARATHORMONE: CPT

## 2023-04-11 PROCEDURE — 85025 COMPLETE CBC W/AUTO DIFF WBC: CPT

## 2023-04-11 PROCEDURE — 82570 ASSAY OF URINE CREATININE: CPT

## 2023-04-11 PROCEDURE — 81003 URINALYSIS AUTO W/O SCOPE: CPT

## 2023-04-11 PROCEDURE — 82306 VITAMIN D 25 HYDROXY: CPT | Mod: 91

## 2023-04-11 PROCEDURE — 84075 ASSAY ALKALINE PHOSPHATASE: CPT

## 2023-04-11 PROCEDURE — 82043 UR ALBUMIN QUANTITATIVE: CPT

## 2023-04-11 PROCEDURE — 80048 BASIC METABOLIC PNL TOTAL CA: CPT

## 2023-04-11 PROCEDURE — 36415 COLL VENOUS BLD VENIPUNCTURE: CPT

## 2023-04-12 LAB
25(OH)D3 SERPL-MCNC: 65 NG/ML (ref 30–100)
25(OH)D3 SERPL-MCNC: 67 NG/ML (ref 30–100)
ALBUMIN SERPL BCP-MCNC: 3.7 G/DL (ref 3.2–4.9)
ALBUMIN/GLOB SERPL: 1 G/DL
ALP BONE SERPL-CCNC: 42 U/L (ref 0–55)
ALP ISOS SERPL HS-CCNC: 0 U/L
ALP LIVER SERPL-CCNC: 109 U/L (ref 0–94)
ALP SERPL-CCNC: 136 U/L (ref 30–99)
ALP SERPL-CCNC: 151 U/L (ref 40–120)
ALT SERPL-CCNC: 15 U/L (ref 2–50)
ANION GAP SERPL CALC-SCNC: 16 MMOL/L (ref 7–16)
AST SERPL-CCNC: 23 U/L (ref 12–45)
BILIRUB SERPL-MCNC: 0.5 MG/DL (ref 0.1–1.5)
BUN SERPL-MCNC: 28 MG/DL (ref 8–22)
CALCIUM ALBUM COR SERPL-MCNC: 9.2 MG/DL (ref 8.5–10.5)
CALCIUM SERPL-MCNC: 9 MG/DL (ref 8.5–10.5)
CHLORIDE SERPL-SCNC: 107 MMOL/L (ref 96–112)
CO2 SERPL-SCNC: 18 MMOL/L (ref 20–33)
CREAT SERPL-MCNC: 1.27 MG/DL (ref 0.5–1.4)
CREAT UR-MCNC: 63.05 MG/DL
GFR SERPLBLD CREATININE-BSD FMLA CKD-EPI: 43 ML/MIN/1.73 M 2
GLOBULIN SER CALC-MCNC: 3.7 G/DL (ref 1.9–3.5)
GLUCOSE SERPL-MCNC: 93 MG/DL (ref 65–99)
MICROALBUMIN UR-MCNC: <1.2 MG/DL
MICROALBUMIN/CREAT UR: NORMAL MG/G (ref 0–30)
POTASSIUM SERPL-SCNC: 4.7 MMOL/L (ref 3.6–5.5)
PROT SERPL-MCNC: 7.4 G/DL (ref 6–8.2)
PTH-INTACT SERPL-MCNC: 44.6 PG/ML (ref 14–72)
SODIUM SERPL-SCNC: 141 MMOL/L (ref 135–145)

## 2023-04-18 ENCOUNTER — OFFICE VISIT (OUTPATIENT)
Dept: MEDICAL GROUP | Facility: PHYSICIAN GROUP | Age: 83
End: 2023-04-18
Payer: MEDICARE

## 2023-04-18 VITALS
BODY MASS INDEX: 46.53 KG/M2 | DIASTOLIC BLOOD PRESSURE: 62 MMHG | WEIGHT: 237 LBS | HEIGHT: 60 IN | OXYGEN SATURATION: 92 % | RESPIRATION RATE: 20 BRPM | TEMPERATURE: 97.7 F | HEART RATE: 78 BPM | SYSTOLIC BLOOD PRESSURE: 122 MMHG

## 2023-04-18 DIAGNOSIS — R73.03 PREDIABETES: ICD-10-CM

## 2023-04-18 DIAGNOSIS — K76.0 HEPATIC STEATOSIS: ICD-10-CM

## 2023-04-18 DIAGNOSIS — E78.5 DYSLIPIDEMIA: ICD-10-CM

## 2023-04-18 DIAGNOSIS — R74.8 ELEVATED ALKALINE PHOSPHATASE LEVEL: ICD-10-CM

## 2023-04-18 PROCEDURE — 99214 OFFICE O/P EST MOD 30 MIN: CPT | Performed by: STUDENT IN AN ORGANIZED HEALTH CARE EDUCATION/TRAINING PROGRAM

## 2023-04-18 ASSESSMENT — FIBROSIS 4 INDEX: FIB4 SCORE: 1.9

## 2023-04-18 NOTE — PROGRESS NOTES
Subjective:     Chief Complaint   Patient presents with    Follow-Up     HPI:   Jammie presents today for follow-up.    Patient completed lab work for myself and her nephrologist who she sees tomorrow.  Patient has recently started making some changes in her diet.  States that she has been biking most days 20 to 30 minutes/day.  Likes to walk but typically that contributes to her low back pain so avoiding.    Current Outpatient Medications Ordered in Epic   Medication Sig Dispense Refill    telmisartan (MICARDIS) 40 MG Tab TAKE 1 TABLET BY MOUTH EVERY  Tablet 3    rosuvastatin (CRESTOR) 10 MG Tab TAKE 1 TABLET BY MOUTH EVERY DAY IN THE EVENING 100 Tablet 3    atenolol (TENORMIN) 25 MG Tab TAKE 1 TABLET BY MOUTH EVERY  Tablet 3    levothyroxine (SYNTHROID) 50 MCG Tab TAKE 1 TABLET BY MOUTH EVERY DAY 90 Tablet 3    zinc sulfate (ZINCATE) 220 (50 Zn) MG Cap Take 220 mg by mouth every day.      Ascorbic Acid (VITAMIN C) 1000 MG Tab Take  by mouth.      acetaminophen (TYLENOL) 325 MG Tab Take 2 Tabs by mouth every 6 hours as needed (Mild Pain; (Pain scale 1-3); Temp greater than 100.5 F). 30 Tab 0    cyanocobalamin (VITAMIN B-12) 500 MCG Tab Take 500 mcg by mouth every day.      Cholecalciferol (VITAMIN D-3) 25 MCG (1000 UT) Cap Take 1,000 Units by mouth every day.      therapeutic multivitamin-minerals (THERAGRAN-M) TABS Take 1 Tab by mouth every morning.       No current [facility]-ordered facility-administered medications on file.     ROS:  Gen: no fevers/chills, no changes in weight  Eyes: no changes in vision  ENT: no sore throat  Pulm: no sob, no cough  CV: no chest pain, no palpitations, no lower extremity edema  GI: no nausea/vomiting, no diarrhea  : no dysuria  MSk: no myalgias.  No lower extremity pain or claudication.  Occasional back pain with walking but not with biking.  Skin: no rash  Neuro: no headaches, no numbness/tingling  Heme/Lymph: no easy bruising    Objective:     Exam:  /62 (BP  Location: Right arm, Patient Position: Sitting, BP Cuff Size: Adult)   Pulse 78   Temp 36.5 °C (97.7 °F) (Temporal)   Resp 20   Ht 1.524 m (5')   Wt 108 kg (237 lb)   SpO2 92%   BMI 46.29 kg/m²  Body mass index is 46.29 kg/m².    Physical Exam:  Constitutional: Well-developed and well-nourished. No acute distress.   Skin: Skin is warm and dry. No rash noted.  Head: Atraumatic without lesions.  Eyes: Conjunctivae and extraocular motions are normal. Pupils are equal, round. No scleral icterus.   Mouth/Throat: Wearing mask.  Neck: Supple, trachea midline.  Cardiovascular: Regular rate and rhythm, S1 and S2 without murmur, rubs, or gallops.  Lungs: Normal inspiratory effort, CTA bilaterally, no wheezes/rhonchi/rales  Abdomen: Obese. Soft, non tender, and without distention. Active bowel sounds. No rebound, guarding, masses or HSM.  Extremities: No cyanosis, clubbing, erythema, nor edema. +2 dorsalis pedis pulses bilat.  Neurological: Alert and oriented x 3. No gross/focal deficits.  Psychiatric:  Behavior, mood, and affect are appropriate.    Labs: Reviewed from 4/11/2023  Imaging: Reviewed from [facility] 11/2022    Assessment & Plan:     82 y.o. female with the following -     1. Elevated alkaline phosphatase level  - Comp Metabolic Panel; Future  2. Hepatic steatosis  Chronic, stable.  Discussed importance of healthy diet with intention for weight loss, typically 5 to 7%.  Will monitor in 6 months with lab work, if worsening consider repeat ultrasound.    3. BMI 45.0-49.9, adult (HCC)  Chronic, discussed ways to improve with diet/exercise.  Reviewed her diet and set a SMART goal to lose 10 pounds in the next 6 months.  Patient prefers to try on her own rather than have a referral for assistance. Trend at f/u, declines sooner appt. discussed that we could consider semaglutide given her comorbid prediabetes as below-declines for now.    4. Prediabetes  Chronic, discussed ways to improve with diet/exercise. Trend  with annual labs.  - Comp Metabolic Panel; Future  - HEMOGLOBIN A1C; Future    5. Dyslipidemia  Chronic, controlled. Continue medication(s) as below. Unable to tolerated aspirin.   - Comp Metabolic Panel; Future  - Lipid Profile; Future  - TSH WITH REFLEX TO FT4; Future    rosuvastatin (CRESTOR) 10 MG Tab, TAKE 1 TABLET BY MOUTH EVERY DAY IN THE EVENING, Disp: 100 Tablet, Rfl: 3    Return in about 6 months (around 10/18/2023), or if symptoms worsen or fail to improve.    Please note that this dictation was created using voice recognition software. I have made every reasonable attempt to correct obvious errors, but I expect that there are errors of grammar and possibly content that I did not discover before finalizing the note.

## 2023-04-18 NOTE — PATIENT INSTRUCTIONS
Ideal blood pressure <130/80 and at least <140/90.    SMART Goals:  10 pounds in 6 months    Veggies  -half your plate  -avoid ice garcia lettuce (mixed greens, spinach, yamilka)    fasting labs due October 2023    Ideal blood pressure <130/80 and at least <140/90.

## 2023-04-19 ENCOUNTER — OFFICE VISIT (OUTPATIENT)
Dept: NEPHROLOGY | Facility: MEDICAL CENTER | Age: 83
End: 2023-04-19
Payer: MEDICARE

## 2023-04-19 VITALS
WEIGHT: 240 LBS | SYSTOLIC BLOOD PRESSURE: 118 MMHG | DIASTOLIC BLOOD PRESSURE: 74 MMHG | OXYGEN SATURATION: 90 % | BODY MASS INDEX: 47.12 KG/M2 | HEIGHT: 60 IN | TEMPERATURE: 98.2 F | HEART RATE: 87 BPM

## 2023-04-19 DIAGNOSIS — I10 ESSENTIAL HYPERTENSION: ICD-10-CM

## 2023-04-19 DIAGNOSIS — D64.9 ANEMIA, UNSPECIFIED TYPE: ICD-10-CM

## 2023-04-19 DIAGNOSIS — N18.31 STAGE 3A CHRONIC KIDNEY DISEASE: ICD-10-CM

## 2023-04-19 DIAGNOSIS — E55.9 VITAMIN D DEFICIENCY DISEASE: ICD-10-CM

## 2023-04-19 DIAGNOSIS — R80.9 MICROALBUMINURIA: ICD-10-CM

## 2023-04-19 PROCEDURE — 99214 OFFICE O/P EST MOD 30 MIN: CPT | Performed by: INTERNAL MEDICINE

## 2023-04-19 ASSESSMENT — ENCOUNTER SYMPTOMS
VOMITING: 0
WEIGHT LOSS: 0
PALPITATIONS: 0
SHORTNESS OF BREATH: 0
FEVER: 0
DIARRHEA: 0
CHILLS: 0
SINUS PAIN: 0
FLANK PAIN: 0
HEMOPTYSIS: 0
WHEEZING: 0
ABDOMINAL PAIN: 0
COUGH: 0
ORTHOPNEA: 0
EYES NEGATIVE: 1
NAUSEA: 0

## 2023-04-19 ASSESSMENT — FIBROSIS 4 INDEX: FIB4 SCORE: 1.9

## 2023-04-19 NOTE — PROGRESS NOTES
Subjective:      Jammie Berry is a 82 y.o. female who presents with Hypertension Follow-up and Chronic Kidney Disease            Chronic Kidney Disease  Pertinent negatives include no abdominal pain, chest pain, chills, congestion, coughing, fever, nausea or vomiting.   Jammie is coming today for f/u of CKD IIIa, HTN  Doing well , no complaints  No difficulties to urinate  No dysuria/hematuria/flank pain  No edema  CKD IIIa -creat level slightly worse -to monitor  HTN:BP well controlled, compliant to low Na diet, medications    Review of Systems   Constitutional:  Negative for chills, fever, malaise/fatigue and weight loss.   HENT:  Negative for congestion, hearing loss and sinus pain.    Eyes: Negative.    Respiratory:  Negative for cough, hemoptysis, shortness of breath and wheezing.    Cardiovascular:  Negative for chest pain, palpitations, orthopnea and leg swelling.   Gastrointestinal:  Negative for abdominal pain, diarrhea, nausea and vomiting.   Genitourinary:  Negative for dysuria, flank pain, frequency, hematuria and urgency.   Skin: Negative.    All other systems reviewed and are negative.  Past Medical History:   Diagnosis Date    Abnormal Pap smear of cervix     Dr. 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       Family History   Problem Relation Age of Onset    Heart Attack Mother     Alcohol abuse Mother         etoh    Lung Disease Father         smoker    Alcohol abuse Father         etoh    Heart Attack Father     Hypertension Sister     Hyperlipidemia Sister     Lung Disease Sister         smoker    Colon Cancer Sister     Lung Disease Maternal Aunt     Heart Disease Maternal Aunt     Hypertension Maternal Aunt     Hyperlipidemia Maternal Aunt     Stroke Maternal Grandmother     Other Maternal  Grandmother         TB of the brain    Stroke Maternal Grandfather     Colon Cancer Paternal Grandmother     No Known Problems Paternal Grandfather        Social History     Socioeconomic History    Marital status:     Number of children: 0   Occupational History    Occupation: retired CNA   Tobacco Use    Smoking status: Former     Packs/day: 0.25     Years: 12.00     Pack years: 3.00     Types: Cigarettes     Quit date: 1977     Years since quittin.3    Smokeless tobacco: Never    Tobacco comments:     Started smoking at age 25 (social smoker)   Vaping Use    Vaping Use: Never used   Substance and Sexual Activity    Alcohol use: No     Alcohol/week: 0.0 oz    Drug use: No    Sexual activity: Never     Partners: Male     Comment: vaginal atrophy-dysparunia          Objective:     /74 (BP Location: Right arm, Patient Position: Sitting, BP Cuff Size: Adult)   Pulse 87   Temp 36.8 °C (98.2 °F) (Temporal)   Ht 1.524 m (5')   Wt 109 kg (240 lb)   SpO2 90%   BMI 46.87 kg/m²      Physical Exam  Vitals reviewed.   Constitutional:       General: She is not in acute distress.     Appearance: Normal appearance. She is well-developed. She is obese. She is not diaphoretic.   HENT:      Head: Normocephalic and atraumatic.      Nose: Nose normal.      Mouth/Throat:      Mouth: Mucous membranes are moist.      Pharynx: Oropharynx is clear.   Eyes:      Extraocular Movements: Extraocular movements intact.      Conjunctiva/sclera: Conjunctivae normal.      Pupils: Pupils are equal, round, and reactive to light.   Cardiovascular:      Rate and Rhythm: Normal rate and regular rhythm.      Pulses: Normal pulses.      Heart sounds: Normal heart sounds.   Pulmonary:      Effort: Pulmonary effort is normal. No respiratory distress.      Breath sounds: Normal breath sounds. No wheezing or rales.   Abdominal:      General: Bowel sounds are normal. There is no distension.      Palpations: Abdomen is soft. There is  no mass.      Tenderness: There is no abdominal tenderness. There is no right CVA tenderness or left CVA tenderness.   Musculoskeletal:      Cervical back: Normal range of motion and neck supple.      Right lower leg: No edema.      Left lower leg: No edema.   Skin:     General: Skin is warm.      Coloration: Skin is not pale.      Findings: No erythema or rash.   Neurological:      General: No focal deficit present.      Mental Status: She is alert and oriented to person, place, and time.      Cranial Nerves: No cranial nerve deficit.      Coordination: Coordination normal.   Psychiatric:         Mood and Affect: Mood normal.         Behavior: Behavior normal.         Thought Content: Thought content normal.         Judgment: Judgment normal.             Laboratory results reviewed; d/w pt  Lab Results   Component Value Date/Time    CREATININE 1.20 04/11/2023 08:30 AM    POTASSIUM 4.6 04/11/2023 08:30 AM       Assessment/Plan:     1.  CKD IIIa       Creat level slightly worse -to monitor       Keep well hydrated    2. Essential hypertension      BP well controlled      Continue current treatment    3. Vitamin D deficiency disease      Vit D level and PTH well controlled WNL    4. Anemia, unspecified type      Hb stable -WNL    5. Microalbuminuria:-negative    Recs: continue current treatment,             low salt diet,             keep well hydrated,             avoid NSAID's,             weight loss             F/u in 6 months

## 2023-10-04 ENCOUNTER — TELEPHONE (OUTPATIENT)
Dept: HEALTH INFORMATION MANAGEMENT | Facility: OTHER | Age: 83
End: 2023-10-04
Payer: MEDICARE

## 2023-10-04 NOTE — TELEPHONE ENCOUNTER
Pt was added to the [facility] waiting list, because she would like to continue with her medical care over there.     Pt completed GABI on 4/4/2023. At Risk Patient Summary, it's not showing the information.

## 2023-10-11 ENCOUNTER — TELEPHONE (OUTPATIENT)
Dept: HEALTH INFORMATION MANAGEMENT | Facility: OTHER | Age: 83
End: 2023-10-11
Payer: MEDICARE

## 2023-10-20 ENCOUNTER — HOSPITAL ENCOUNTER (OUTPATIENT)
Dept: LAB | Facility: MEDICAL CENTER | Age: 83
End: 2023-10-20
Attending: INTERNAL MEDICINE
Payer: MEDICARE

## 2023-10-20 DIAGNOSIS — R80.9 MICROALBUMINURIA: ICD-10-CM

## 2023-10-20 DIAGNOSIS — N18.31 STAGE 3A CHRONIC KIDNEY DISEASE: ICD-10-CM

## 2023-10-20 LAB
ANION GAP SERPL CALC-SCNC: 10 MMOL/L (ref 7–16)
BUN SERPL-MCNC: 14 MG/DL (ref 8–22)
CALCIUM SERPL-MCNC: 9 MG/DL (ref 8.5–10.5)
CHLORIDE SERPL-SCNC: 105 MMOL/L (ref 96–112)
CO2 SERPL-SCNC: 24 MMOL/L (ref 20–33)
CREAT SERPL-MCNC: 0.96 MG/DL (ref 0.5–1.4)
CREAT UR-MCNC: 252.33 MG/DL
GFR SERPLBLD CREATININE-BSD FMLA CKD-EPI: 59 ML/MIN/1.73 M 2
GLUCOSE SERPL-MCNC: 105 MG/DL (ref 65–99)
MICROALBUMIN UR-MCNC: 5.9 MG/DL
MICROALBUMIN/CREAT UR: 23 MG/G (ref 0–30)
POTASSIUM SERPL-SCNC: 4.1 MMOL/L (ref 3.6–5.5)
SODIUM SERPL-SCNC: 139 MMOL/L (ref 135–145)

## 2023-10-20 PROCEDURE — 36415 COLL VENOUS BLD VENIPUNCTURE: CPT

## 2023-10-20 PROCEDURE — 80048 BASIC METABOLIC PNL TOTAL CA: CPT

## 2023-10-20 PROCEDURE — 82043 UR ALBUMIN QUANTITATIVE: CPT

## 2023-10-20 PROCEDURE — 82570 ASSAY OF URINE CREATININE: CPT

## 2023-10-25 ENCOUNTER — APPOINTMENT (OUTPATIENT)
Dept: NEPHROLOGY | Facility: MEDICAL CENTER | Age: 83
End: 2023-10-25
Payer: MEDICARE

## 2023-11-15 ENCOUNTER — OFFICE VISIT (OUTPATIENT)
Dept: NEPHROLOGY | Facility: MEDICAL CENTER | Age: 83
End: 2023-11-15
Payer: MEDICARE

## 2023-11-15 VITALS
WEIGHT: 234 LBS | DIASTOLIC BLOOD PRESSURE: 74 MMHG | TEMPERATURE: 97.9 F | BODY MASS INDEX: 45.94 KG/M2 | HEIGHT: 60 IN | OXYGEN SATURATION: 94 % | SYSTOLIC BLOOD PRESSURE: 124 MMHG | RESPIRATION RATE: 14 BRPM | HEART RATE: 84 BPM

## 2023-11-15 DIAGNOSIS — I10 ESSENTIAL HYPERTENSION: ICD-10-CM

## 2023-11-15 DIAGNOSIS — D64.9 ANEMIA, UNSPECIFIED TYPE: ICD-10-CM

## 2023-11-15 DIAGNOSIS — R80.9 MICROALBUMINURIA: ICD-10-CM

## 2023-11-15 DIAGNOSIS — N18.31 STAGE 3A CHRONIC KIDNEY DISEASE: ICD-10-CM

## 2023-11-15 DIAGNOSIS — E55.9 VITAMIN D DEFICIENCY DISEASE: ICD-10-CM

## 2023-11-15 PROCEDURE — 99213 OFFICE O/P EST LOW 20 MIN: CPT | Performed by: INTERNAL MEDICINE

## 2023-11-15 PROCEDURE — 3074F SYST BP LT 130 MM HG: CPT | Performed by: INTERNAL MEDICINE

## 2023-11-15 PROCEDURE — 3078F DIAST BP <80 MM HG: CPT | Performed by: INTERNAL MEDICINE

## 2023-11-15 ASSESSMENT — ENCOUNTER SYMPTOMS
VOMITING: 0
EYES NEGATIVE: 1
NAUSEA: 0
HEMOPTYSIS: 0
SINUS PAIN: 0
FEVER: 0
ORTHOPNEA: 0
ABDOMINAL PAIN: 0
WEIGHT LOSS: 0
SHORTNESS OF BREATH: 0
FLANK PAIN: 0
PALPITATIONS: 0
CHILLS: 0
COUGH: 0
DIARRHEA: 0
WHEEZING: 0

## 2023-11-15 ASSESSMENT — FIBROSIS 4 INDEX: FIB4 SCORE: 1.92

## 2023-11-15 NOTE — PATIENT INSTRUCTIONS
Continue current treatment  Keep well hydrated  Low salt diet  Monitor BP and call clinic if BP > 135/85

## 2023-11-15 NOTE — PROGRESS NOTES
Subjective:      Jammie Berry is a 83 y.o. female who presents with Follow-Up and Chronic Kidney Disease            Chronic Kidney Disease  Pertinent negatives include no abdominal pain, chest pain, chills, congestion, coughing, fever, nausea or vomiting.     Jammie is coming today for f/u of CKD IIIa, HTN  Doing well , no complaints  No difficulties to urinate  No dysuria/hematuria/flank pain  No edema  CKD IIIa -creat level improved  -to monitor  HTN:BP well controlled, compliant to low Na diet, medications    Review of Systems   Constitutional:  Negative for chills, fever, malaise/fatigue and weight loss.   HENT:  Negative for congestion, hearing loss and sinus pain.    Eyes: Negative.    Respiratory:  Negative for cough, hemoptysis, shortness of breath and wheezing.    Cardiovascular:  Negative for chest pain, palpitations, orthopnea and leg swelling.   Gastrointestinal:  Negative for abdominal pain, diarrhea, nausea and vomiting.   Genitourinary:  Negative for dysuria, flank pain, frequency, hematuria and urgency.   Skin: Negative.    All other systems reviewed and are negative.    Past Medical History:   Diagnosis Date    Abnormal Pap smear of cervix     Dr. 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       Family History   Problem Relation Age of Onset    Heart Attack Mother     Alcohol abuse Mother         etoh    Lung Disease Father         smoker    Alcohol abuse Father         etoh    Heart Attack Father     Hypertension Sister     Hyperlipidemia Sister     Lung Disease Sister         smoker    Colon Cancer Sister     Lung Disease Maternal Aunt     Heart Disease Maternal Aunt     Hypertension Maternal Aunt     Hyperlipidemia Maternal Aunt     Stroke Maternal Grandmother     Other Maternal Grandmother          TB of the brain    Stroke Maternal Grandfather     Colon Cancer Paternal Grandmother     No Known Problems Paternal Grandfather        Social History     Socioeconomic History    Marital status:     Number of children: 0   Occupational History    Occupation: retired CNA   Tobacco Use    Smoking status: Former     Current packs/day: 0.00     Average packs/day: 0.3 packs/day for 12.0 years (3.0 ttl pk-yrs)     Types: Cigarettes     Start date: 1965     Quit date: 1977     Years since quittin.9    Smokeless tobacco: Never    Tobacco comments:     Started smoking at age 25 (social smoker)   Vaping Use    Vaping Use: Never used   Substance and Sexual Activity    Alcohol use: No     Alcohol/week: 0.0 oz    Drug use: No    Sexual activity: Never     Partners: Male     Comment: vaginal atrophy-dysparunia     Social Determinants of Health     Food Insecurity: No Food Insecurity (2020)    Hunger Vital Sign     Worried About Running Out of Food in the Last Year: Never true     Ran Out of Food in the Last Year: Never true   Transportation Needs: No Transportation Needs (2020)    PRAPARE - Transportation     Lack of Transportation (Medical): No     Lack of Transportation (Non-Medical): No          Objective:     /74 (BP Location: Right arm, Patient Position: Sitting, BP Cuff Size: Adult)   Pulse 84   Temp 36.6 °C (97.9 °F) (Temporal)   Resp 14   Ht 1.524 m (5')   Wt 106 kg (234 lb)   SpO2 94%   BMI 45.70 kg/m²      Physical Exam  Vitals reviewed.   Constitutional:       General: She is not in acute distress.     Appearance: Normal appearance. She is well-developed. She is not diaphoretic.   HENT:      Head: Normocephalic and atraumatic.      Nose: Nose normal.      Mouth/Throat:      Mouth: Mucous membranes are moist.      Pharynx: Oropharynx is clear.   Eyes:      Extraocular Movements: Extraocular movements intact.      Conjunctiva/sclera: Conjunctivae normal.      Pupils:  Pupils are equal, round, and reactive to light.   Cardiovascular:      Rate and Rhythm: Normal rate and regular rhythm.      Pulses: Normal pulses.      Heart sounds: Normal heart sounds.   Pulmonary:      Effort: Pulmonary effort is normal. No respiratory distress.      Breath sounds: Normal breath sounds. No wheezing or rales.   Abdominal:      General: Bowel sounds are normal. There is no distension.      Palpations: Abdomen is soft. There is no mass.      Tenderness: There is no abdominal tenderness. There is no right CVA tenderness or left CVA tenderness.   Musculoskeletal:      Cervical back: Normal range of motion and neck supple.      Right lower leg: No edema.      Left lower leg: No edema.   Skin:     General: Skin is warm.      Coloration: Skin is not pale.      Findings: No erythema or rash.   Neurological:      General: No focal deficit present.      Mental Status: She is alert and oriented to person, place, and time.      Cranial Nerves: No cranial nerve deficit.      Coordination: Coordination normal.   Psychiatric:         Mood and Affect: Mood normal.         Behavior: Behavior normal.         Thought Content: Thought content normal.         Judgment: Judgment normal.               Laboratory results reviewed; d/w pt   Latest Reference Range & Units 04/11/23 08:29 04/11/23 08:30 10/20/23 08:46   WBC 4.8 - 10.8 K/uL 9.2 9.2    RBC 4.20 - 5.40 M/uL 4.75 4.75    Hemoglobin 12.0 - 16.0 g/dL 13.8 13.9    Hematocrit 37.0 - 47.0 % 43.4 43.4    MCV 81.4 - 97.8 fL 91.4 91.4    MCH 27.0 - 33.0 pg 29.1 29.3    MCHC 33.6 - 35.0 g/dL 31.8 (L) 32.0 (L)    RDW 35.9 - 50.0 fL 50.0 49.6    Platelet Count 164 - 446 K/uL 253 250    MPV 9.0 - 12.9 fL 9.7 9.7    Neutrophils-Polys 44.00 - 72.00 % 55.80     Neutrophils (Absolute) 2.00 - 7.15 K/uL 5.12     Lymphocytes 22.00 - 41.00 % 27.70     Lymphs (Absolute) 1.00 - 4.80 K/uL 2.55     Monocytes 0.00 - 13.40 % 11.30     Monos (Absolute) 0.00 - 0.85 K/uL 1.04 (H)      Eosinophils 0.00 - 6.90 % 3.90     Eos (Absolute) 0.00 - 0.51 K/uL 0.36     Basophils 0.00 - 1.80 % 1.10     Baso (Absolute) 0.00 - 0.12 K/uL 0.10     Immature Granulocytes 0.00 - 0.90 % 0.20     Immature Granulocytes (abs) 0.00 - 0.11 K/uL 0.02     Nucleated RBC /100 WBC 0.00     NRBC (Absolute) K/uL 0.00     Sodium 135 - 145 mmol/L 141 141 139   Potassium 3.6 - 5.5 mmol/L 4.7 4.6 4.1   Chloride 96 - 112 mmol/L 107 108 105   Co2 20 - 33 mmol/L 18 (L) 20 24   Anion Gap 7.0 - 16.0  16.0 13.0 10.0   Glucose 65 - 99 mg/dL 93 105 (H) 105 (H)   Bun 8 - 22 mg/dL 28 (H) 28 (H) 14   Creatinine 0.50 - 1.40 mg/dL 1.27 1.20 0.96   GFR (CKD-EPI) >60 mL/min/1.73 m 2 43 ! 46 ! 59 !   Calcium 8.5 - 10.5 mg/dL 9.0 9.1 9.0   Correct Calcium 8.5 - 10.5 mg/dL 9.2     AST(SGOT) 12 - 45 U/L 23     ALT(SGPT) 2 - 50 U/L 15     Alkaline Phosphatase 30 - 99 U/L 136 (H)     Total Bilirubin 0.1 - 1.5 mg/dL 0.5     Albumin 3.2 - 4.9 g/dL 3.7     Total Protein 6.0 - 8.2 g/dL 7.4     Globulin 1.9 - 3.5 g/dL 3.7 (H)     A-G Ratio g/dL 1.0     Glycohemoglobin 4.0 - 5.6 % 6.2 (H)     Estim. Avg Glu mg/dL 131     Alkaline Phosphatase 40 - 120 U/L 151 (H)     Bone Fractions 0 - 55 U/L 42     Liver Fractions 0 - 94 U/L 109 (H)     Alk Phos Other Calc U/L 0     Micro Alb Creat Ratio 0 - 30 mg/g  see below 23   Creatinine, Urine mg/dL  63.05 252.33   Microalbumin, Urine Random mg/dL  <1.2 5.9   Urobilinogen, Urine Negative   0.2    Color   Yellow    Character   Clear    Specific Gravity <1.035   1.013    Ph 5.0 - 8.0   6.5    Glucose Negative mg/dL  Negative    Ketones Negative mg/dL  Negative    Bilirubin Negative   Negative    Occult Blood Negative   Negative    Protein Negative mg/dL  Negative    Nitrite Negative   Negative    Leukocyte Esterase Negative   Negative    Micro Urine Req   see below    25-Hydroxy   Vitamin D 25 30 - 100 ng/mL 67 65    Pth, Intact 14.0 - 72.0 pg/mL  44.6    (L): Data is abnormally low  (H): Data is abnormally high  !:  Data is abnormal    Assessment/Plan:     1.  CKD IIIa       Creat level improving -to monitor       Keep well hydrated    2. Essential hypertension      BP well controlled      Continue current treatment    3. Vitamin D deficiency disease      Vit D level and PTH well controlled WNL    4. Anemia, unspecified type      Hb stable -WNL    5. Microalbuminuria:-negative    Recs: continue current treatment,             low salt diet,             keep well hydrated,             avoid NSAID's,             weight loss             F/u in 6 months

## 2023-12-04 DIAGNOSIS — E78.00 ELEVATED LDL CHOLESTEROL LEVEL: ICD-10-CM

## 2023-12-04 DIAGNOSIS — E03.9 ACQUIRED HYPOTHYROIDISM: ICD-10-CM

## 2023-12-04 DIAGNOSIS — I70.0 ATHEROSCLEROSIS OF AORTA (HCC): ICD-10-CM

## 2023-12-04 RX ORDER — LEVOTHYROXINE SODIUM 0.05 MG/1
50 TABLET ORAL
Qty: 90 TABLET | Refills: 3 | Status: SHIPPED | OUTPATIENT
Start: 2023-12-04

## 2023-12-04 RX ORDER — ROSUVASTATIN CALCIUM 10 MG/1
10 TABLET, COATED ORAL EVERY EVENING
Qty: 100 TABLET | Refills: 3 | Status: SHIPPED | OUTPATIENT
Start: 2023-12-04

## 2023-12-04 NOTE — TELEPHONE ENCOUNTER
Received request via: Patient    Was the patient seen in the last year in this department? Yes    Does the patient have an active prescription (recently filled or refills available) for medication(s) requested? No    Does the patient have [facility] and need 100 day supply (blood pressure, diabetes and cholesterol meds only)? Yes, quantity updated to 100 days    Pt has an appointment to for [facility] with new PCP Simona Rogers.  She will running out of her meds before her appointment.  Rx request for refill .

## 2024-01-03 ENCOUNTER — TELEPHONE (OUTPATIENT)
Dept: MEDICAL GROUP | Facility: PHYSICIAN GROUP | Age: 84
End: 2024-01-03
Payer: MEDICARE

## 2024-01-16 ENCOUNTER — OFFICE VISIT (OUTPATIENT)
Dept: MEDICAL GROUP | Facility: PHYSICIAN GROUP | Age: 84
End: 2024-01-16
Payer: MEDICARE

## 2024-01-16 ENCOUNTER — APPOINTMENT (OUTPATIENT)
Dept: RADIOLOGY | Facility: IMAGING CENTER | Age: 84
End: 2024-01-16
Payer: MEDICARE

## 2024-01-16 VITALS
BODY MASS INDEX: 45.35 KG/M2 | DIASTOLIC BLOOD PRESSURE: 70 MMHG | OXYGEN SATURATION: 98 % | SYSTOLIC BLOOD PRESSURE: 132 MMHG | TEMPERATURE: 97.8 F | HEIGHT: 60 IN | HEART RATE: 82 BPM | WEIGHT: 231 LBS | RESPIRATION RATE: 20 BRPM

## 2024-01-16 DIAGNOSIS — M54.50 CHRONIC BILATERAL LOW BACK PAIN WITHOUT SCIATICA: ICD-10-CM

## 2024-01-16 DIAGNOSIS — I10 ESSENTIAL HYPERTENSION: ICD-10-CM

## 2024-01-16 DIAGNOSIS — E66.01 SEVERE OBESITY (BMI >= 40) (HCC): ICD-10-CM

## 2024-01-16 DIAGNOSIS — Z13.0 SCREENING FOR DEFICIENCY ANEMIA: ICD-10-CM

## 2024-01-16 DIAGNOSIS — E78.5 DYSLIPIDEMIA: ICD-10-CM

## 2024-01-16 DIAGNOSIS — G89.29 CHRONIC BILATERAL LOW BACK PAIN WITHOUT SCIATICA: ICD-10-CM

## 2024-01-16 DIAGNOSIS — R73.03 PREDIABETES: ICD-10-CM

## 2024-01-16 DIAGNOSIS — Z13.6 SCREENING FOR CARDIOVASCULAR CONDITION: ICD-10-CM

## 2024-01-16 DIAGNOSIS — E03.9 ACQUIRED HYPOTHYROIDISM: ICD-10-CM

## 2024-01-16 DIAGNOSIS — R73.09 ELEVATED GLUCOSE LEVEL: ICD-10-CM

## 2024-01-16 DIAGNOSIS — I73.9 PAD (PERIPHERAL ARTERY DISEASE) (HCC): ICD-10-CM

## 2024-01-16 DIAGNOSIS — N18.31 HYPERTENSIVE KIDNEY DISEASE WITH STAGE 3A CHRONIC KIDNEY DISEASE: ICD-10-CM

## 2024-01-16 DIAGNOSIS — E66.01 MORBID (SEVERE) OBESITY DUE TO EXCESS CALORIES (HCC): ICD-10-CM

## 2024-01-16 DIAGNOSIS — I12.9 HYPERTENSIVE KIDNEY DISEASE WITH STAGE 3A CHRONIC KIDNEY DISEASE: ICD-10-CM

## 2024-01-16 DIAGNOSIS — I10 PRIMARY HYPERTENSION: ICD-10-CM

## 2024-01-16 DIAGNOSIS — N18.31 STAGE 3A CHRONIC KIDNEY DISEASE: ICD-10-CM

## 2024-01-16 DIAGNOSIS — K76.0 HEPATIC STEATOSIS: ICD-10-CM

## 2024-01-16 DIAGNOSIS — G89.29 CHRONIC PAIN OF LEFT KNEE: ICD-10-CM

## 2024-01-16 DIAGNOSIS — M25.562 CHRONIC PAIN OF LEFT KNEE: ICD-10-CM

## 2024-01-16 DIAGNOSIS — I70.0 ATHEROSCLEROSIS OF AORTA (HCC): ICD-10-CM

## 2024-01-16 DIAGNOSIS — N18.32 STAGE 3B CHRONIC KIDNEY DISEASE: ICD-10-CM

## 2024-01-16 DIAGNOSIS — Z78.0 POSTMENOPAUSAL: ICD-10-CM

## 2024-01-16 DIAGNOSIS — E55.9 VITAMIN D DEFICIENCY: ICD-10-CM

## 2024-01-16 PROCEDURE — 73562 X-RAY EXAM OF KNEE 3: CPT | Mod: TC,LT | Performed by: RADIOLOGY

## 2024-01-16 PROCEDURE — 3075F SYST BP GE 130 - 139MM HG: CPT

## 2024-01-16 PROCEDURE — 3078F DIAST BP <80 MM HG: CPT

## 2024-01-16 PROCEDURE — 99215 OFFICE O/P EST HI 40 MIN: CPT

## 2024-01-16 RX ORDER — TELMISARTAN 40 MG/1
40 TABLET ORAL
Qty: 100 TABLET | Refills: 3 | Status: SHIPPED | OUTPATIENT
Start: 2024-01-16

## 2024-01-16 RX ORDER — ATENOLOL 25 MG/1
TABLET ORAL
Qty: 100 TABLET | Refills: 3 | Status: SHIPPED | OUTPATIENT
Start: 2024-01-16

## 2024-01-16 ASSESSMENT — ENCOUNTER SYMPTOMS: BACK PAIN: 1

## 2024-01-16 ASSESSMENT — FIBROSIS 4 INDEX: FIB4 SCORE: 1.92

## 2024-01-16 ASSESSMENT — PATIENT HEALTH QUESTIONNAIRE - PHQ9: CLINICAL INTERPRETATION OF PHQ2 SCORE: 0

## 2024-01-16 NOTE — ASSESSMENT & PLAN NOTE
Chronic, stable. Denies symptoms of hyper/hypothyroid. Will repeat tsh annually, continue levothyroxine 50 mcg daily on an empty stomach.

## 2024-01-16 NOTE — PROGRESS NOTES
HCC Gap Form    Diagnosis: E66.01 - Morbid (severe) obesity due to excess calories (HCC)  Z68.42 - Body mass index (BMI) 45.0-49.9, adult (HCC)  The current BMI is 45.11 kg/m2 as of 01/16/24 15:35 PST  Assessment and plan: Chronic, improving. Encouraged healthy diet and physical activity changes with a goal of weight loss. Follow up at least annually.  Diagnosis to address: I70.0 - Atherosclerosis of aorta (HCC)  Assessment and plan: Chronic, stable. Continue with current defined treatment plan: continue rosuvastatin 10 mg daily. Follow-up at least annually.  Diagnosis: I73.9 - PAD (peripheral artery disease) (HCC)  Assessment and plan: Chronic, exacerbated. Treatment and follow up: continue healthy lifestyle recommendations, statins, blood pressure control. Pt reports has had blister recurring to LLE, hyperpigmentation noted to LLE.   3/30/22: QUANTA JON SCREENING R .73 /  L  1.11    Will provide referral to vascular  Diagnosis: I12.9, N18.31 - Hypertensive kidney disease with stage 3a chronic kidney disease (HCC)  Assessment and plan: Chronic, stable, as based on today's assessment and impact on other conditions evaluated today. Continue with current treatment plan: continue to avoid nephrotoxins, continue blood pressure control. Follow-up with specialist as directed, but at least annually.  Diagnosis: N18.31 - Stage 3a chronic kidney disease (HCC)  Assessment and plan: Chronic, stable, as based on today's assessment and impact on other conditions evaluated today. Continue with current treatment plan: continue prescribed medications for blood pressure, avoid nephrotoxins. Follow-up with specialist as directed, but at least annually.  Last edited 01/16/24 15:45 PST by LANE Armendariz.           Subjective:     CC: Diagnoses of PAD (peripheral artery disease) (HCC), Morbid (severe) obesity due to excess calories (HCC), Body mass index (BMI) 45.0-49.9, adult (HCC), Atherosclerosis of aorta (HCC),  Hypertensive kidney disease with stage 3a chronic kidney disease (HCC), Stage 3a chronic kidney disease (HCC), Screening for cardiovascular condition, Acquired hypothyroidism, Dyslipidemia, Hepatic steatosis, Elevated glucose level, Screening for deficiency anemia, Postmenopausal, Vitamin D deficiency, Primary hypertension, Stage 3b chronic kidney disease (HCC), Essential hypertension, BMI 45.0-49.9, adult (HCC), Severe obesity (BMI >= 40) (HCC), Prediabetes, Chronic pain of left knee, and Chronic bilateral low back pain without sciatica were pertinent to this visit.    Pt presents today to establish care with me, prior PCP Dr. Munoz. Pt prefers this location.  She is also followed by nephrology for CKD.    Reports she has had a recurring blister- lesion to LLE which started 8/2023. She has had 2 blister-type lesions appear to ankle area, accompanied by fever/chills. Reports blister pops/drains clear fluid, and symptoms resolve. Presents today with hyperpigmentation circumferentially to LLE.    She is , she has 2 step children, who live in [place], one sister who she is close to- she lives in [place].   She enjoys going to [employer], [employer], talking on the phone, cooking. Enjoys riding stationary bike.    HPI:   Jammie presents today with    Problem   Chronic Pain of Left Knee   Prediabetes   Pad (Peripheral Artery Disease) (Hcc)   Dyslipidemia   Chronic Bilateral Low Back Pain Without Sciatica   Severe Obesity (Bmi >= 40) (Hcc)   Primary Hypertension    Stable on ARB and Beta blocker     Acquired Hypothyroidism    Diagnosed 2014. Managed with levothyroxine 50 mcg since.  Last TSH 1.24 in August 2016. 3.17 TSH level 2.01     Morbid Obesity (Hcc) (Resolved)   Bmi 45.0-49.9, Adult (Hcc) (Resolved)       ROS:  Review of Systems   Musculoskeletal:  Positive for back pain and joint pain (left knee).   Skin:         Recurring blister to LLE, hyperpigmentation    All other systems reviewed and are  negative.      Objective:     Exam:  /70 (BP Location: Right arm, Patient Position: Sitting, BP Cuff Size: Large adult)   Pulse 82   Temp 36.6 °C (97.8 °F) (Temporal)   Resp 20   Ht 1.524 m (5')   Wt 105 kg (231 lb)   SpO2 98%   BMI 45.11 kg/m²  Body mass index is 45.11 kg/m².    Physical Exam  Vitals reviewed.   Constitutional:       General: She is not in acute distress.     Appearance: Normal appearance. She is well-groomed. She is obese. She is not ill-appearing.   HENT:      Head: Normocephalic and atraumatic.      Right Ear: Tympanic membrane, ear canal and external ear normal.      Left Ear: Tympanic membrane, ear canal and external ear normal.      Nose: Nose normal.      Mouth/Throat:      Mouth: Mucous membranes are moist.      Pharynx: Oropharynx is clear.   Eyes:      Extraocular Movements: Extraocular movements intact.      Conjunctiva/sclera: Conjunctivae normal.      Pupils: Pupils are equal, round, and reactive to light.   Neck:      Thyroid: No thyromegaly.      Trachea: Trachea normal.   Cardiovascular:      Rate and Rhythm: Normal rate and regular rhythm.      Pulses:           Posterior tibial pulses are 1+ on the right side and 0 on the left side.      Heart sounds: Normal heart sounds. No murmur heard.  Pulmonary:      Effort: Pulmonary effort is normal. No respiratory distress.      Breath sounds: Normal breath sounds. No wheezing.   Abdominal:      General: Abdomen is protuberant. Bowel sounds are normal.   Musculoskeletal:         General: No swelling, tenderness or deformity. Normal range of motion.      Cervical back: Full passive range of motion without pain.      Left lower le+ Pitting Edema present.   Lymphadenopathy:      Cervical: No cervical adenopathy.   Skin:     General: Skin is warm and dry.      Capillary Refill: Capillary refill takes less than 2 seconds.             Comments: hyperpigmentation   Neurological:      General: No focal deficit present.      Mental  Status: She is alert and oriented to person, place, and time. Mental status is at baseline.      Cranial Nerves: No cranial nerve deficit.      Sensory: No sensory deficit.      Motor: No weakness.   Psychiatric:         Mood and Affect: Mood normal.         Behavior: Behavior normal.       Assessment & Plan:     83 y.o. female with the following -     Problem List Items Addressed This Visit       Acquired hypothyroidism     Chronic, stable. Denies symptoms of hyper/hypothyroid. Will repeat tsh annually, continue levothyroxine 50 mcg daily on an empty stomach.         Relevant Orders    TSH    Stage 3a chronic kidney disease (HCC)    Relevant Medications    telmisartan (MICARDIS) 40 MG Tab    Primary hypertension     Chronic, stable. Bp in clinic today 132/70. Continue telmisartan 40 mg daily, atenolol 25 mg daily         Relevant Medications    telmisartan (MICARDIS) 40 MG Tab    Severe obesity (BMI >= 40) ([facility])     Chronic, unstable. Discussed healthy lifestyle recommendations.   Reports she is exercising on stationary bike daily, eating healthy diet.         Relevant Orders    Patient identified as having weight management issue.  Appropriate orders and counseling given.    Chronic bilateral low back pain without sciatica     Chronic, ongoing. Reports low back pain intermittent. Has taken tylenol as needed, doing back exercises which helps some.  Encouraged continue conservative treatment for back pain.         Atherosclerosis of aorta ([facility])    Relevant Medications    telmisartan (MICARDIS) 40 MG Tab    PAD (peripheral artery disease) ([facility])     Chronic, exacerbated. Treatment and follow up: continue healthy lifestyle recommendations, statins, blood pressure control. Pt reports has had blister recurring to LLE, 3/30/22: QUANTA JON SCREENING R .73 /  L  1.11    Will provide referral to vascular  Unable to tolerate baby asa, due to stomach upset.    Slight pitting edema to LLE, hyperpigmentation noted  circumferentially to left ankle area, unable to palpate post tibial pulses.         Relevant Medications    telmisartan (MICARDIS) 40 MG Tab    Other Relevant Orders    Referral to Vascular Medicine    Dyslipidemia     Chronic, stable. At goal with LDL, continue rosuvastatin 10 mg. Continue healthy lifestyle recommendations.          Relevant Orders    Lipid Profile    Prediabetes     Chronic, unstable. Discussed healthy lifestyle recommendations.   Plan to recheck A1c q6 months.         Hypertensive kidney disease with stage 3a chronic kidney disease (HCC)    Hepatic steatosis    Relevant Orders    TSH    Comp Metabolic Panel    Chronic pain of left knee     Chronic, unstable. Reports pain to left knee, worse with weight bearing, walking long distances. Denies trauma or injury to joint, Has taken tylenol and used topical voltaren for this which helps.  Consider steroid injection         Relevant Orders    DX-KNEE 3 VIEWS Atraumatic Pain/Swelling/Deformity    RESOLVED: BMI 45.0-49.9, adult (HCC)     Other Visit Diagnoses       Morbid (severe) obesity due to excess calories (HCC)        Body mass index (BMI) 45.0-49.9, adult ([facility])        Screening for cardiovascular condition        Relevant Orders    TSH    Comp Metabolic Panel    Lipid Profile    Elevated glucose level        Relevant Orders    HEMOGLOBIN A1C    Screening for deficiency anemia        Postmenopausal        Relevant Orders    VITAMIN D,25 HYDROXY (DEFICIENCY)    Vitamin D deficiency        Relevant Orders    VITAMIN D,25 HYDROXY (DEFICIENCY)    Stage 3b chronic kidney disease (HCC)        Relevant Medications    telmisartan (MICARDIS) 40 MG Tab    Essential hypertension        Relevant Medications    telmisartan (MICARDIS) 40 MG Tab          Patient was educated in proper administration of medication(s) ordered today including safety, possible SE, risks, benefits, rationale and alternatives to therapy.   Supportive care, differential diagnoses, and  indications for immediate follow-up discussed with patient.    Pathogenesis of diagnosis discussed including typical length and natural progression.    Instructed to return to clinic or nearest emergency department for any change in condition, further concerns, or worsening of symptoms.  Patient states understanding of the plan of care and discharge instructions.    Return in about 3 months (around 4/16/2024) for Labs.    I spent a total of 42 minutes with record review, exam, communication with the patient, communication with other providers, and documentation of this encounter.    Please note that this dictation was created using voice recognition software. I have made every reasonable attempt to correct obvious errors, but I expect that there are errors of grammar and possibly content that I did not discover before finalizing the note.

## 2024-01-16 NOTE — ASSESSMENT & PLAN NOTE
Chronic, stable. Bp in clinic today 132/70. Continue telmisartan 40 mg daily, atenolol 25 mg daily

## 2024-01-16 NOTE — ASSESSMENT & PLAN NOTE
Chronic, stable. At goal with LDL, continue rosuvastatin 10 mg. Continue healthy lifestyle recommendations.

## 2024-01-17 NOTE — ASSESSMENT & PLAN NOTE
Chronic, ongoing. Reports low back pain intermittent. Has taken tylenol as needed, doing back exercises which helps some.  Encouraged continue conservative treatment for back pain.

## 2024-01-17 NOTE — ASSESSMENT & PLAN NOTE
Chronic, unstable. Discussed healthy lifestyle recommendations.   Reports she is exercising on stationary bike daily, eating healthy diet.

## 2024-01-17 NOTE — RESULT ENCOUNTER NOTE
Reviewed results, which will be discussed at upcoming appointment, consider intraarticular injection for this.

## 2024-01-17 NOTE — ASSESSMENT & PLAN NOTE
Chronic, unstable. Reports pain to left knee, worse with weight bearing, walking long distances. Denies trauma or injury to joint, Has taken tylenol and used topical voltaren for this which helps.  Consider steroid injection

## 2024-01-17 NOTE — ASSESSMENT & PLAN NOTE
Chronic, exacerbated. Treatment and follow up: continue healthy lifestyle recommendations, statins, blood pressure control. Pt reports has had blister recurring to LLE, 3/30/22: QUANTA JON SCREENING R .73 /  L  1.11    Will provide referral to vascular  Unable to tolerate baby asa, due to stomach upset.    Slight pitting edema to LLE, hyperpigmentation noted circumferentially to left ankle area, unable to palpate post tibial pulses.

## 2024-01-25 ENCOUNTER — OFFICE VISIT (OUTPATIENT)
Dept: VASCULAR LAB | Facility: MEDICAL CENTER | Age: 84
End: 2024-01-25
Attending: INTERNAL MEDICINE
Payer: MEDICARE

## 2024-01-25 VITALS
HEIGHT: 60 IN | HEART RATE: 76 BPM | SYSTOLIC BLOOD PRESSURE: 134 MMHG | WEIGHT: 229 LBS | BODY MASS INDEX: 44.96 KG/M2 | DIASTOLIC BLOOD PRESSURE: 81 MMHG

## 2024-01-25 DIAGNOSIS — I12.9 HYPERTENSIVE KIDNEY DISEASE WITH STAGE 3A CHRONIC KIDNEY DISEASE: ICD-10-CM

## 2024-01-25 DIAGNOSIS — K76.0 HEPATIC STEATOSIS: ICD-10-CM

## 2024-01-25 DIAGNOSIS — R74.8 ELEVATED ALKALINE PHOSPHATASE LEVEL: ICD-10-CM

## 2024-01-25 DIAGNOSIS — R73.03 PREDIABETES: ICD-10-CM

## 2024-01-25 DIAGNOSIS — E66.01 SEVERE OBESITY (BMI >= 40) (HCC): ICD-10-CM

## 2024-01-25 DIAGNOSIS — E78.5 DYSLIPIDEMIA: ICD-10-CM

## 2024-01-25 DIAGNOSIS — N18.31 HYPERTENSIVE KIDNEY DISEASE WITH STAGE 3A CHRONIC KIDNEY DISEASE: ICD-10-CM

## 2024-01-25 DIAGNOSIS — E03.9 ACQUIRED HYPOTHYROIDISM: ICD-10-CM

## 2024-01-25 DIAGNOSIS — I73.9 PAD (PERIPHERAL ARTERY DISEASE) (HCC): ICD-10-CM

## 2024-01-25 DIAGNOSIS — S81.802A UNSPECIFIED OPEN WOUND, LEFT LOWER LEG, INITIAL ENCOUNTER: ICD-10-CM

## 2024-01-25 DIAGNOSIS — L81.8 STASIS PIGMENTATION: ICD-10-CM

## 2024-01-25 DIAGNOSIS — I70.0 ATHEROSCLEROSIS OF AORTA (HCC): ICD-10-CM

## 2024-01-25 DIAGNOSIS — M79.89 LEFT LEG SWELLING: ICD-10-CM

## 2024-01-25 PROCEDURE — 99204 OFFICE O/P NEW MOD 45 MIN: CPT | Performed by: FAMILY MEDICINE

## 2024-01-25 PROCEDURE — 3075F SYST BP GE 130 - 139MM HG: CPT | Performed by: FAMILY MEDICINE

## 2024-01-25 PROCEDURE — 3079F DIAST BP 80-89 MM HG: CPT | Performed by: FAMILY MEDICINE

## 2024-01-25 PROCEDURE — 99212 OFFICE O/P EST SF 10 MIN: CPT

## 2024-01-25 ASSESSMENT — ENCOUNTER SYMPTOMS
ORTHOPNEA: 0
SPUTUM PRODUCTION: 0
FEVER: 0
WHEEZING: 0
COUGH: 0
CLAUDICATION: 0
CHILLS: 0
PND: 0
PALPITATIONS: 0
HEMOPTYSIS: 0
SHORTNESS OF BREATH: 0

## 2024-01-25 ASSESSMENT — FIBROSIS 4 INDEX: FIB4 SCORE: 1.92

## 2024-01-25 NOTE — PROGRESS NOTES
VASCULAR MEDICINE CLINIC - INITIAL VISIT  24     Jammie Berry is a 83 y.o. female  who has been referred for vascular medicine evaluation and management for possible PAD  Referring provider: Simona Rogers,*     Subjective      Eval for Lower extremity PAD  Initial visit hx: seen 24 by PCP and noted to have abnormal Quantaflo R 0.73 / L 1.11.  no confirmatory SONYA ordered or completed to date.  Reported hx of chronic PAD per notes and noted to have recurrent vesicular wound at anterior LLE for years.  Reports chills leading to recurrent blisters and LLE calf swelling.  Had similar swelling to LUE.  Has healed but had dark spots.  Chronic limb ischemic sx: denies rest pain, nonhealing leg wounds, cold extremities, coloration changes   Age at symptom onset: 3yr  Pain-free walking distance: continuous  Maximum walking distance, prior to stopping due to symptoms: n/a  Prior imaging studies:  No  Prior back injury: Yes, Details: known hx of back problems   Prior dx of neuropathy or neurogenic claudication: No  Prior treatment: none   Tobacco hx:  reports that she quit smoking about 47 years ago. Her smoking use included cigarettes. She started smoking about 59 years ago. She has a 3.0 pack-year smoking history. She has never used smokeless tobacco.     HTN: Stable, tolerating meds, good adherence  Home BP los/70s    Antithrombotic therapy:  none, no bleeding/bruising reported      Hyperlipidemia:  Stable, Current treatment: lifestyle changes  and Moderate intensity - tolerating, good adherence       Patient Active Problem List    Diagnosis Date Noted    Stasis pigmentation 2024    Chronic pain of left knee 2024    Hepatic steatosis 2023    Hypertensive kidney disease with stage 3a chronic kidney disease (HCC) 2023    Prediabetes 10/18/2022    PAD (peripheral artery disease) ([facility]) 2022    Dyslipidemia 2022    Atherosclerosis of aorta (HCC) 2021     Elevated alkaline phosphatase level 11/16/2020    Chronic bilateral low back pain without sciatica 05/14/2020    Severe obesity (BMI >= 40) (HCC) 05/24/2018    Glaucoma of both eyes 05/24/2018    Stage 3a chronic kidney disease (HCC) 09/04/2015    Primary hypertension 09/04/2015    Acquired hypothyroidism 09/03/2015      Past Surgical History:   Procedure Laterality Date    TONSILLECTOMY        Family History   Problem Relation Age of Onset    Heart Attack Mother     Alcohol abuse Mother         etoh    Lung Disease Father         smoker    Alcohol abuse Father         etoh    Heart Attack Father     Hypertension Sister     Hyperlipidemia Sister     Lung Disease Sister         smoker    Colon Cancer Sister     Kidney Disease Sister     Lung Disease Maternal Aunt     Heart Disease Maternal Aunt     Hypertension Maternal Aunt     Hyperlipidemia Maternal Aunt     Stroke Maternal Grandmother     Other Maternal Grandmother         TB of the brain    Stroke Maternal Grandfather     Colon Cancer Paternal Grandmother     No Known Problems Paternal Grandfather     Ovarian Cancer Neg Hx     Tubal Cancer Neg Hx     Peritoneal Cancer Neg Hx     Colorectal Cancer Neg Hx     Breast Cancer Neg Hx     Diabetes Neg Hx       Current Outpatient Medications on File Prior to Visit   Medication Sig Dispense Refill    atenolol (TENORMIN) 25 MG Tab TAKE 1 TABLET BY MOUTH EVERY  Tablet 3    telmisartan (MICARDIS) 40 MG Tab Take 1 Tablet by mouth every day. 100 Tablet 3    levothyroxine (SYNTHROID) 50 MCG Tab Take 1 Tablet by mouth every day. 90 Tablet 3    rosuvastatin (CRESTOR) 10 MG Tab Take 1 Tablet by mouth every evening. 100 Tablet 3    zinc sulfate (ZINCATE) 220 (50 Zn) MG Cap Take 220 mg by mouth every day.      Ascorbic Acid (VITAMIN C) 1000 MG Tab Take  by mouth.      acetaminophen (TYLENOL) 325 MG Tab Take 2 Tabs by mouth every 6 hours as needed (Mild Pain; (Pain scale 1-3); Temp greater than 100.5 F). 30 Tab 0     cyanocobalamin (VITAMIN B-12) 500 MCG Tab Take 500 mcg by mouth every day.      Cholecalciferol (VITAMIN D-3) 25 MCG (1000 UT) Cap Take 1,000 Units by mouth every day.      therapeutic multivitamin-minerals (THERAGRAN-M) TABS Take 1 Tab by mouth every morning.       No current facility-administered medications on file prior to visit.      ALLERGIES  Patient has no known allergies.    Social History     Tobacco Use    Smoking status: Former     Current packs/day: 0.00     Average packs/day: 0.3 packs/day for 12.0 years (3.0 ttl pk-yrs)     Types: Cigarettes     Start date: 1965     Quit date: 1977     Years since quittin.0    Smokeless tobacco: Never    Tobacco comments:     Started smoking at age 25 (social smoker)   Vaping Use    Vaping Use: Never used   Substance Use Topics    Alcohol use: No     Alcohol/week: 0.0 oz    Drug use: No     DIET AND EXERCISE:  Weight Change:n/a  Diet: common adult  Exercise: minimal exercise     Review of Systems   Constitutional:  Negative for chills, fever and malaise/fatigue.   Respiratory:  Negative for cough, hemoptysis, sputum production, shortness of breath and wheezing.    Cardiovascular:  Negative for chest pain, palpitations, orthopnea, claudication, leg swelling and PND.          Objective    Vitals:    24 0807 24 0810   BP: (!) 140/85 134/81   BP Location: Left arm Left arm   Patient Position: Sitting Sitting   BP Cuff Size: Thigh Thigh   Pulse: 77 76   Weight: 104 kg (229 lb)    Height: 1.524 m (5')       BP Readings from Last 4 Encounters:   24 134/81   24 132/70   11/15/23 124/74   23 118/74      Body mass index is 44.72 kg/m².   Wt Readings from Last 4 Encounters:   24 104 kg (229 lb)   24 105 kg (231 lb)   11/15/23 106 kg (234 lb)   23 109 kg (240 lb)      Physical Exam  Constitutional:       General: She is not in acute distress.     Appearance: Normal appearance. She is not diaphoretic.   HENT:      Head:  Normocephalic and atraumatic.   Eyes:      Conjunctiva/sclera: Conjunctivae normal.   Cardiovascular:      Rate and Rhythm: Normal rate and regular rhythm.      Pulses:           Carotid pulses are 2+ on the right side and 2+ on the left side.       Radial pulses are 2+ on the right side and 2+ on the left side.        Dorsalis pedis pulses are 2+ on the right side and 2+ on the left side.        Posterior tibial pulses are 1+ on the right side and 1+ on the left side.      Heart sounds: Normal heart sounds.      Comments: Warm, no varicosities   Pulmonary:      Effort: Pulmonary effort is normal.      Breath sounds: Normal breath sounds.   Musculoskeletal:      Right lower leg: No edema.      Left lower leg: No edema.   Skin:     General: Skin is warm and dry.          Neurological:      Mental Status: She is alert and oriented to person, place, and time.      Cranial Nerves: No cranial nerve deficit.      Gait: Gait is intact.   Psychiatric:         Mood and Affect: Mood and affect normal.          DATA REVIEW    Lab Results   Component Value Date/Time    CHOLSTRLTOT 108 10/11/2022 08:18 AM    LDL 43 10/11/2022 08:18 AM    HDL 43 10/11/2022 08:18 AM    TRIGLYCERIDE 108 10/11/2022 08:18 AM       Lab Results   Component Value Date/Time    SODIUM 139 10/20/2023 08:46 AM    POTASSIUM 4.1 10/20/2023 08:46 AM    CHLORIDE 105 10/20/2023 08:46 AM    CO2 24 10/20/2023 08:46 AM    GLUCOSE 105 (H) 10/20/2023 08:46 AM    BUN 14 10/20/2023 08:46 AM    CREATININE 0.96 10/20/2023 08:46 AM     Lab Results   Component Value Date/Time    ALKPHOSPHAT 136 (H) 04/11/2023 08:29 AM    ASTSGOT 23 04/11/2023 08:29 AM    ALTSGPT 15 04/11/2023 08:29 AM    TBILIRUBIN 0.5 04/11/2023 08:29 AM       Lab Results   Component Value Date/Time    HBA1C 6.2 (H) 04/11/2023 08:29 AM       Lab Results   Component Value Date/Time    MALBCRT 23 10/20/2023 08:46 AM    MICROALBUR 5.9 10/20/2023 08:46 AM         Cardiovascular Imaging:    CTPA 2020  No  "PE  Atherosclerotic plaque is seen in the aorta.     US RUQ 2022  1.  Hepatic steatosis and borderline hepatomegaly.  2.  3.1 cm hepatic cyst.          Medical Decision Making:  Today's Assessment / Status / Plan:     1. PAD (peripheral artery disease) (HCC)  US-EXTREMITY ARTERY LOWER BILAT W/SONYA (COMBO)    Sed Rate    CRP QUANTITIVE (NON-CARDIAC)    CRYOGLOBULIN QL SERUM RFLX      2. Atherosclerosis of aorta (HCC)  US-EXTREMITY ARTERY LOWER BILAT W/SONYA (COMBO)      3. Hypertensive kidney disease with stage 3a chronic kidney disease (HCC)        4. Body mass index 40.0-44.9, adult (HCC)        5. Severe obesity (BMI >= 40) (HCC)        6. Unspecified open wound, left lower leg, initial encounter  US-EXTREMITY ARTERY LOWER BILAT W/SONYA (COMBO)    US-EXTREMITY VENOUS LOWER UNILAT LEFT    Sed Rate    CRP QUANTITIVE (NON-CARDIAC)    CRYOGLOBULIN QL SERUM RFLX      7. Stasis pigmentation  US-EXTREMITY VENOUS LOWER UNILAT LEFT      8. Left leg swelling  US-EXTREMITY VENOUS LOWER UNILAT LEFT      9. Prediabetes  HEMOGLOBIN A1C      10. Hepatic steatosis        11. Dyslipidemia  Comp Metabolic Panel      12. Acquired hypothyroidism        13. Elevated alkaline phosphatase level  Comp Metabolic Panel    GAMMA GT (GGT)         Etiology of Established CVD if Present:     1) possible Lower extremity PAD - asymptomatic  - No indications of true chronic limb threatening ischemia (CLTI) changes or hx of lifestyle-limiting symptoms, pulses normal with minimal reduction in bilat PT pulses   Abnormal quantaflo with reduced R only 0.71 with normal on LLE however the symptom concerns are for the leg leg  - limited, observation evidence is available on accuracy of Quantaflo for dx of PAD and outcomes data, so no clinical dx can be made on this exam and true SONYA needs to be assessed to aid in actual dx.    - per article \"The Accuracy of a Volume Plethysmography System as Assessed by Contrast Angiography\". J Prev Med Vol.3 No.2: 15.:  " Using contrast angiography as the gold standard for determining peripheral artery disease, the volume plethysmography system (ie. Quantaflo) had sensitivity of 86.0%, specificity of 100% and accuracy of 87.5%.  indicating roughly 14% false positive rate   Meir class:  I  Change in Pain-free walking distance: n/a  Change in Maximum walking distance: n/a  Plan:  - continue current lifestyle mgmt and meds, no indications for addition med changes   - check SONYA/art duplex  - due to higher concern for CVI, check LLE VR duplex to assess venous function  - check labs include inflam markers and cryoglob to inciting of lesions after cold exposure     2) CHRONIC VENOUS DISEASE / INSUFFICIENCY   LLE CEAP classification: C3S / Ep / A? / P?  RLE CEAP classification: C0   - mainly notable L med malleolar stasis pig with derm that is localized  Plan:  - check venous reflux duplex then consider for tx options based upon results     3) Non-aneurysmal aortic atherosclerosis (AS) - per CT, no symptoms or prior known associated clinical manifestations  - general indicator of systemic AS with higher potential AS in other vascular beds  - Higher overall ASCVD risk, no complex features   Plan:  - no further imaging surveillance, continue med mgmt      LIPID MANAGEMENT  Qualifies for Statin Therapy Based on 2018 ACC/AHA Guidelines: yes, Secondary prevention - >76yo, ASCVD not at very high risk   10-yr ASCVD risk score: The ASCVD Risk score (Weesatche DK, et al., 2019) failed to calculate., N/A  Major ASCVD events: Symptomatic PAD (hx of claudication with SONYA <0.85, previous revascularization/amputation)   - possibly not symptomatic   High-risk conditions: >64yr old , Hypertension , and CKD (egfr 15-59)  Risk-enhancers: N/A  Currently on Statin: Yes  Tx goals: LDL-C <70 preferred   At goal? Yes - no recent labs   Plan:   - continue rosuva 10mg daily  - update labs      BLOOD PRESSURE MANAGEMENT  Office BP Goal ACC/AHA (2017) goal  <130/80  Home BP at goal:  yes  Office BP at goal:  no  24h ABPM:  not ordered to date   RDN candidate? NO  Contributing factors: obesity   Plan:   Monitoring:   - start/continue home BP monitoring, reviewed correct technique, provide BP log and instructions  Medications:  - continue telmisartan 40mg daily  - consider amlodipine as next agent     GLYCEMIC MANAGEMENT Prediabetic  Lab Results   Component Value Date    HBA1C 6.2 (H) 04/11/2023    Plan:  - continue healthy diet, weight reduction, daily physical activity   - consider metformin initiation up to 850mg BID if A1c 6.2+ in future to reduce T2D progression  - monitor labs Q6-12mo     ANTITHROMBOTIC THERAPY   No indications at this time, may revisit if true PAD     LIFESTYLE INTERVENTIONS:    Tobacco Use: Stages of Change: Maintenance (sustained change >6mo)     reports that she quit smoking about 47 years ago. Her smoking use included cigarettes. She started smoking about 59 years ago. She has a 3.0 pack-year smoking history. She has never used smokeless tobacco.   - continued complete avoidance of all tobacco products     Physical Activity: recommend increase 10-15min walking/day to improve venous return, try to increase to >150min/week if feasible     Weight Management and/or Nutrition Mediterranean style dietary approach     - overall wt reduction will improve preDM, venous return - focus on 5-7% BW loss     OTHER:     # elevated ALP with hepatic steatosis - update labs, continue wt loss     Studies to Be Obtained: SONYA, LLE VR duplex   Labs to Be Obtained: if ordered, as noted in assessment      Follow up in: 6 weeks    Gabriel Mcconnell M.D.   [facility]  [facility]  [phone]    Cc:

## 2024-04-04 ENCOUNTER — TELEPHONE (OUTPATIENT)
Dept: HEALTH INFORMATION MANAGEMENT | Facility: OTHER | Age: 84
End: 2024-04-04
Payer: MEDICARE

## 2024-04-09 ENCOUNTER — HOSPITAL ENCOUNTER (OUTPATIENT)
Dept: LAB | Facility: MEDICAL CENTER | Age: 84
End: 2024-04-09
Attending: FAMILY MEDICINE
Payer: MEDICARE

## 2024-04-09 ENCOUNTER — HOSPITAL ENCOUNTER (OUTPATIENT)
Dept: LAB | Facility: MEDICAL CENTER | Age: 84
End: 2024-04-09
Payer: MEDICARE

## 2024-04-09 DIAGNOSIS — Z78.0 POSTMENOPAUSAL: ICD-10-CM

## 2024-04-09 DIAGNOSIS — Z13.6 SCREENING FOR CARDIOVASCULAR CONDITION: ICD-10-CM

## 2024-04-09 DIAGNOSIS — E78.5 DYSLIPIDEMIA: ICD-10-CM

## 2024-04-09 DIAGNOSIS — I73.9 PAD (PERIPHERAL ARTERY DISEASE) (HCC): ICD-10-CM

## 2024-04-09 DIAGNOSIS — S81.802A UNSPECIFIED OPEN WOUND, LEFT LOWER LEG, INITIAL ENCOUNTER: ICD-10-CM

## 2024-04-09 DIAGNOSIS — E03.9 ACQUIRED HYPOTHYROIDISM: ICD-10-CM

## 2024-04-09 DIAGNOSIS — E55.9 VITAMIN D DEFICIENCY: ICD-10-CM

## 2024-04-09 DIAGNOSIS — R74.8 ELEVATED ALKALINE PHOSPHATASE LEVEL: ICD-10-CM

## 2024-04-09 DIAGNOSIS — R73.03 PREDIABETES: ICD-10-CM

## 2024-04-09 DIAGNOSIS — K76.0 HEPATIC STEATOSIS: ICD-10-CM

## 2024-04-09 LAB
25(OH)D3 SERPL-MCNC: 67 NG/ML (ref 30–100)
ALBUMIN SERPL BCP-MCNC: 3.7 G/DL (ref 3.2–4.9)
ALBUMIN/GLOB SERPL: 1.1 G/DL
ALP SERPL-CCNC: 120 U/L (ref 30–99)
ALT SERPL-CCNC: 11 U/L (ref 2–50)
ANION GAP SERPL CALC-SCNC: 14 MMOL/L (ref 7–16)
AST SERPL-CCNC: 23 U/L (ref 12–45)
BILIRUB SERPL-MCNC: 0.7 MG/DL (ref 0.1–1.5)
BUN SERPL-MCNC: 17 MG/DL (ref 8–22)
CALCIUM ALBUM COR SERPL-MCNC: 9.4 MG/DL (ref 8.5–10.5)
CALCIUM SERPL-MCNC: 9.2 MG/DL (ref 8.5–10.5)
CHLORIDE SERPL-SCNC: 102 MMOL/L (ref 96–112)
CHOLEST SERPL-MCNC: 111 MG/DL (ref 100–199)
CO2 SERPL-SCNC: 22 MMOL/L (ref 20–33)
CREAT SERPL-MCNC: 1.03 MG/DL (ref 0.5–1.4)
CRP SERPL HS-MCNC: <0.3 MG/DL (ref 0–0.75)
ERYTHROCYTE [SEDIMENTATION RATE] IN BLOOD BY WESTERGREN METHOD: 35 MM/HOUR (ref 0–25)
EST. AVERAGE GLUCOSE BLD GHB EST-MCNC: 123 MG/DL
GFR SERPLBLD CREATININE-BSD FMLA CKD-EPI: 54 ML/MIN/1.73 M 2
GGT SERPL-CCNC: 10 U/L (ref 7–34)
GLOBULIN SER CALC-MCNC: 3.4 G/DL (ref 1.9–3.5)
GLUCOSE SERPL-MCNC: 103 MG/DL (ref 65–99)
HBA1C MFR BLD: 5.9 % (ref 4–5.6)
HDLC SERPL-MCNC: 51 MG/DL
LDLC SERPL CALC-MCNC: 45 MG/DL
POTASSIUM SERPL-SCNC: 4.2 MMOL/L (ref 3.6–5.5)
PROT SERPL-MCNC: 7.1 G/DL (ref 6–8.2)
SODIUM SERPL-SCNC: 138 MMOL/L (ref 135–145)
TRIGL SERPL-MCNC: 74 MG/DL (ref 0–149)
TSH SERPL DL<=0.005 MIU/L-ACNC: 3.3 UIU/ML (ref 0.38–5.33)

## 2024-04-09 PROCEDURE — 82977 ASSAY OF GGT: CPT

## 2024-04-09 PROCEDURE — 84443 ASSAY THYROID STIM HORMONE: CPT

## 2024-04-09 PROCEDURE — 80053 COMPREHEN METABOLIC PANEL: CPT

## 2024-04-09 PROCEDURE — 86140 C-REACTIVE PROTEIN: CPT

## 2024-04-09 PROCEDURE — 85652 RBC SED RATE AUTOMATED: CPT

## 2024-04-09 PROCEDURE — 36415 COLL VENOUS BLD VENIPUNCTURE: CPT

## 2024-04-09 PROCEDURE — 82595 ASSAY OF CRYOGLOBULIN: CPT

## 2024-04-09 PROCEDURE — 80061 LIPID PANEL: CPT

## 2024-04-09 PROCEDURE — 83036 HEMOGLOBIN GLYCOSYLATED A1C: CPT

## 2024-04-09 PROCEDURE — 82306 VITAMIN D 25 HYDROXY: CPT

## 2024-04-15 LAB — CRYOGLOB SER QL 3D COLD INC: NORMAL

## 2024-04-16 ENCOUNTER — OFFICE VISIT (OUTPATIENT)
Dept: MEDICAL GROUP | Facility: PHYSICIAN GROUP | Age: 84
End: 2024-04-16
Payer: MEDICARE

## 2024-04-16 VITALS
DIASTOLIC BLOOD PRESSURE: 60 MMHG | TEMPERATURE: 98.3 F | RESPIRATION RATE: 16 BRPM | SYSTOLIC BLOOD PRESSURE: 130 MMHG | HEIGHT: 60 IN | OXYGEN SATURATION: 95 % | HEART RATE: 75 BPM | WEIGHT: 230.6 LBS | BODY MASS INDEX: 45.27 KG/M2

## 2024-04-16 DIAGNOSIS — R73.03 PREDIABETES: ICD-10-CM

## 2024-04-16 DIAGNOSIS — K76.0 HEPATIC STEATOSIS: ICD-10-CM

## 2024-04-16 DIAGNOSIS — E03.9 ACQUIRED HYPOTHYROIDISM: ICD-10-CM

## 2024-04-16 DIAGNOSIS — M25.562 CHRONIC PAIN OF LEFT KNEE: ICD-10-CM

## 2024-04-16 DIAGNOSIS — N18.31 STAGE 3A CHRONIC KIDNEY DISEASE: ICD-10-CM

## 2024-04-16 DIAGNOSIS — M17.12 PRIMARY OSTEOARTHRITIS OF LEFT KNEE: ICD-10-CM

## 2024-04-16 DIAGNOSIS — G89.29 CHRONIC PAIN OF LEFT KNEE: ICD-10-CM

## 2024-04-16 PROCEDURE — 3078F DIAST BP <80 MM HG: CPT

## 2024-04-16 PROCEDURE — 99214 OFFICE O/P EST MOD 30 MIN: CPT | Mod: 25

## 2024-04-16 PROCEDURE — 3075F SYST BP GE 130 - 139MM HG: CPT

## 2024-04-16 PROCEDURE — 20610 DRAIN/INJ JOINT/BURSA W/O US: CPT | Mod: LT

## 2024-04-16 RX ORDER — TRIAMCINOLONE ACETONIDE 40 MG/ML
40 INJECTION, SUSPENSION INTRA-ARTICULAR; INTRAMUSCULAR ONCE
Status: COMPLETED | OUTPATIENT
Start: 2024-04-16 | End: 2024-04-16

## 2024-04-16 RX ADMIN — TRIAMCINOLONE ACETONIDE 40 MG: 40 INJECTION, SUSPENSION INTRA-ARTICULAR; INTRAMUSCULAR at 14:40

## 2024-04-16 ASSESSMENT — FIBROSIS 4 INDEX: FIB4 SCORE: 2.25

## 2024-04-16 NOTE — PROGRESS NOTES
Subjective:     CC: Diagnoses of Chronic pain of left knee, Acquired hypothyroidism, Prediabetes, Hepatic steatosis, Stage 3a chronic kidney disease, and Primary osteoarthritis of left knee were pertinent to this visit.    Pt presents today to review labs, wanting injection to left knee for ongoing pain.  Has established with cardiovascular for evaluation of PAD, with upcoming additional testing and follow up scheduled.  HPI:   Jammie presents today with    Problem   Chronic Pain of Left Knee   Hepatic Steatosis   Prediabetes   Stage 3a Chronic Kidney Disease    Followed by Dr. Mae, avoids nephrotoxins     Acquired Hypothyroidism    Diagnosed 2014. Managed with levothyroxine 50 mcg since.  Last TSH 1.24 in August 2016. 3.17 TSH level 2.01       ROS:  Review of Systems   Musculoskeletal:  Positive for joint pain (left knee).   All other systems reviewed and are negative.      Objective:     Exam:  /60 (BP Location: Left leg, Patient Position: Sitting, BP Cuff Size: Adult long)   Pulse 75   Temp 36.8 °C (98.3 °F) (Temporal)   Resp 16   Ht 1.524 m (5')   Wt 105 kg (230 lb 9.6 oz)   SpO2 95%   BMI 45.04 kg/m²  Body mass index is 45.04 kg/m².    Physical Exam  Vitals reviewed.   Constitutional:       General: She is not in acute distress.     Appearance: Normal appearance. She is not ill-appearing.   HENT:      Head: Normocephalic and atraumatic.   Cardiovascular:      Rate and Rhythm: Normal rate.      Pulses: Normal pulses.   Pulmonary:      Effort: Pulmonary effort is normal. No respiratory distress.   Skin:     General: Skin is warm and dry.      Findings: No rash.   Neurological:      General: No focal deficit present.      Mental Status: She is alert and oriented to person, place, and time.   Psychiatric:         Mood and Affect: Mood normal.         Behavior: Behavior normal.         Labs:    Latest Reference Range & Units 04/09/24 07:21 04/09/24 07:23   Sed Rate Westergren 0 - 25 mm/hour 35 (H)     Sodium 135 - 145 mmol/L 138    Potassium 3.6 - 5.5 mmol/L 4.2    Chloride 96 - 112 mmol/L 102    Co2 20 - 33 mmol/L 22    Anion Gap 7.0 - 16.0  14.0    Glucose 65 - 99 mg/dL 103 (H)    Bun 8 - 22 mg/dL 17    Creatinine 0.50 - 1.40 mg/dL 1.03    GFR (CKD-EPI) >60 mL/min/1.73 m 2 54 !    Calcium 8.5 - 10.5 mg/dL 9.2    Correct Calcium 8.5 - 10.5 mg/dL 9.4    AST(SGOT) 12 - 45 U/L 23    ALT(SGPT) 2 - 50 U/L 11    Alkaline Phosphatase 30 - 99 U/L 120 (H)    Total Bilirubin 0.1 - 1.5 mg/dL 0.7    Albumin 3.2 - 4.9 g/dL 3.7    Total Protein 6.0 - 8.2 g/dL 7.1    Globulin 1.9 - 3.5 g/dL 3.4    A-G Ratio g/dL 1.1    Gamma Gt 7 - 34 U/L 10    Glycohemoglobin 4.0 - 5.6 % 5.9 (H)    Estim. Avg Glu mg/dL 123    Cholesterol,Tot 100 - 199 mg/dL  111   Triglycerides 0 - 149 mg/dL  74   HDL >=40 mg/dL  51   LDL <100 mg/dL  45   25-Hydroxy   Vitamin D 25 30 - 100 ng/mL  67   TSH 0.380 - 5.330 uIU/mL  3.300   Stat C-Reactive Protein 0.00 - 0.75 mg/dL <0.30    Cryoglobulin QL NEG 72Hour  NEG 72Hour    (H): Data is abnormally high  !: Data is abnormal    Assessment & Plan:     83 y.o. female with the following -     Problem List Items Addressed This Visit          Family Medicine Problems    Chronic pain of left knee     Chronic, ongoing. Dx 1/16/2024 Severe LEFT knee osteoarthritis primarily involving medial and patellofemoral compartments. She requests articular injection of steroid for temporary relief of symptoms.  She wears knee brace, using cane for support, topical lidocaine with some relief. Unable to use nsaids for ckd, tylenol helps.   Consider orthopedic referral, she declines today.           Relevant Medications    triamcinolone acetonide (Kenalog-40) injection 40 mg    Other Relevant Orders    Consent for all Surgical, Special Diagnostic or Therapeutic Procedures    Joint Inj - LG: knee, L knee       Other    Acquired hypothyroidism     Chronic, stable. Continue levothyroxine 50 mcg daily, recheck lab annually.          Stage 3a chronic kidney disease     Stable  No uremic symptoms  Renal dose of medication  Avoid nephrotoxins  Continue same medication regimen  Recheck labs in 3-6 months           Prediabetes     Chronic, stable. Discussed healthy lifestyle recommendations, plan to recheck A1c q6 months         Hepatic steatosis     Chronic, unstable. Discussed healthy lifestyle recommendations.   Elevated alk phos. In labs          Other Visit Diagnoses       Primary osteoarthritis of left knee        Relevant Medications    triamcinolone acetonide (Kenalog-40) injection 40 mg    Other Relevant Orders    Consent for all Surgical, Special Diagnostic or Therapeutic Procedures    Joint Inj - LG: knee, L knee          Patient was educated in proper administration of medication(s) ordered today including safety, possible SE, risks, benefits, rationale and alternatives to therapy.   Supportive care, differential diagnoses, and indications for immediate follow-up discussed with patient.    Pathogenesis of diagnosis discussed including typical length and natural progression.    Instructed to return to clinic or nearest emergency department for any change in condition, further concerns, or worsening of symptoms.  Patient states understanding of the plan of care and discharge instructions.    Return in about 3 months (around 7/16/2024) for knee pain, Annual Wellness.    Please note that this dictation was created using voice recognition software. I have made every reasonable attempt to correct obvious errors, but I expect that there are errors of grammar and possibly content that I did not discover before finalizing the note.

## 2024-04-16 NOTE — ASSESSMENT & PLAN NOTE
Chronic, stable. Discussed healthy lifestyle recommendations, plan to recheck A1c q6 months   Provider and RADHA notified of critical lab potassium 6.5

## 2024-04-16 NOTE — ASSESSMENT & PLAN NOTE
Stable  No uremic symptoms  Renal dose of medication  Avoid nephrotoxins  Continue same medication regimen  Recheck labs in 3-6 months

## 2024-04-16 NOTE — PROCEDURES
Joint Inj - LG: knee, L knee on 4/16/2024 2:50 PM  Details: 22 G needle, anteromedial approach  Aspirate: clear  Outcome: tolerated well, no immediate complications    A steroid injection was performed at left knee using 1% plain Lidocaine and 40 mg of Kenalog. This was well tolerated.   Procedure, treatment alternatives, risks and benefits explained, specific risks discussed. Consent was given by the patient. Immediately prior to procedure a time out was called to verify the correct patient, procedure, equipment, support staff and site/side marked as required. Patient was prepped and draped in the usual sterile fashion.

## 2024-04-16 NOTE — ASSESSMENT & PLAN NOTE
Chronic, ongoing. Dx 1/16/2024 Severe LEFT knee osteoarthritis primarily involving medial and patellofemoral compartments. She requests articular injection of steroid for temporary relief of symptoms.  She wears knee brace, using cane for support, topical lidocaine with some relief. Unable to use nsaids for ckd, tylenol helps.   Consider orthopedic referral, she declines today.

## 2024-04-19 ENCOUNTER — HOSPITAL ENCOUNTER (OUTPATIENT)
Dept: RADIOLOGY | Facility: MEDICAL CENTER | Age: 84
End: 2024-04-19
Attending: FAMILY MEDICINE
Payer: MEDICARE

## 2024-04-19 DIAGNOSIS — M79.89 LEFT LEG SWELLING: ICD-10-CM

## 2024-04-19 DIAGNOSIS — I73.9 PAD (PERIPHERAL ARTERY DISEASE) (HCC): ICD-10-CM

## 2024-04-19 DIAGNOSIS — S81.802A UNSPECIFIED OPEN WOUND, LEFT LOWER LEG, INITIAL ENCOUNTER: ICD-10-CM

## 2024-04-19 DIAGNOSIS — L81.8 STASIS PIGMENTATION: ICD-10-CM

## 2024-04-19 DIAGNOSIS — I70.0 ATHEROSCLEROSIS OF AORTA (HCC): ICD-10-CM

## 2024-04-19 PROCEDURE — 93971 EXTREMITY STUDY: CPT | Mod: LT

## 2024-04-19 PROCEDURE — 93922 UPR/L XTREMITY ART 2 LEVELS: CPT

## 2024-04-23 ENCOUNTER — OFFICE VISIT (OUTPATIENT)
Dept: VASCULAR LAB | Facility: MEDICAL CENTER | Age: 84
End: 2024-04-23
Attending: FAMILY MEDICINE
Payer: MEDICARE

## 2024-04-23 VITALS
SYSTOLIC BLOOD PRESSURE: 101 MMHG | DIASTOLIC BLOOD PRESSURE: 58 MMHG | WEIGHT: 227 LBS | HEIGHT: 60 IN | HEART RATE: 70 BPM | BODY MASS INDEX: 44.57 KG/M2

## 2024-04-23 DIAGNOSIS — E66.01 SEVERE OBESITY (BMI >= 40) (HCC): ICD-10-CM

## 2024-04-23 DIAGNOSIS — N18.31 HYPERTENSIVE KIDNEY DISEASE WITH STAGE 3A CHRONIC KIDNEY DISEASE: ICD-10-CM

## 2024-04-23 DIAGNOSIS — E78.5 DYSLIPIDEMIA: ICD-10-CM

## 2024-04-23 DIAGNOSIS — K76.0 HEPATIC STEATOSIS: ICD-10-CM

## 2024-04-23 DIAGNOSIS — E03.9 ACQUIRED HYPOTHYROIDISM: ICD-10-CM

## 2024-04-23 DIAGNOSIS — R73.03 PREDIABETES: ICD-10-CM

## 2024-04-23 DIAGNOSIS — M79.89 LEFT LEG SWELLING: ICD-10-CM

## 2024-04-23 DIAGNOSIS — L81.8 STASIS PIGMENTATION: ICD-10-CM

## 2024-04-23 DIAGNOSIS — I12.9 HYPERTENSIVE KIDNEY DISEASE WITH STAGE 3A CHRONIC KIDNEY DISEASE: ICD-10-CM

## 2024-04-23 DIAGNOSIS — S81.802D UNSPECIFIED OPEN WOUND, LEFT LOWER LEG, SUBSEQUENT ENCOUNTER: ICD-10-CM

## 2024-04-23 DIAGNOSIS — I70.0 ATHEROSCLEROSIS OF AORTA (HCC): ICD-10-CM

## 2024-04-23 PROCEDURE — 3078F DIAST BP <80 MM HG: CPT | Performed by: FAMILY MEDICINE

## 2024-04-23 PROCEDURE — 3074F SYST BP LT 130 MM HG: CPT | Performed by: FAMILY MEDICINE

## 2024-04-23 PROCEDURE — 99213 OFFICE O/P EST LOW 20 MIN: CPT | Performed by: FAMILY MEDICINE

## 2024-04-23 PROCEDURE — 99212 OFFICE O/P EST SF 10 MIN: CPT

## 2024-04-23 ASSESSMENT — ENCOUNTER SYMPTOMS
HEMOPTYSIS: 0
CHILLS: 0
WHEEZING: 0
COUGH: 0
PND: 0
FEVER: 0
CLAUDICATION: 0
SHORTNESS OF BREATH: 0
SPUTUM PRODUCTION: 0
PALPITATIONS: 0
ORTHOPNEA: 0

## 2024-04-23 ASSESSMENT — FIBROSIS 4 INDEX: FIB4 SCORE: 2.25

## 2024-04-23 NOTE — PROGRESS NOTES
VASCULAR MEDICINE CLINIC - follow-up  24     Jammie Berry is a emale  who has been referred for vascular medicine evaluation and management for possible PAD  Referring provider: Simona Rogers 2024     Subjective      Interval hx/concerns: last seen 2024, feeling well.  Has improved diet and increase exercise to 40+min/day.  Feeling good energy.  Less swelling.  Had SONYA, VR duplex and labs.  Reports leg lesion has basically resolved.      Eval for Lower extremity PAD  Initial visit hx: seen 24 by PCP and noted to have abnormal Quantaflo R 0.73 / L 1.11.  no confirmatory SONYA ordered or completed to date.  Reported hx of chronic PAD per notes and noted to have recurrent vesicular wound at anterior LLE for years.  Reports chills leading to recurrent blisters and LLE calf swelling.  Had similar swelling to LUE.  Has healed but had dark spots.  Chronic limb ischemic sx: denies rest pain, nonhealing leg wounds, cold extremities, coloration changes   Age at symptom onset: 3yr  Pain-free walking distance: continuous  Maximum walking distance, prior to stopping due to symptoms: n/a  Prior imaging studies:  No  Prior back injury: Yes, Details: known hx of back problems   Prior dx of neuropathy or neurogenic claudication: No  Prior treatment: none   Tobacco hx:  reports that she quit smoking about 47 years ago. Her smoking use included cigarettes. She started smoking about 59 years ago. She has a 3 pack-year smoking history. She has never used smokeless tobacco.     HTN: Stable, tolerating meds, good adherence, Home BP los/70s    Antithrombotic therapy:  none, no bleeding/bruising reported      HLD: Stable, on lifestyle changes  and Moderate intensity - tolerating, good adherence      Current Outpatient Medications on File Prior to Visit   Medication Sig Dispense Refill    atenolol (TENORMIN) 25 MG Tab TAKE 1 TABLET BY MOUTH EVERY  Tablet 3    telmisartan (MICARDIS) 40 MG Tab  Take 1 Tablet by mouth every day. 100 Tablet 3    levothyroxine (SYNTHROID) 50 MCG Tab Take 1 Tablet by mouth every day. 90 Tablet 3    rosuvastatin (CRESTOR) 10 MG Tab Take 1 Tablet by mouth every evening. 100 Tablet 3    zinc sulfate (ZINCATE) 220 (50 Zn) MG Cap Take 220 mg by mouth every day.      Ascorbic Acid (VITAMIN C) 1000 MG Tab Take  by mouth.      acetaminophen (TYLENOL) 325 MG Tab Take 2 Tabs by mouth every 6 hours as needed (Mild Pain; (Pain scale 1-3); Temp greater than 100.5 F). 30 Tab 0    cyanocobalamin (VITAMIN B-12) 500 MCG Tab Take 500 mcg by mouth every day.      Cholecalciferol (VITAMIN D-3) 25 MCG (1000 UT) Cap Take 1,000 Units by mouth every day.      therapeutic multivitamin-minerals (THERAGRAN-M) TABS Take 1 Tab by mouth every morning.       No current facility-administered medications on file prior to visit.      ALLERGIES  Patient has no known allergies.    Social History     Tobacco Use    Smoking status: Former     Current packs/day: 0.00     Average packs/day: 0.3 packs/day for 12.0 years (3.0 ttl pk-yrs)     Types: Cigarettes     Start date: 1965     Quit date: 1977     Years since quittin.3    Smokeless tobacco: Never    Tobacco comments:     Started smoking at age 25 (social smoker)   Vaping Use    Vaping Use: Never used   Substance Use Topics    Alcohol use: No     Alcohol/week: 0.0 oz    Drug use: No     DIET AND EXERCISE:  Weight Change:n/a  Diet: common adult  Exercise: minimal exercise     Review of Systems   Constitutional:  Negative for chills, fever and malaise/fatigue.   Respiratory:  Negative for cough, hemoptysis, sputum production, shortness of breath and wheezing.    Cardiovascular:  Negative for chest pain, palpitations, orthopnea, claudication, leg swelling and PND.        Objective    Vitals:    24 1502   BP: 101/58   BP Location: Left arm   Patient Position: Sitting   BP Cuff Size: Large adult   Pulse: 70   Weight: 103 kg (227 lb)   Height: 1.524  m (5')     BP Readings from Last 4 Encounters:   04/23/24 101/58   04/16/24 130/60   01/25/24 134/81   01/16/24 132/70      Body mass index is 44.33 kg/m².   Wt Readings from Last 4 Encounters:   04/23/24 103 kg (227 lb)   04/16/24 105 kg (230 lb 9.6 oz)   01/25/24 104 kg (229 lb)   01/16/24 105 kg (231 lb)      Physical Exam  Constitutional:       General: She is not in acute distress.     Appearance: Normal appearance. She is not diaphoretic.   HENT:      Head: Normocephalic and atraumatic.   Eyes:      Conjunctiva/sclera: Conjunctivae normal.   Cardiovascular:      Rate and Rhythm: Normal rate and regular rhythm.      Pulses:           Carotid pulses are 2+ on the right side and 2+ on the left side.       Radial pulses are 2+ on the right side and 2+ on the left side.        Dorsalis pedis pulses are 2+ on the right side and 2+ on the left side.        Posterior tibial pulses are 1+ on the right side and 1+ on the left side.      Heart sounds: Normal heart sounds.      Comments: Warm, no varicosities   Pulmonary:      Effort: Pulmonary effort is normal.      Breath sounds: Normal breath sounds.   Musculoskeletal:      Right lower leg: No edema.      Left lower leg: No edema.   Skin:     General: Skin is warm and dry.          Neurological:      Mental Status: She is alert and oriented to person, place, and time.      Cranial Nerves: No cranial nerve deficit.      Gait: Gait is intact.   Psychiatric:         Mood and Affect: Mood and affect normal.        DATA REVIEW    Lab Results   Component Value Date/Time    CHOLSTRLTOT 111 04/09/2024 07:23 AM    LDL 45 04/09/2024 07:23 AM    HDL 51 04/09/2024 07:23 AM    TRIGLYCERIDE 74 04/09/2024 07:23 AM       Lab Results   Component Value Date/Time    SODIUM 138 04/09/2024 07:21 AM    POTASSIUM 4.2 04/09/2024 07:21 AM    CHLORIDE 102 04/09/2024 07:21 AM    CO2 22 04/09/2024 07:21 AM    GLUCOSE 103 (H) 04/09/2024 07:21 AM    BUN 17 04/09/2024 07:21 AM    CREATININE 1.03  04/09/2024 07:21 AM     Lab Results   Component Value Date/Time    ALKPHOSPHAT 120 (H) 04/09/2024 07:21 AM    ASTSGOT 23 04/09/2024 07:21 AM    ALTSGPT 11 04/09/2024 07:21 AM    TBILIRUBIN 0.7 04/09/2024 07:21 AM       Lab Results   Component Value Date/Time    HBA1C 5.9 (H) 04/09/2024 07:21 AM       Lab Results   Component Value Date/Time    MALBCRT 23 10/20/2023 08:46 AM    MICROALBUR 5.9 10/20/2023 08:46 AM         Cardiovascular Imaging:    CTPA 2020  No PE  Atherosclerotic plaque is seen in the aorta.     US RUQ 2022  1.  Hepatic steatosis and borderline hepatomegaly.  2.  3.1 cm hepatic cyst.    SONYA 4/19/24  Normal ankle brachial indices.     RIGHT      Waveform            Systolic BPs (mmHg)                              126           Brachial   Triphasic                                Common Femoral   Biphasic                                 Popliteal   Biphasic                   146           Posterior Tibial   Biphasic                   138           Dorsalis Pedis                                            Digit                              1.14          SONYA                                            TBI                           LEFT   Waveform        Systolic BPs (mmHg)                              128           Brachial   Biphasic                                 Common Femoral   Biphasic                                 Popliteal   Biphasic                   147           Posterior Tibial   Biphasic                   140           Dorsalis Pedis                                            Digit                              1.15          SONYA                                            TBI    BLE VR duplex 4/19/24   No superficial or deep venous thrombosis.   No significant reflux of the superficial or deep veins.        Medical Decision Making:  Today's Assessment / Status / Plan:     1. Atherosclerosis of aorta (HCC)        2. Hypertensive kidney disease with stage 3a chronic kidney disease        3.  "Severe obesity (BMI >= 40) (HCC)        4. Unspecified open wound, left lower leg, initial encounter        5. Stasis pigmentation        6. Left leg swelling        7. Prediabetes        8. Hepatic steatosis        9. Dyslipidemia        10. Acquired hypothyroidism           Etiology of Established CVD if Present:     1) abnl quantaflo - asymptomatic  4/2024 SONYA normal with tri-/biphasic pulse waveforms throughout effectively rues out LE PAD;  appear the quantaflo reading was false positive   - No indications of true chronic limb threatening ischemia (CLTI) changes or hx of lifestyle-limiting symptoms, pulses normal with minimal reduction in bilat PT pulses   - hx of abnormal quantaflo with reduced R only 0.71 with normal on LLE however the symptom concerns are for the leg leg  - limited, observation evidence is available on accuracy of Quantaflo for dx of PAD and outcomes data, so no clinical dx can be made on this exam and true SONYA needs to be assessed to aid in actual dx.    - per article \"The Accuracy of a Volume Plethysmography System as Assessed by Contrast Angiography\". J Prev Med Vol.3 No.2: 15.:  Using contrast angiography as the gold standard for determining peripheral artery disease, the volume plethysmography system (ie. Quantaflo) had sensitivity of 86.0%, specificity of 100% and accuracy of 87.5%.  indicating roughly 14% false positive rate   Plan:  - no further w/u recommended     2) CHRONIC VENOUS DISEASE  - mild, stable with progression   4/2024 - normal venous reflux study w/o DVT or VR   LLE CEAP classification: C3S / Ep / An / Pn  RLE CEAP classification: C0   - mainly notable L med malleolar stasis pig with derm that is localized - mainly resolved   Plan:  - no further w/u   - continue exercise, skin care   - f/u if worsening ove rtime     3) Non-aneurysmal aortic atherosclerosis (AS) - per CT, no symptoms or prior known associated clinical manifestations  - general indicator of systemic AS " with higher potential AS in other vascular beds  - Higher overall ASCVD risk, no complex features   Plan:  - no further imaging surveillance, continue med mgmt      LIPID MANAGEMENT  Qualifies for Statin Therapy Based on 2018 ACC/AHA Guidelines: yes, Secondary prevention - >74yo, ASCVD not at very high risk   10-yr ASCVD risk score: The ASCVD Risk score (Kelle ZUNIGA, et al., 2019) failed to calculate., N/A  Major ASCVD events: Symptomatic PAD (hx of claudication with SONYA <0.85, previous revascularization/amputation)   - possibly not symptomatic   High-risk conditions: >64yr old , Hypertension , and CKD (egfr 15-59)  Risk-enhancers: N/A  Currently on Statin: Yes  Tx goals: LDL-C <70 preferred   At goal? Yes , 4/2024   Plan:   - continue rosuva 10mg daily  - defer ongoing lab surveillance to PCP     BLOOD PRESSURE MANAGEMENT  Office BP Goal ACC/AHA (2017) goal <130/80  Home BP at goal:  yes  Office BP at goal:  yes  24h ABPM:  not ordered to date   RDN candidate? NO  Contributing factors: obesity   Plan:   Monitoring:   - start/continue home BP monitoring, reviewed correct technique, provide BP log and instructions  Medications:  - continue telmisartan 40mg daily  - consider amlodipine as next agent     GLYCEMIC MANAGEMENT Prediabetic  Lab Results   Component Value Date    HBA1C 5.9 (H) 04/09/2024    Plan:  - continue healthy diet, weight reduction, daily physical activity   - consider metformin initiation up to 850mg BID if A1c 6.2+ in future to reduce T2D progression  - monitor labs Q6-12mo     ANTITHROMBOTIC THERAPY not indicated     LIFESTYLE INTERVENTIONS:  TOB: continued complete avoidance of all tobacco products   Physical Activity: 40+ min/days most days - >150min/week to achieve CV health  Wt Mgmt/Nutrition Mediterranean    - overall wt reduction will improve preDM, venous return - focus on 5-7% BW loss     OTHER:     # elevated ALP with hepatic steatosis - chronic mild high ALP with normal GGT, could indicated  ALP is bone origin  Plan: defer further evaluation and w/u to PCP     Studies to Be Obtained: none   Labs to Be Obtained: none     Follow up in: vasc med prn     Gabriel Mcconnell M.D.   [facility]  [facility]  [phone]    Cc:

## 2024-05-07 ENCOUNTER — HOSPITAL ENCOUNTER (OUTPATIENT)
Dept: LAB | Facility: MEDICAL CENTER | Age: 84
End: 2024-05-07
Attending: INTERNAL MEDICINE
Payer: MEDICARE

## 2024-05-07 DIAGNOSIS — N18.31 STAGE 3A CHRONIC KIDNEY DISEASE: ICD-10-CM

## 2024-05-07 DIAGNOSIS — R80.9 MICROALBUMINURIA: ICD-10-CM

## 2024-05-07 DIAGNOSIS — E55.9 VITAMIN D DEFICIENCY DISEASE: ICD-10-CM

## 2024-05-07 DIAGNOSIS — D64.9 ANEMIA, UNSPECIFIED TYPE: ICD-10-CM

## 2024-05-07 LAB
25(OH)D3 SERPL-MCNC: 72 NG/ML (ref 30–100)
ANION GAP SERPL CALC-SCNC: 9 MMOL/L (ref 7–16)
APPEARANCE UR: CLEAR
BACTERIA #/AREA URNS HPF: NEGATIVE /HPF
BILIRUB UR QL STRIP.AUTO: NEGATIVE
BUN SERPL-MCNC: 17 MG/DL (ref 8–22)
CALCIUM SERPL-MCNC: 9 MG/DL (ref 8.5–10.5)
CHLORIDE SERPL-SCNC: 105 MMOL/L (ref 96–112)
CO2 SERPL-SCNC: 24 MMOL/L (ref 20–33)
COLOR UR: YELLOW
CREAT SERPL-MCNC: 1.12 MG/DL (ref 0.5–1.4)
CREAT UR-MCNC: 124.21 MG/DL
EPI CELLS #/AREA URNS HPF: NEGATIVE /HPF
ERYTHROCYTE [DISTWIDTH] IN BLOOD BY AUTOMATED COUNT: 48.3 FL (ref 35.9–50)
GFR SERPLBLD CREATININE-BSD FMLA CKD-EPI: 49 ML/MIN/1.73 M 2
GLUCOSE SERPL-MCNC: 112 MG/DL (ref 65–99)
GLUCOSE UR STRIP.AUTO-MCNC: NEGATIVE MG/DL
HCT VFR BLD AUTO: 41.1 % (ref 37–47)
HGB BLD-MCNC: 13.5 G/DL (ref 12–16)
HYALINE CASTS #/AREA URNS LPF: ABNORMAL /LPF
KETONES UR STRIP.AUTO-MCNC: NEGATIVE MG/DL
LEUKOCYTE ESTERASE UR QL STRIP.AUTO: ABNORMAL
MCH RBC QN AUTO: 30.3 PG (ref 27–33)
MCHC RBC AUTO-ENTMCNC: 32.8 G/DL (ref 32.2–35.5)
MCV RBC AUTO: 92.2 FL (ref 81.4–97.8)
MICRO URNS: ABNORMAL
MICROALBUMIN UR-MCNC: 2.2 MG/DL
MICROALBUMIN/CREAT UR: 18 MG/G (ref 0–30)
NITRITE UR QL STRIP.AUTO: NEGATIVE
PH UR STRIP.AUTO: 6.5 [PH] (ref 5–8)
PLATELET # BLD AUTO: 204 K/UL (ref 164–446)
PMV BLD AUTO: 9.8 FL (ref 9–12.9)
POTASSIUM SERPL-SCNC: 4.9 MMOL/L (ref 3.6–5.5)
PROT UR QL STRIP: 30 MG/DL
PTH-INTACT SERPL-MCNC: 55.1 PG/ML (ref 14–72)
RBC # BLD AUTO: 4.46 M/UL (ref 4.2–5.4)
RBC # URNS HPF: ABNORMAL /HPF
RBC UR QL AUTO: NEGATIVE
SODIUM SERPL-SCNC: 138 MMOL/L (ref 135–145)
SP GR UR STRIP.AUTO: 1.01
UROBILINOGEN UR STRIP.AUTO-MCNC: 0.2 MG/DL
WBC # BLD AUTO: 7.5 K/UL (ref 4.8–10.8)
WBC #/AREA URNS HPF: ABNORMAL /HPF

## 2024-05-15 ENCOUNTER — OFFICE VISIT (OUTPATIENT)
Dept: NEPHROLOGY | Facility: MEDICAL CENTER | Age: 84
End: 2024-05-15
Payer: MEDICARE

## 2024-05-15 VITALS
DIASTOLIC BLOOD PRESSURE: 82 MMHG | TEMPERATURE: 98.2 F | OXYGEN SATURATION: 95 % | SYSTOLIC BLOOD PRESSURE: 118 MMHG | HEART RATE: 87 BPM | WEIGHT: 229 LBS | BODY MASS INDEX: 44.96 KG/M2 | HEIGHT: 60 IN

## 2024-05-15 DIAGNOSIS — D64.9 ANEMIA, UNSPECIFIED TYPE: ICD-10-CM

## 2024-05-15 DIAGNOSIS — E55.9 VITAMIN D DEFICIENCY DISEASE: ICD-10-CM

## 2024-05-15 DIAGNOSIS — N18.31 STAGE 3A CHRONIC KIDNEY DISEASE: ICD-10-CM

## 2024-05-15 DIAGNOSIS — I10 ESSENTIAL HYPERTENSION: ICD-10-CM

## 2024-05-15 DIAGNOSIS — R80.9 MICROALBUMINURIA: ICD-10-CM

## 2024-05-15 PROCEDURE — 3079F DIAST BP 80-89 MM HG: CPT | Performed by: INTERNAL MEDICINE

## 2024-05-15 PROCEDURE — 3074F SYST BP LT 130 MM HG: CPT | Performed by: INTERNAL MEDICINE

## 2024-05-15 PROCEDURE — 99213 OFFICE O/P EST LOW 20 MIN: CPT | Performed by: INTERNAL MEDICINE

## 2024-05-15 ASSESSMENT — ENCOUNTER SYMPTOMS
HEMOPTYSIS: 0
FLANK PAIN: 0
COUGH: 0
EYES NEGATIVE: 1
WEIGHT LOSS: 0
FEVER: 0
ABDOMINAL PAIN: 0
HYPERTENSION: 1
PALPITATIONS: 0
VOMITING: 0
NAUSEA: 0
SHORTNESS OF BREATH: 0
CHILLS: 0
WHEEZING: 0
DIARRHEA: 0
ORTHOPNEA: 0
SINUS PAIN: 0

## 2024-05-15 ASSESSMENT — FIBROSIS 4 INDEX: FIB4 SCORE: 2.75

## 2024-05-15 NOTE — PATIENT INSTRUCTIONS
continue current treatment,             low salt diet,             keep well hydrated,             avoid NSAID's,             weight loss

## 2024-05-15 NOTE — PROGRESS NOTES
Subjective:      Jammie Berry is a 83 y.o. female who presents with Chronic Kidney Disease and Hypertension            Chronic Kidney Disease  Pertinent negatives include no abdominal pain, chest pain, chills, congestion, coughing, fever, nausea or vomiting.   Hypertension  Pertinent negatives include no chest pain, malaise/fatigue, orthopnea, palpitations or shortness of breath.     Jammie is coming today for f/u of CKD IIIa, HTN  Doing well , no complaints  No difficulties to urinate  No dysuria/hematuria/flank pain  No edema  CKD IIIa -creat level stable at baseline 1.0-1.1 -to monitor  HTN:BP well controlled, compliant to low Na diet, medications    Review of Systems   Constitutional:  Negative for chills, fever, malaise/fatigue and weight loss.   HENT:  Negative for congestion, hearing loss and sinus pain.    Eyes: Negative.    Respiratory:  Negative for cough, hemoptysis, shortness of breath and wheezing.    Cardiovascular:  Negative for chest pain, palpitations, orthopnea and leg swelling.   Gastrointestinal:  Negative for abdominal pain, diarrhea, nausea and vomiting.   Genitourinary:  Negative for dysuria, flank pain, frequency, hematuria and urgency.   Skin: Negative.    All other systems reviewed and are negative.    Past Medical History:   Diagnosis Date    Abnormal Pap smear of cervix     Dr. Dhaval Franco    CKD (chronic kidney disease), stage III 9/4/2015    COVID-19 11/2020    was on O2 for abouyt 5m    Essential hypertension 9/4/2015    Other specified hypothyroidism 9/3/2015    Tuberculosis     exposed. treated as a child       Family History   Problem Relation Age of Onset    Heart Attack Mother     Alcohol abuse Mother         etoh    Lung Disease Father         smoker    Alcohol abuse Father         etoh    Heart Attack Father     Hypertension Sister     Hyperlipidemia Sister     Lung Disease Sister         smoker    Colon Cancer Sister     Kidney Disease Sister     Lung Disease Maternal  Aunt     Heart Disease Maternal Aunt     Hypertension Maternal Aunt     Hyperlipidemia Maternal Aunt     Stroke Maternal Grandmother     Other Maternal Grandmother         TB of the brain    Stroke Maternal Grandfather     Colon Cancer Paternal Grandmother     No Known Problems Paternal Grandfather     Ovarian Cancer Neg Hx     Tubal Cancer Neg Hx     Peritoneal Cancer Neg Hx     Colorectal Cancer Neg Hx     Breast Cancer Neg Hx     Diabetes Neg Hx        Social History     Socioeconomic History    Marital status:     Number of children: 0   Occupational History    Occupation: retired CNA   Tobacco Use    Smoking status: Former     Current packs/day: 0.00     Average packs/day: 0.3 packs/day for 12.0 years (3.0 ttl pk-yrs)     Types: Cigarettes     Start date: 1965     Quit date: 1977     Years since quittin.4    Smokeless tobacco: Never    Tobacco comments:     Started smoking at age 25 (social smoker)   Vaping Use    Vaping status: Never Used   Substance and Sexual Activity    Alcohol use: No     Alcohol/week: 0.0 oz    Drug use: No    Sexual activity: Never     Partners: Male     Comment: vaginal atrophy-dysparunia     Social Determinants of Health     Food Insecurity: No Food Insecurity (2020)    Hunger Vital Sign     Worried About Running Out of Food in the Last Year: Never true     Ran Out of Food in the Last Year: Never true   Transportation Needs: No Transportation Needs (2020)    PRAPARE - Transportation     Lack of Transportation (Medical): No     Lack of Transportation (Non-Medical): No          Objective:     /82 (BP Location: Right arm, Patient Position: Sitting, BP Cuff Size: Adult)   Pulse 87   Temp 36.8 °C (98.2 °F) (Temporal)   Ht 1.524 m (5')   Wt 104 kg (229 lb)   SpO2 95%   BMI 44.72 kg/m²      Physical Exam  Vitals reviewed.   Constitutional:       General: She is not in acute distress.     Appearance: Normal appearance. She is well-developed. She is  obese. She is not diaphoretic.   HENT:      Head: Normocephalic and atraumatic.      Nose: Nose normal.      Mouth/Throat:      Mouth: Mucous membranes are moist.      Pharynx: Oropharynx is clear.   Eyes:      Extraocular Movements: Extraocular movements intact.      Conjunctiva/sclera: Conjunctivae normal.      Pupils: Pupils are equal, round, and reactive to light.   Cardiovascular:      Rate and Rhythm: Normal rate and regular rhythm.      Pulses: Normal pulses.      Heart sounds: Normal heart sounds.   Pulmonary:      Effort: Pulmonary effort is normal. No respiratory distress.      Breath sounds: Normal breath sounds. No wheezing or rales.   Abdominal:      General: Bowel sounds are normal. There is no distension.      Palpations: Abdomen is soft. There is no mass.      Tenderness: There is no abdominal tenderness. There is no right CVA tenderness or left CVA tenderness.   Musculoskeletal:      Cervical back: Normal range of motion and neck supple.      Right lower leg: No edema.      Left lower leg: No edema.   Skin:     General: Skin is warm.      Coloration: Skin is not pale.      Findings: No erythema or rash.   Neurological:      General: No focal deficit present.      Mental Status: She is alert and oriented to person, place, and time.      Cranial Nerves: No cranial nerve deficit.      Coordination: Coordination normal.   Psychiatric:         Mood and Affect: Mood normal.         Behavior: Behavior normal.         Thought Content: Thought content normal.         Judgment: Judgment normal.               Laboratory results reviewed; d/w pt   Latest Reference Range & Units 04/11/23 08:29 04/11/23 08:30 10/20/23 08:46 04/09/24 07:21 04/09/24 07:23 05/07/24 07:30 05/07/24 07:31   WBC 4.8 - 10.8 K/uL 9.2 9.2     7.5   RBC 4.20 - 5.40 M/uL 4.75 4.75     4.46   Hemoglobin 12.0 - 16.0 g/dL 13.8 13.9     13.5   Hematocrit 37.0 - 47.0 % 43.4 43.4     41.1   MCV 81.4 - 97.8 fL 91.4 91.4     92.2   MCH 27.0 - 33.0  pg 29.1 29.3     30.3   MCHC 32.2 - 35.5 g/dL 31.8 (L) 32.0 (L)     32.8   RDW 35.9 - 50.0 fL 50.0 49.6     48.3   Platelet Count 164 - 446 K/uL 253 250     204   MPV 9.0 - 12.9 fL 9.7 9.7     9.8   Neutrophils-Polys 44.00 - 72.00 % 55.80         Neutrophils (Absolute) 2.00 - 7.15 K/uL 5.12         Lymphocytes 22.00 - 41.00 % 27.70         Lymphs (Absolute) 1.00 - 4.80 K/uL 2.55         Monocytes 0.00 - 13.40 % 11.30         Monos (Absolute) 0.00 - 0.85 K/uL 1.04 (H)         Eosinophils 0.00 - 6.90 % 3.90         Eos (Absolute) 0.00 - 0.51 K/uL 0.36         Basophils 0.00 - 1.80 % 1.10         Baso (Absolute) 0.00 - 0.12 K/uL 0.10         Immature Granulocytes 0.00 - 0.90 % 0.20         Immature Granulocytes (abs) 0.00 - 0.11 K/uL 0.02         Nucleated RBC /100 WBC 0.00         NRBC (Absolute) K/uL 0.00         Sed Rate Westergren 0 - 25 mm/hour    35 (H)      Sodium 135 - 145 mmol/L 141 141 139 138   138   Potassium 3.6 - 5.5 mmol/L 4.7 4.6 4.1 4.2   4.9   Chloride 96 - 112 mmol/L 107 108 105 102   105   Co2 20 - 33 mmol/L 18 (L) 20 24 22   24   Anion Gap 7.0 - 16.0  16.0 13.0 10.0 14.0   9.0   Glucose 65 - 99 mg/dL 93 105 (H) 105 (H) 103 (H)   112 (H)   Bun 8 - 22 mg/dL 28 (H) 28 (H) 14 17   17   Creatinine 0.50 - 1.40 mg/dL 1.27 1.20 0.96 1.03   1.12   GFR (CKD-EPI) >60 mL/min/1.73 m 2 43 ! 46 ! 59 ! 54 !   49 !   Calcium 8.5 - 10.5 mg/dL 9.0 9.1 9.0 9.2   9.0   Correct Calcium 8.5 - 10.5 mg/dL 9.2   9.4      AST(SGOT) 12 - 45 U/L 23   23      ALT(SGPT) 2 - 50 U/L 15   11      Alkaline Phosphatase 30 - 99 U/L 136 (H)   120 (H)      Total Bilirubin 0.1 - 1.5 mg/dL 0.5   0.7      Albumin 3.2 - 4.9 g/dL 3.7   3.7      Total Protein 6.0 - 8.2 g/dL 7.4   7.1      Globulin 1.9 - 3.5 g/dL 3.7 (H)   3.4      A-G Ratio g/dL 1.0   1.1      Gamma Gt 7 - 34 U/L    10      Glycohemoglobin 4.0 - 5.6 % 6.2 (H)   5.9 (H)      Estim. Avg Glu mg/dL 131   123      Cholesterol,Tot 100 - 199 mg/dL     111     Triglycerides 0 - 149  mg/dL     74     HDL >=40 mg/dL     51     LDL <100 mg/dL     45     Alkaline Phosphatase 40 - 120 U/L 151 (H)         Bone Fractions 0 - 55 U/L 42         Liver Fractions 0 - 94 U/L 109 (H)         Alk Phos Other Calc U/L 0         Micro Alb Creat Ratio 0 - 30 mg/g  see below 23   18    Creatinine, Urine mg/dL  63.05 252.33   124.21    Microalbumin, Urine Random mg/dL  <1.2 5.9   2.2    Urobilinogen, Urine Negative   0.2    0.2    Color   Yellow    Yellow    Character   Clear    Clear    Specific Gravity <1.035   1.013    1.015    Ph 5.0 - 8.0   6.5    6.5    Glucose Negative mg/dL  Negative    Negative    Ketones Negative mg/dL  Negative    Negative    Bilirubin Negative   Negative    Negative    Occult Blood Negative   Negative    Negative    Protein Negative mg/dL  Negative    30 !    Nitrite Negative   Negative    Negative    Leukocyte Esterase Negative   Negative    Small !    Micro Urine Req   see below    Microscopic    WBC /hpf      10-20 !    RBC /hpf      0-2    Epithelial Cells /hpf      Negative    Bacteria None /hpf      Negative    Hyaline Cast /lpf      0-2    25-Hydroxy   Vitamin D 25 30 - 100 ng/mL 67 65   67  72   TSH 0.380 - 5.330 uIU/mL     3.300     Stat C-Reactive Protein 0.00 - 0.75 mg/dL    <0.30      Pth, Intact 14.0 - 72.0 pg/mL  44.6     55.1   (L): Data is abnormally low  (H): Data is abnormally high  !: Data is abnormal      Assessment/Plan:     1.  CKD IIIa       Creat level stable at baseline -to monitor       Keep well hydrated    2. Essential hypertension      BP well controlled      Continue current treatment    3. Vitamin D deficiency disease      Vit D level and PTH well controlled WNL    4. Anemia, unspecified type      Hb stable -WNL    5. Microalbuminuria:-negative    Recs: continue current treatment,             low salt diet,             keep well hydrated,             avoid NSAID's,             weight loss             F/u in 6 months

## 2024-07-30 ENCOUNTER — TELEPHONE (OUTPATIENT)
Dept: HEALTH INFORMATION MANAGEMENT | Facility: OTHER | Age: 84
End: 2024-07-30

## 2024-07-30 ENCOUNTER — OFFICE VISIT (OUTPATIENT)
Dept: MEDICAL GROUP | Facility: PHYSICIAN GROUP | Age: 84
End: 2024-07-30
Payer: MEDICARE

## 2024-07-30 VITALS
SYSTOLIC BLOOD PRESSURE: 118 MMHG | HEIGHT: 60 IN | WEIGHT: 229 LBS | TEMPERATURE: 96.8 F | HEART RATE: 74 BPM | OXYGEN SATURATION: 93 % | BODY MASS INDEX: 44.96 KG/M2 | RESPIRATION RATE: 18 BRPM | DIASTOLIC BLOOD PRESSURE: 64 MMHG

## 2024-07-30 DIAGNOSIS — N18.31 HYPERTENSIVE KIDNEY DISEASE WITH STAGE 3A CHRONIC KIDNEY DISEASE: ICD-10-CM

## 2024-07-30 DIAGNOSIS — Z78.0 POSTMENOPAUSAL STATUS (AGE-RELATED) (NATURAL): ICD-10-CM

## 2024-07-30 DIAGNOSIS — M25.562 CHRONIC PAIN OF LEFT KNEE: ICD-10-CM

## 2024-07-30 DIAGNOSIS — E03.9 ACQUIRED HYPOTHYROIDISM: ICD-10-CM

## 2024-07-30 DIAGNOSIS — G89.29 CHRONIC PAIN OF LEFT KNEE: ICD-10-CM

## 2024-07-30 DIAGNOSIS — N95.1 MENOPAUSAL STATE: ICD-10-CM

## 2024-07-30 DIAGNOSIS — E78.5 DYSLIPIDEMIA: ICD-10-CM

## 2024-07-30 DIAGNOSIS — M17.12 PRIMARY OSTEOARTHRITIS OF LEFT KNEE: ICD-10-CM

## 2024-07-30 DIAGNOSIS — I10 PRIMARY HYPERTENSION: ICD-10-CM

## 2024-07-30 DIAGNOSIS — I12.9 HYPERTENSIVE KIDNEY DISEASE WITH STAGE 3A CHRONIC KIDNEY DISEASE: ICD-10-CM

## 2024-07-30 RX ORDER — TRIAMCINOLONE ACETONIDE 40 MG/ML
40 INJECTION, SUSPENSION INTRA-ARTICULAR; INTRAMUSCULAR ONCE
Status: COMPLETED | OUTPATIENT
Start: 2024-07-30 | End: 2024-07-30

## 2024-07-30 RX ADMIN — TRIAMCINOLONE ACETONIDE 40 MG: 40 INJECTION, SUSPENSION INTRA-ARTICULAR; INTRAMUSCULAR at 14:49

## 2024-07-30 ASSESSMENT — FIBROSIS 4 INDEX: FIB4 SCORE: 2.75

## 2024-08-15 ENCOUNTER — HOSPITAL ENCOUNTER (OUTPATIENT)
Dept: RADIOLOGY | Facility: MEDICAL CENTER | Age: 84
End: 2024-08-15
Payer: MEDICARE

## 2024-08-15 DIAGNOSIS — Z78.0 POSTMENOPAUSAL STATUS (AGE-RELATED) (NATURAL): ICD-10-CM

## 2024-08-15 DIAGNOSIS — N95.1 MENOPAUSAL STATE: ICD-10-CM

## 2024-08-15 PROCEDURE — 77080 DXA BONE DENSITY AXIAL: CPT

## 2024-08-15 PROCEDURE — 77067 SCR MAMMO BI INCL CAD: CPT

## 2024-10-02 ENCOUNTER — OFFICE VISIT (OUTPATIENT)
Dept: MEDICAL GROUP | Facility: PHYSICIAN GROUP | Age: 84
End: 2024-10-02
Payer: MEDICARE

## 2024-10-02 VITALS
BODY MASS INDEX: 45.75 KG/M2 | WEIGHT: 233 LBS | SYSTOLIC BLOOD PRESSURE: 132 MMHG | RESPIRATION RATE: 18 BRPM | DIASTOLIC BLOOD PRESSURE: 84 MMHG | HEART RATE: 84 BPM | TEMPERATURE: 97 F | OXYGEN SATURATION: 92 % | HEIGHT: 60 IN

## 2024-10-02 DIAGNOSIS — I12.9 HYPERTENSIVE KIDNEY DISEASE WITH STAGE 3A CHRONIC KIDNEY DISEASE: ICD-10-CM

## 2024-10-02 DIAGNOSIS — Z00.00 MEDICARE ANNUAL WELLNESS VISIT, SUBSEQUENT: Primary | ICD-10-CM

## 2024-10-02 DIAGNOSIS — E78.5 DYSLIPIDEMIA: ICD-10-CM

## 2024-10-02 DIAGNOSIS — E78.00 ELEVATED LDL CHOLESTEROL LEVEL: ICD-10-CM

## 2024-10-02 DIAGNOSIS — I70.0 ATHEROSCLEROSIS OF AORTA (HCC): ICD-10-CM

## 2024-10-02 DIAGNOSIS — E66.01 SEVERE OBESITY (BMI >= 40) (HCC): ICD-10-CM

## 2024-10-02 DIAGNOSIS — G89.29 CHRONIC PAIN OF LEFT KNEE: ICD-10-CM

## 2024-10-02 DIAGNOSIS — N18.31 HYPERTENSIVE KIDNEY DISEASE WITH STAGE 3A CHRONIC KIDNEY DISEASE: ICD-10-CM

## 2024-10-02 DIAGNOSIS — M25.562 CHRONIC PAIN OF LEFT KNEE: ICD-10-CM

## 2024-10-02 DIAGNOSIS — E03.9 ACQUIRED HYPOTHYROIDISM: ICD-10-CM

## 2024-10-02 DIAGNOSIS — I10 PRIMARY HYPERTENSION: ICD-10-CM

## 2024-10-02 PROCEDURE — 3079F DIAST BP 80-89 MM HG: CPT

## 2024-10-02 PROCEDURE — 3075F SYST BP GE 130 - 139MM HG: CPT

## 2024-10-02 PROCEDURE — G0439 PPPS, SUBSEQ VISIT: HCPCS

## 2024-10-02 RX ORDER — ROSUVASTATIN CALCIUM 10 MG/1
10 TABLET, COATED ORAL EVERY EVENING
Qty: 100 TABLET | Refills: 3 | Status: SHIPPED | OUTPATIENT
Start: 2024-10-02

## 2024-10-02 RX ORDER — LEVOTHYROXINE SODIUM 50 UG/1
50 TABLET ORAL
Qty: 100 TABLET | Refills: 3 | Status: SHIPPED | OUTPATIENT
Start: 2024-10-02

## 2024-10-02 ASSESSMENT — FIBROSIS 4 INDEX: FIB4 SCORE: 2.75

## 2024-10-02 ASSESSMENT — ENCOUNTER SYMPTOMS: GENERAL WELL-BEING: GOOD

## 2024-10-02 ASSESSMENT — PATIENT HEALTH QUESTIONNAIRE - PHQ9: CLINICAL INTERPRETATION OF PHQ2 SCORE: 0

## 2024-10-02 ASSESSMENT — ACTIVITIES OF DAILY LIVING (ADL): BATHING_REQUIRES_ASSISTANCE: 0

## 2024-11-21 ENCOUNTER — HOSPITAL ENCOUNTER (OUTPATIENT)
Dept: LAB | Facility: MEDICAL CENTER | Age: 84
End: 2024-11-21
Attending: INTERNAL MEDICINE
Payer: MEDICARE

## 2024-11-21 DIAGNOSIS — D64.9 ANEMIA, UNSPECIFIED TYPE: ICD-10-CM

## 2024-11-21 DIAGNOSIS — I10 ESSENTIAL HYPERTENSION: ICD-10-CM

## 2024-11-21 DIAGNOSIS — N18.31 STAGE 3A CHRONIC KIDNEY DISEASE: ICD-10-CM

## 2024-11-21 DIAGNOSIS — R80.9 MICROALBUMINURIA: ICD-10-CM

## 2024-11-21 LAB
ANION GAP SERPL CALC-SCNC: 13 MMOL/L (ref 7–16)
BUN SERPL-MCNC: 21 MG/DL (ref 8–22)
CALCIUM SERPL-MCNC: 8.9 MG/DL (ref 8.5–10.5)
CHLORIDE SERPL-SCNC: 105 MMOL/L (ref 96–112)
CO2 SERPL-SCNC: 20 MMOL/L (ref 20–33)
CREAT SERPL-MCNC: 1.04 MG/DL (ref 0.5–1.4)
CREAT UR-MCNC: 253.57 MG/DL
ERYTHROCYTE [DISTWIDTH] IN BLOOD BY AUTOMATED COUNT: 49.3 FL (ref 35.9–50)
GFR SERPLBLD CREATININE-BSD FMLA CKD-EPI: 54 ML/MIN/1.73 M 2
GLUCOSE SERPL-MCNC: 112 MG/DL (ref 65–99)
HCT VFR BLD AUTO: 44.2 % (ref 37–47)
HGB BLD-MCNC: 14.1 G/DL (ref 12–16)
MCH RBC QN AUTO: 30.1 PG (ref 27–33)
MCHC RBC AUTO-ENTMCNC: 31.9 G/DL (ref 32.2–35.5)
MCV RBC AUTO: 94.4 FL (ref 81.4–97.8)
MICROALBUMIN UR-MCNC: 9.8 MG/DL
MICROALBUMIN/CREAT UR: 39 MG/G (ref 0–30)
PLATELET # BLD AUTO: 254 K/UL (ref 164–446)
PMV BLD AUTO: 9.6 FL (ref 9–12.9)
POTASSIUM SERPL-SCNC: 4.1 MMOL/L (ref 3.6–5.5)
RBC # BLD AUTO: 4.68 M/UL (ref 4.2–5.4)
SODIUM SERPL-SCNC: 138 MMOL/L (ref 135–145)
WBC # BLD AUTO: 9.9 K/UL (ref 4.8–10.8)

## 2024-11-21 PROCEDURE — 36415 COLL VENOUS BLD VENIPUNCTURE: CPT

## 2024-11-21 PROCEDURE — 80048 BASIC METABOLIC PNL TOTAL CA: CPT

## 2024-11-21 PROCEDURE — 82043 UR ALBUMIN QUANTITATIVE: CPT

## 2024-11-21 PROCEDURE — 82570 ASSAY OF URINE CREATININE: CPT

## 2024-11-21 PROCEDURE — 85027 COMPLETE CBC AUTOMATED: CPT

## 2024-11-27 ENCOUNTER — OFFICE VISIT (OUTPATIENT)
Dept: NEPHROLOGY | Facility: MEDICAL CENTER | Age: 84
End: 2024-11-27
Payer: MEDICARE

## 2024-11-27 VITALS
TEMPERATURE: 98.3 F | WEIGHT: 237 LBS | OXYGEN SATURATION: 91 % | HEART RATE: 79 BPM | BODY MASS INDEX: 46.53 KG/M2 | SYSTOLIC BLOOD PRESSURE: 120 MMHG | DIASTOLIC BLOOD PRESSURE: 68 MMHG | HEIGHT: 60 IN

## 2024-11-27 DIAGNOSIS — R80.9 MICROALBUMINURIA: ICD-10-CM

## 2024-11-27 DIAGNOSIS — E55.9 VITAMIN D DEFICIENCY DISEASE: ICD-10-CM

## 2024-11-27 DIAGNOSIS — I10 ESSENTIAL HYPERTENSION: ICD-10-CM

## 2024-11-27 DIAGNOSIS — D64.9 ANEMIA, UNSPECIFIED TYPE: ICD-10-CM

## 2024-11-27 DIAGNOSIS — N18.31 STAGE 3A CHRONIC KIDNEY DISEASE: ICD-10-CM

## 2024-11-27 ASSESSMENT — ENCOUNTER SYMPTOMS
HEMOPTYSIS: 0
WEIGHT LOSS: 0
CHILLS: 0
WHEEZING: 0
SINUS PAIN: 0
VOMITING: 0
FLANK PAIN: 0
EYES NEGATIVE: 1
HYPERTENSION: 1
ABDOMINAL PAIN: 0
COUGH: 0
NAUSEA: 0
PALPITATIONS: 0
SHORTNESS OF BREATH: 0
ORTHOPNEA: 0
FEVER: 0

## 2024-11-27 ASSESSMENT — FIBROSIS 4 INDEX: FIB4 SCORE: 2.24

## 2024-11-27 NOTE — PROGRESS NOTES
Subjective:      Jammie Berry is a 84 y.o. female who presents with Chronic Kidney Disease            Chronic Kidney Disease  Pertinent negatives include no abdominal pain, chest pain, chills, congestion, coughing, fever, nausea or vomiting.   Hypertension  Pertinent negatives include no chest pain, malaise/fatigue, orthopnea, palpitations or shortness of breath.     Jammie is coming today for f/u of CKD IIIa, HTN  Doing well , no complaints  No difficulties to urinate  No dysuria/hematuria/flank pain  No edema  CKD IIIa -creat level stable at baseline 1.0-1.1 -to monitor  HTN:BP well controlled, compliant to low Na diet, medications    Review of Systems   Constitutional:  Negative for chills, fever, malaise/fatigue and weight loss.   HENT:  Negative for congestion, hearing loss and sinus pain.    Eyes: Negative.    Respiratory:  Negative for cough, hemoptysis, shortness of breath and wheezing.    Cardiovascular:  Negative for chest pain, palpitations, orthopnea and leg swelling.   Gastrointestinal:  Negative for abdominal pain, nausea and vomiting.   Genitourinary:  Negative for dysuria, flank pain, frequency, hematuria and urgency.   Skin: Negative.    All other systems reviewed and are negative.    Past Medical History:   Diagnosis Date    Abnormal Pap smear of cervix     Dr. Dhaval Franco    CKD (chronic kidney disease), stage III 9/4/2015    COVID-19 11/2020    was on O2 for abouyt 5m    Essential hypertension 9/4/2015    Other specified hypothyroidism 9/3/2015    Tuberculosis     exposed. treated as a child       Family History   Problem Relation Age of Onset    Heart Attack Mother     Alcohol abuse Mother         etoh    Lung Disease Father         smoker    Alcohol abuse Father         etoh    Heart Attack Father     Hypertension Sister     Hyperlipidemia Sister     Lung Disease Sister         smoker    Colon Cancer Sister     Kidney Disease Sister     Lung Disease Maternal Aunt     Heart Disease  Maternal Aunt     Hypertension Maternal Aunt     Hyperlipidemia Maternal Aunt     Stroke Maternal Grandmother     Other Maternal Grandmother         TB of the brain    Stroke Maternal Grandfather     Colon Cancer Paternal Grandmother     No Known Problems Paternal Grandfather     Ovarian Cancer Neg Hx     Tubal Cancer Neg Hx     Peritoneal Cancer Neg Hx     Colorectal Cancer Neg Hx     Breast Cancer Neg Hx     Diabetes Neg Hx        Social History     Socioeconomic History    Marital status:     Number of children: 0   Occupational History    Occupation: retired CNA   Tobacco Use    Smoking status: Former     Current packs/day: 0.00     Average packs/day: 0.3 packs/day for 12.0 years (3.0 ttl pk-yrs)     Types: Cigarettes     Start date: 1965     Quit date: 1977     Years since quittin.9    Smokeless tobacco: Never    Tobacco comments:     Started smoking at age 25 (social smoker)   Vaping Use    Vaping status: Never Used   Substance and Sexual Activity    Alcohol use: No     Alcohol/week: 0.0 oz    Drug use: No    Sexual activity: Never     Partners: Male     Comment: vaginal atrophy-dysparunia     Social Drivers of Health     Food Insecurity: No Food Insecurity (2020)    Hunger Vital Sign     Worried About Running Out of Food in the Last Year: Never true     Ran Out of Food in the Last Year: Never true   Transportation Needs: No Transportation Needs (2020)    PRAPARE - Transportation     Lack of Transportation (Medical): No     Lack of Transportation (Non-Medical): No          Objective:     /68 (BP Location: Right arm, Patient Position: Sitting, BP Cuff Size: Large adult)   Pulse 79   Temp 36.8 °C (98.3 °F) (Temporal)   Ht 1.524 m (5')   Wt 108 kg (237 lb)   SpO2 91%   BMI 46.29 kg/m²      Physical Exam  Vitals reviewed.   Constitutional:       General: She is not in acute distress.     Appearance: Normal appearance. She is well-developed. She is obese. She is not  diaphoretic.   HENT:      Head: Normocephalic and atraumatic.      Nose: Nose normal.      Mouth/Throat:      Mouth: Mucous membranes are moist.      Pharynx: Oropharynx is clear.   Eyes:      Extraocular Movements: Extraocular movements intact.      Conjunctiva/sclera: Conjunctivae normal.      Pupils: Pupils are equal, round, and reactive to light.   Cardiovascular:      Rate and Rhythm: Normal rate and regular rhythm.      Pulses: Normal pulses.      Heart sounds: Normal heart sounds.   Pulmonary:      Effort: Pulmonary effort is normal. No respiratory distress.      Breath sounds: Normal breath sounds. No wheezing or rales.   Abdominal:      General: Bowel sounds are normal. There is no distension.      Palpations: Abdomen is soft. There is no mass.      Tenderness: There is no abdominal tenderness. There is no right CVA tenderness or left CVA tenderness.   Musculoskeletal:      Cervical back: Normal range of motion and neck supple.      Right lower leg: No edema.      Left lower leg: No edema.   Skin:     General: Skin is warm.      Coloration: Skin is not pale.      Findings: No erythema or rash.   Neurological:      General: No focal deficit present.      Mental Status: She is alert and oriented to person, place, and time.      Cranial Nerves: No cranial nerve deficit.      Coordination: Coordination normal.   Psychiatric:         Mood and Affect: Mood normal.         Behavior: Behavior normal.         Thought Content: Thought content normal.         Judgment: Judgment normal.               Laboratory results reviewed; d/w pt   Latest Reference Range & Units 05/07/24 07:31 08/15/24 15:00 11/21/24 07:16   WBC 4.8 - 10.8 K/uL 7.5  9.9   RBC 4.20 - 5.40 M/uL 4.46  4.68   Hemoglobin 12.0 - 16.0 g/dL 13.5  14.1   Hematocrit 37.0 - 47.0 % 41.1  44.2   MCV 81.4 - 97.8 fL 92.2  94.4   MCH 27.0 - 33.0 pg 30.3  30.1   MCHC 32.2 - 35.5 g/dL 32.8  31.9 (L)   RDW 35.9 - 50.0 fL 48.3  49.3   Platelet Count 164 - 446 K/uL  204  254   MPV 9.0 - 12.9 fL 9.8  9.6   Sodium 135 - 145 mmol/L 138  138   Potassium 3.6 - 5.5 mmol/L 4.9  4.1   Chloride 96 - 112 mmol/L 105  105   Co2 20 - 33 mmol/L 24  20   Anion Gap 7.0 - 16.0  9.0  13.0   Glucose 65 - 99 mg/dL 112 (H)  112 (H)   Bun 8 - 22 mg/dL 17  21   Creatinine 0.50 - 1.40 mg/dL 1.12  1.04   GFR (CKD-EPI) >60 mL/min/1.73 m 2 49 !  54 !   Calcium 8.5 - 10.5 mg/dL 9.0  8.9   Micro Alb Creat Ratio 0 - 30 mg/g   39 (H)   Creatinine, Urine mg/dL   253.57   Microalbumin, Urine Random mg/dL   9.8   25-Hydroxy   Vitamin D 25 30 - 100 ng/mL 72     DS-BONE DENSITY STUDY (DEXA)   Rpt    (L): Data is abnormally low  (H): Data is abnormally high  !: Data is abnormal  Rpt: View report in Results Review for more information      Assessment/Plan:     1.  CKD IIIa       Creat level stable at baseline -to monitor       Keep well hydrated    2. Essential hypertension      BP well controlled      Continue current treatment    3. Vitamin D deficiency disease      Vit D level and PTH well controlled WNL    4. Anemia, unspecified type      Hb stable -WNL    5. Microalbuminuria:- very mild    Recs: continue current treatment,             low salt diet,             keep well hydrated,             avoid NSAID's,             weight loss             F/u in 6 months

## 2025-01-07 ENCOUNTER — OFFICE VISIT (OUTPATIENT)
Dept: MEDICAL GROUP | Facility: PHYSICIAN GROUP | Age: 85
End: 2025-01-07
Payer: MEDICARE

## 2025-01-07 VITALS
RESPIRATION RATE: 12 BRPM | BODY MASS INDEX: 46.53 KG/M2 | HEART RATE: 71 BPM | WEIGHT: 237 LBS | SYSTOLIC BLOOD PRESSURE: 120 MMHG | DIASTOLIC BLOOD PRESSURE: 68 MMHG | TEMPERATURE: 97.5 F | OXYGEN SATURATION: 91 % | HEIGHT: 60 IN

## 2025-01-07 DIAGNOSIS — E55.9 VITAMIN D DEFICIENCY: ICD-10-CM

## 2025-01-07 DIAGNOSIS — N18.31 STAGE 3A CHRONIC KIDNEY DISEASE: ICD-10-CM

## 2025-01-07 DIAGNOSIS — I70.0 ATHEROSCLEROSIS OF AORTA (HCC): ICD-10-CM

## 2025-01-07 DIAGNOSIS — G89.29 CHRONIC PAIN OF LEFT KNEE: ICD-10-CM

## 2025-01-07 DIAGNOSIS — N18.32 STAGE 3B CHRONIC KIDNEY DISEASE: ICD-10-CM

## 2025-01-07 DIAGNOSIS — E03.9 ACQUIRED HYPOTHYROIDISM: ICD-10-CM

## 2025-01-07 DIAGNOSIS — G89.29 CHRONIC PAIN OF RIGHT KNEE: ICD-10-CM

## 2025-01-07 DIAGNOSIS — E66.01 SEVERE OBESITY (HCC): ICD-10-CM

## 2025-01-07 DIAGNOSIS — Z13.0 SCREENING FOR DEFICIENCY ANEMIA: ICD-10-CM

## 2025-01-07 DIAGNOSIS — N18.31 HYPERTENSIVE KIDNEY DISEASE WITH STAGE 3A CHRONIC KIDNEY DISEASE: ICD-10-CM

## 2025-01-07 DIAGNOSIS — I10 ESSENTIAL HYPERTENSION: ICD-10-CM

## 2025-01-07 DIAGNOSIS — R73.09 ELEVATED HEMOGLOBIN A1C: ICD-10-CM

## 2025-01-07 DIAGNOSIS — E78.5 DYSLIPIDEMIA: ICD-10-CM

## 2025-01-07 DIAGNOSIS — E78.00 ELEVATED LDL CHOLESTEROL LEVEL: ICD-10-CM

## 2025-01-07 DIAGNOSIS — M25.561 CHRONIC PAIN OF RIGHT KNEE: ICD-10-CM

## 2025-01-07 DIAGNOSIS — M25.562 CHRONIC PAIN OF LEFT KNEE: ICD-10-CM

## 2025-01-07 DIAGNOSIS — I12.9 HYPERTENSIVE KIDNEY DISEASE WITH STAGE 3A CHRONIC KIDNEY DISEASE: ICD-10-CM

## 2025-01-07 PROCEDURE — 20610 DRAIN/INJ JOINT/BURSA W/O US: CPT | Mod: RT

## 2025-01-07 PROCEDURE — 20610 DRAIN/INJ JOINT/BURSA W/O US: CPT | Mod: LT

## 2025-01-07 PROCEDURE — 99214 OFFICE O/P EST MOD 30 MIN: CPT | Mod: 25

## 2025-01-07 PROCEDURE — 3078F DIAST BP <80 MM HG: CPT

## 2025-01-07 PROCEDURE — 3074F SYST BP LT 130 MM HG: CPT

## 2025-01-07 RX ORDER — TRIAMCINOLONE ACETONIDE 40 MG/ML
40 INJECTION, SUSPENSION INTRA-ARTICULAR; INTRAMUSCULAR ONCE
Status: COMPLETED | OUTPATIENT
Start: 2025-01-07 | End: 2025-01-07

## 2025-01-07 RX ORDER — TELMISARTAN 40 MG/1
40 TABLET ORAL
Qty: 100 TABLET | Refills: 3 | Status: SHIPPED | OUTPATIENT
Start: 2025-01-07

## 2025-01-07 RX ORDER — ROSUVASTATIN CALCIUM 10 MG/1
10 TABLET, COATED ORAL EVERY EVENING
Qty: 100 TABLET | Refills: 3 | Status: SHIPPED | OUTPATIENT
Start: 2025-01-07

## 2025-01-07 RX ORDER — LEVOTHYROXINE SODIUM 50 UG/1
50 TABLET ORAL
Qty: 100 TABLET | Refills: 3 | Status: SHIPPED | OUTPATIENT
Start: 2025-01-07

## 2025-01-07 RX ADMIN — TRIAMCINOLONE ACETONIDE 40 MG: 40 INJECTION, SUSPENSION INTRA-ARTICULAR; INTRAMUSCULAR at 14:59

## 2025-01-07 RX ADMIN — TRIAMCINOLONE ACETONIDE 40 MG: 40 INJECTION, SUSPENSION INTRA-ARTICULAR; INTRAMUSCULAR at 14:58

## 2025-01-07 ASSESSMENT — PATIENT HEALTH QUESTIONNAIRE - PHQ9: CLINICAL INTERPRETATION OF PHQ2 SCORE: 0

## 2025-01-07 ASSESSMENT — FIBROSIS 4 INDEX: FIB4 SCORE: 2.24

## 2025-01-07 NOTE — PROGRESS NOTES
Verbal consent was acquired by the patient to use [employer] ambient listening note generation during this visit     Subjective:     CC: Diagnoses of Severe obesity (HCC), Body mass index (BMI) of 45.0-49.9 in adult (HCC), Hypertensive kidney disease with stage 3a chronic kidney disease, Stage 3a chronic kidney disease, Chronic pain of left knee, Chronic pain of right knee, Stage 3b chronic kidney disease, Essential hypertension, Atherosclerosis of aorta (HCC), Elevated LDL cholesterol level, Acquired hypothyroidism, Dyslipidemia, Vitamin D deficiency, Screening for deficiency anemia, and Elevated hemoglobin A1c were pertinent to this visit.    HPI:   Jammie presents today with    History of Present Illness  The patient is an 84-year-old female who presents today with ongoing chronic bilateral knee pain, requesting steroid injections.    She has been experiencing persistent bilateral knee pain, which she quantifies as a 6 or 7 on a scale of 0 to 10. The pain intensifies during periods of standing, walking, or prolonged activity. She expresses a preference for cortisone injections in both knees. Her current pain management regimen includes Tylenol and topical application of Aspercreme with lidocaine.    She is currently on Synthroid, Crestor, atenolol, and vitamin C. She is requesting refills for her medications.    She has received her RSV vaccine and is inquiring about any other vaccines that are available.    FAMILY HISTORY  Her mother  at the age of 64.    MEDICATIONS  Tylenol, vitamin C, vitamin D, Synthroid, Crestor, atenolol, Aspercreme with lidocaine.    IMMUNIZATIONS  She is up-to-date on all her vaccines, including shingles, COVID-19, influenza, pneumonia series, and tetanus (good until ).    Current Outpatient Medications   Medication Sig Dispense Refill    telmisartan (MICARDIS) 40 MG Tab Take 1 Tablet by mouth every day. 100 Tablet 3    rosuvastatin (CRESTOR) 10 MG Tab Take 1 Tablet by  mouth every evening. 100 Tablet 3    levothyroxine (SYNTHROID) 50 MCG Tab Take 1 Tablet by mouth every day. 100 Tablet 3    atenolol (TENORMIN) 25 MG Tab TAKE 1 TABLET BY MOUTH EVERY  Tablet 3    zinc sulfate (ZINCATE) 220 (50 Zn) MG Cap Take 220 mg by mouth every day.      Ascorbic Acid (VITAMIN C) 1000 MG Tab Take  by mouth.      acetaminophen (TYLENOL) 325 MG Tab Take 2 Tabs by mouth every 6 hours as needed (Mild Pain; (Pain scale 1-3); Temp greater than 100.5 F). 30 Tab 0    cyanocobalamin (VITAMIN B-12) 500 MCG Tab Take 500 mcg by mouth every day.      Cholecalciferol (VITAMIN D-3) 25 MCG (1000 UT) Cap Take 1,000 Units by mouth every day.      therapeutic multivitamin-minerals (THERAGRAN-M) TABS Take 1 Tab by mouth every morning.       No current facility-administered medications for this visit.       Problem   Chronic Pain of Right Knee   Chronic Pain of Left Knee   Dyslipidemia   Acquired Hypothyroidism    Diagnosed 2014. Managed with levothyroxine 50 mcg since.  Last TSH 1.24 in August 2016. 3.17 TSH level 2.01       ROS:  Review of Systems   Musculoskeletal:  Positive for joint pain (bilateral knee).   All other systems reviewed and are negative.      Objective:     Exam:  /68 (BP Location: Left arm, Patient Position: Sitting, BP Cuff Size: Large adult)   Pulse 71   Temp 36.4 °C (97.5 °F) (Temporal)   Resp 12   Ht 1.524 m (5')   Wt 108 kg (237 lb)   SpO2 91%   BMI 46.29 kg/m²  Body mass index is 46.29 kg/m².    Physical Exam  Vitals reviewed.   Constitutional:       General: She is not in acute distress.     Appearance: Normal appearance. She is obese. She is not ill-appearing.   HENT:      Head: Normocephalic and atraumatic.   Cardiovascular:      Rate and Rhythm: Normal rate.      Pulses: Normal pulses.   Pulmonary:      Effort: Pulmonary effort is normal. No respiratory distress.   Musculoskeletal:      Right knee: Tenderness present over the medial joint line.      Left knee:  Tenderness present over the medial joint line.   Skin:     General: Skin is warm and dry.      Findings: No rash.   Neurological:      General: No focal deficit present.      Mental Status: She is alert and oriented to person, place, and time.      Gait: Gait abnormal (cane).   Psychiatric:         Mood and Affect: Mood normal.         Behavior: Behavior normal.           Assessment & Plan:     84 y.o. female with the following -     Assessment & Plan  1. Chronic bilateral knee pain.  Her kidney function remains stable, as per the most recent evaluation. She has been advised to continue with Tylenol for pain management, given her renal condition. A referral to an orthopedic specialist has been suggested for further exploration of non-surgical treatment options such as stem cells or protein-rich plasma. Today, she will receive steroid injections in both knees, preceded by the administration of lidocaine for numbing.    2. Medication management.  Refills for her current medications, including Synthroid, Crestor, and atenolol, have been provided.    3. Health maintenance.  She is up-to-date on all her vaccines, including shingles, COVID-19, influenza, and pneumonia. Her tetanus vaccine is valid until 2026.    PROCEDURE  Steroid injections were administered in both knees today, preceded by the administration of lidocaine for numbing.    Problem List Items Addressed This Visit          Family Medicine Problems    Chronic pain of left knee     Chronic, ongoing. Requesting repeat steroid injection as this has helped with chronic knee pain.    Has been doing massage/ice/heat/ topical analgesics,tylenol for chronic pain    Continue conservative measures.         Relevant Orders    Consent for all Surgical, Special Diagnostic or Therapeutic Procedures       Other    Acquired hypothyroidism     Chronic, stable. Continue levothyroxine 50 mcg daily on an empty stomach.          Relevant Medications    levothyroxine (SYNTHROID)  50 MCG Tab    Other Relevant Orders    TSH    Stage 3a chronic kidney disease    Relevant Medications    telmisartan (MICARDIS) 40 MG Tab    Atherosclerosis of aorta (HCC)    Relevant Medications    telmisartan (MICARDIS) 40 MG Tab    rosuvastatin (CRESTOR) 10 MG Tab    Dyslipidemia     Chronic, stable. Continue rosuvastatin 10 mg nighlty         Hypertensive kidney disease with stage 3a chronic kidney disease    Chronic pain of right knee     Chronic, ongoing. Reports worsening symptoms in right knee progressive. Reports pain 6-7/10. Worse with prolonged standing/walking/weight bearing.   Consider ortho referral.  Using tylenol for pain   Avoid nsaids for ckd  Requesting steroid injection as this has been helpful for left knee.    Has been doing massage/ice/heat/ topical analgesics,tylenol for chronic pain    Continue conservative measures.           Relevant Orders    Consent for all Surgical, Special Diagnostic or Therapeutic Procedures     Other Visit Diagnoses       Severe obesity (HCC)        Relevant Orders    HEMOGLOBIN A1C    Comp Metabolic Panel    Lipid Profile    Body mass index (BMI) of 45.0-49.9 in adult ([facility])        Relevant Orders    Patient identified as having weight management issue.  Appropriate orders and counseling given.    Stage 3b chronic kidney disease        Relevant Medications    telmisartan (MICARDIS) 40 MG Tab    Other Relevant Orders    Comp Metabolic Panel    CBC WITHOUT DIFFERENTIAL    Essential hypertension        Relevant Medications    telmisartan (MICARDIS) 40 MG Tab    rosuvastatin (CRESTOR) 10 MG Tab    Elevated LDL cholesterol level        Relevant Medications    rosuvastatin (CRESTOR) 10 MG Tab    Vitamin D deficiency        Relevant Orders    VITAMIN D,25 HYDROXY (DEFICIENCY)    Screening for deficiency anemia        Relevant Orders    CBC WITHOUT DIFFERENTIAL    Elevated hemoglobin A1c        Relevant Orders    HEMOGLOBIN A1C          [facility] Gap Form    Diagnosis: E66.01 -  Severe obesity (HCC)  Z68.42 - Body mass index (BMI) of 45.0-49.9 in adult (HCC)  The current BMI is 46.29 kg/m² as of 01/07/25.    Past BMI Values:  01/07/25 : 46.29 kg/m². 11/27/24 : 46.29 kg/m². 10/02/24 : 45.50 kg/m².   Assessment and plan: Chronic, stable. Encouraged healthy diet and physical activity changes with a goal of weight loss. Follow up at least annually.  Diagnosis to address: I12.9, N18.31 - Hypertensive kidney disease with stage 3a chronic kidney disease  Assessment and plan: Chronic, stable. Continue with current defined treatment plan:   Renal dose of medication  Avoid nephrotoxins  Continue same medication regimen  Recheck labs in 3-6 months Follow-up at least annually.  Diagnosis: N18.31 - Stage 3a chronic kidney disease  Assessment and plan: Chronic, stable. Continue with current defined treatment plan:   Renal dose of medication  Avoid nephrotoxins  Continue same medication regimen  Recheck labs in 3-6 months Follow-up at least annually.  Last edited 01/07/25 14:17 PST by LANE Armendariz.         Patient was educated in proper administration of medication(s) ordered today including safety, possible SE, risks, benefits, rationale and alternatives to therapy.   Supportive care, differential diagnoses, and indications for immediate follow-up discussed with patient.    Pathogenesis of diagnosis discussed including typical length and natural progression.    Instructed to return to clinic or nearest emergency department for any change in condition, further concerns, or worsening of symptoms.  Patient states understanding of the plan of care and discharge instructions.    Return in about 4 months (around 5/7/2025), or if symptoms worsen or fail to improve.    Please note that this dictation was created using voice recognition software. I have made every reasonable attempt to correct obvious errors, but I expect that there are errors of grammar and possibly content that I did not  discover before finalizing the note.

## 2025-01-07 NOTE — ASSESSMENT & PLAN NOTE
Chronic, ongoing. Requesting repeat steroid injection as this has helped with chronic knee pain.    Has been doing massage/ice/heat/ topical analgesics,tylenol for chronic pain    Continue conservative measures.

## 2025-01-07 NOTE — PROCEDURES
Joint Inj - LG: knee, R knee on 1/7/2025 3:58 PM  Indications: pain  Details: 22 G needle, anteromedial approach  Aspirate: 0 mL clear  Outcome: tolerated well, no immediate complications    A steroid injection was performed at right knee using 4 ml 1% plain Lidocaine and 40 mg of Kenalog. This was well tolerated.   Procedure, treatment alternatives, risks and benefits explained, specific risks discussed. Consent was given by the patient. Immediately prior to procedure a time out was called to verify the correct patient, procedure, equipment, support staff and site/side marked as required. Patient was prepped and draped in the usual sterile fashion.       Joint Inj - LG: knee, L knee on 1/7/2025 4:00 PM  Indications: pain  Details: 22 G needle, anteromedial approach  Aspirate: 0 mL clear  Outcome: tolerated well, no immediate complications    A steroid injection was performed at left knee using 4 ml 1% plain Lidocaine and 40 mg of Kenalog. This was well tolerated.   Procedure, treatment alternatives, risks and benefits explained, specific risks discussed. Consent was given by the patient. Immediately prior to procedure a time out was called to verify the correct patient, procedure, equipment, support staff and site/side marked as required. Patient was prepped and draped in the usual sterile fashion.

## 2025-01-07 NOTE — ASSESSMENT & PLAN NOTE
Chronic, ongoing. Reports worsening symptoms in right knee progressive. Reports pain 6-7/10. Worse with prolonged standing/walking/weight bearing.   Consider ortho referral.  Using tylenol for pain   Avoid nsaids for ckd  Requesting steroid injection as this has been helpful for left knee.    Has been doing massage/ice/heat/ topical analgesics,tylenol for chronic pain    Continue conservative measures.

## 2025-02-13 ENCOUNTER — TELEPHONE (OUTPATIENT)
Dept: HEALTH INFORMATION MANAGEMENT | Facility: OTHER | Age: 85
End: 2025-02-13
Payer: MEDICARE

## 2025-02-14 DIAGNOSIS — I10 ESSENTIAL HYPERTENSION: ICD-10-CM

## 2025-02-14 RX ORDER — ATENOLOL 25 MG/1
25 TABLET ORAL
Qty: 100 TABLET | Refills: 3 | Status: SHIPPED | OUTPATIENT
Start: 2025-02-14

## 2025-04-09 ENCOUNTER — HOSPITAL ENCOUNTER (OUTPATIENT)
Dept: LAB | Facility: MEDICAL CENTER | Age: 85
End: 2025-04-09
Payer: MEDICARE

## 2025-04-09 DIAGNOSIS — R73.09 ELEVATED HEMOGLOBIN A1C: ICD-10-CM

## 2025-04-09 DIAGNOSIS — E66.01 SEVERE OBESITY (HCC): ICD-10-CM

## 2025-04-09 DIAGNOSIS — E03.9 ACQUIRED HYPOTHYROIDISM: ICD-10-CM

## 2025-04-09 DIAGNOSIS — N18.32 STAGE 3B CHRONIC KIDNEY DISEASE: ICD-10-CM

## 2025-04-09 DIAGNOSIS — E55.9 VITAMIN D DEFICIENCY: ICD-10-CM

## 2025-04-09 DIAGNOSIS — Z13.0 SCREENING FOR DEFICIENCY ANEMIA: ICD-10-CM

## 2025-04-09 LAB
ERYTHROCYTE [DISTWIDTH] IN BLOOD BY AUTOMATED COUNT: 54.3 FL (ref 35.9–50)
EST. AVERAGE GLUCOSE BLD GHB EST-MCNC: 123 MG/DL
HBA1C MFR BLD: 5.9 % (ref 4–5.6)
HCT VFR BLD AUTO: 40.9 % (ref 37–47)
HGB BLD-MCNC: 13.2 G/DL (ref 12–16)
MCH RBC QN AUTO: 31.1 PG (ref 27–33)
MCHC RBC AUTO-ENTMCNC: 32.3 G/DL (ref 32.2–35.5)
MCV RBC AUTO: 96.5 FL (ref 81.4–97.8)
PLATELET # BLD AUTO: 243 K/UL (ref 164–446)
PMV BLD AUTO: 9.5 FL (ref 9–12.9)
RBC # BLD AUTO: 4.24 M/UL (ref 4.2–5.4)
WBC # BLD AUTO: 9.3 K/UL (ref 4.8–10.8)

## 2025-04-09 PROCEDURE — 80061 LIPID PANEL: CPT

## 2025-04-09 PROCEDURE — 80053 COMPREHEN METABOLIC PANEL: CPT

## 2025-04-09 PROCEDURE — 82306 VITAMIN D 25 HYDROXY: CPT

## 2025-04-09 PROCEDURE — 36415 COLL VENOUS BLD VENIPUNCTURE: CPT

## 2025-04-09 PROCEDURE — 85027 COMPLETE CBC AUTOMATED: CPT

## 2025-04-09 PROCEDURE — 84443 ASSAY THYROID STIM HORMONE: CPT

## 2025-04-09 PROCEDURE — 83036 HEMOGLOBIN GLYCOSYLATED A1C: CPT

## 2025-04-10 ENCOUNTER — RESULTS FOLLOW-UP (OUTPATIENT)
Dept: MEDICAL GROUP | Facility: PHYSICIAN GROUP | Age: 85
End: 2025-04-10

## 2025-04-10 LAB
25(OH)D3 SERPL-MCNC: 59 NG/ML (ref 30–100)
ALBUMIN SERPL BCP-MCNC: 3.7 G/DL (ref 3.2–4.9)
ALBUMIN/GLOB SERPL: 1.2 G/DL
ALP SERPL-CCNC: 126 U/L (ref 30–99)
ALT SERPL-CCNC: 14 U/L (ref 2–50)
ANION GAP SERPL CALC-SCNC: 14 MMOL/L (ref 7–16)
AST SERPL-CCNC: 22 U/L (ref 12–45)
BILIRUB SERPL-MCNC: 0.5 MG/DL (ref 0.1–1.5)
BUN SERPL-MCNC: 21 MG/DL (ref 8–22)
CALCIUM ALBUM COR SERPL-MCNC: 9.4 MG/DL (ref 8.5–10.5)
CALCIUM SERPL-MCNC: 9.2 MG/DL (ref 8.5–10.5)
CHLORIDE SERPL-SCNC: 107 MMOL/L (ref 96–112)
CHOLEST SERPL-MCNC: 118 MG/DL (ref 100–199)
CO2 SERPL-SCNC: 22 MMOL/L (ref 20–33)
CREAT SERPL-MCNC: 0.98 MG/DL (ref 0.5–1.4)
GFR SERPLBLD CREATININE-BSD FMLA CKD-EPI: 57 ML/MIN/1.73 M 2
GLOBULIN SER CALC-MCNC: 3.2 G/DL (ref 1.9–3.5)
GLUCOSE SERPL-MCNC: 102 MG/DL (ref 65–99)
HDLC SERPL-MCNC: 64 MG/DL
LDLC SERPL CALC-MCNC: 42 MG/DL
POTASSIUM SERPL-SCNC: 4.8 MMOL/L (ref 3.6–5.5)
PROT SERPL-MCNC: 6.9 G/DL (ref 6–8.2)
SODIUM SERPL-SCNC: 143 MMOL/L (ref 135–145)
TRIGL SERPL-MCNC: 58 MG/DL (ref 0–149)
TSH SERPL-ACNC: 3.05 UIU/ML (ref 0.38–5.33)

## 2025-04-16 ENCOUNTER — OFFICE VISIT (OUTPATIENT)
Dept: MEDICAL GROUP | Facility: PHYSICIAN GROUP | Age: 85
End: 2025-04-16
Payer: MEDICARE

## 2025-04-16 VITALS
OXYGEN SATURATION: 91 % | BODY MASS INDEX: 47.12 KG/M2 | DIASTOLIC BLOOD PRESSURE: 62 MMHG | HEIGHT: 60 IN | RESPIRATION RATE: 20 BRPM | SYSTOLIC BLOOD PRESSURE: 118 MMHG | TEMPERATURE: 97.6 F | WEIGHT: 240 LBS | HEART RATE: 83 BPM

## 2025-04-16 DIAGNOSIS — G89.29 CHRONIC PAIN OF LEFT KNEE: ICD-10-CM

## 2025-04-16 DIAGNOSIS — G89.29 CHRONIC BILATERAL LOW BACK PAIN WITHOUT SCIATICA: ICD-10-CM

## 2025-04-16 DIAGNOSIS — M25.562 CHRONIC PAIN OF LEFT KNEE: ICD-10-CM

## 2025-04-16 DIAGNOSIS — E66.01 SEVERE OBESITY (BMI >= 40) (HCC): ICD-10-CM

## 2025-04-16 DIAGNOSIS — M54.50 CHRONIC BILATERAL LOW BACK PAIN WITHOUT SCIATICA: ICD-10-CM

## 2025-04-16 DIAGNOSIS — I10 ESSENTIAL HYPERTENSION: ICD-10-CM

## 2025-04-16 DIAGNOSIS — G89.29 CHRONIC PAIN OF RIGHT KNEE: ICD-10-CM

## 2025-04-16 DIAGNOSIS — M25.561 CHRONIC PAIN OF RIGHT KNEE: ICD-10-CM

## 2025-04-16 DIAGNOSIS — N18.31 STAGE 3A CHRONIC KIDNEY DISEASE: ICD-10-CM

## 2025-04-16 DIAGNOSIS — I10 PRIMARY HYPERTENSION: ICD-10-CM

## 2025-04-16 PROCEDURE — 3074F SYST BP LT 130 MM HG: CPT

## 2025-04-16 PROCEDURE — 3078F DIAST BP <80 MM HG: CPT

## 2025-04-16 PROCEDURE — 20610 DRAIN/INJ JOINT/BURSA W/O US: CPT | Mod: 50

## 2025-04-16 PROCEDURE — 99214 OFFICE O/P EST MOD 30 MIN: CPT | Mod: 25

## 2025-04-16 RX ORDER — TRIAMCINOLONE ACETONIDE 40 MG/ML
40 INJECTION, SUSPENSION INTRA-ARTICULAR; INTRAMUSCULAR ONCE
Status: COMPLETED | OUTPATIENT
Start: 2025-04-16 | End: 2025-04-16

## 2025-04-16 RX ORDER — ATENOLOL 25 MG/1
25 TABLET ORAL
Qty: 100 TABLET | Refills: 3 | Status: SHIPPED | OUTPATIENT
Start: 2025-04-16

## 2025-04-16 RX ADMIN — TRIAMCINOLONE ACETONIDE 40 MG: 40 INJECTION, SUSPENSION INTRA-ARTICULAR; INTRAMUSCULAR at 13:59

## 2025-04-16 ASSESSMENT — ENCOUNTER SYMPTOMS: BACK PAIN: 1

## 2025-04-16 ASSESSMENT — FIBROSIS 4 INDEX: FIB4 SCORE: 1.98

## 2025-04-16 NOTE — ASSESSMENT & PLAN NOTE
Chronic, stable. Bp in clinic today 118/62. Continue atenolol 25 mg daily, telmisartan 40 mg daily

## 2025-04-16 NOTE — ASSESSMENT & PLAN NOTE
Stable  No acute uremic symptoms  Renal dose of medication  Avoid nephrotoxins  Continue same medication regimen  Recheck labs as ordered by nephrology

## 2025-04-16 NOTE — ASSESSMENT & PLAN NOTE
Chronic, ongoing. Reports good relief of symptoms with steroid injection. Reports improved mobility and decreased pain. Requesting repeat joint injection to knees.  Given decreased GFR and nsaid not appropriate, does not wish to have surgery, using tylenol for breakthrough pain, this is appropriate up to 3 times annually.

## 2025-04-16 NOTE — PROGRESS NOTES
Verbal consent was acquired by the patient to use [employer] ambient listening note generation during this visit     Subjective:     CC: Diagnoses of Chronic pain of right knee, Chronic pain of left knee, Severe obesity (BMI >= 40) (HCC), Chronic bilateral low back pain without sciatica, Stage 3a chronic kidney disease, Essential hypertension, and Primary hypertension were pertinent to this visit.    HPI:   Jammie presents today with    History of Present Illness  The patient is an 84-year-old female who presents for knee pain, back pain, and blood pressure management.    Knee Pain  - Reports well-being but uncertain about summer activities due to knee pain.  - Last steroid injection in 01/2025, requests another today, plans next at year-end.  - Maintains active lifestyle with chair yoga and cycling.  - No recent falls, normal bowel and bladder functions.  - Uses IcyHot for knee pain, no knee braces due to poor fit.    Back Pain  - Back pain managed with IcyHot.    Blood Pressure Management  - Blood pressure well-controlled on atenolol.    Supplemental information: Under nephrologist care, appointment next month. Recent labs: stable kidney function, slightly elevated blood sugar, A1c 5.9, normal cholesterol, vitamin D, and thyroid hormone levels. Rash improved, no discomfort.    Current Outpatient Medications   Medication Sig Dispense Refill    atenolol (TENORMIN) 25 MG Tab Take 1 Tablet by mouth every day. 100 Tablet 3    telmisartan (MICARDIS) 40 MG Tab Take 1 Tablet by mouth every day. 100 Tablet 3    rosuvastatin (CRESTOR) 10 MG Tab Take 1 Tablet by mouth every evening. 100 Tablet 3    levothyroxine (SYNTHROID) 50 MCG Tab Take 1 Tablet by mouth every day. 100 Tablet 3    zinc sulfate (ZINCATE) 220 (50 Zn) MG Cap Take 220 mg by mouth every day.      Ascorbic Acid (VITAMIN C) 1000 MG Tab Take  by mouth.      acetaminophen (TYLENOL) 325 MG Tab Take 2 Tabs by mouth every 6 hours as needed (Mild Pain; (Pain  scale 1-3); Temp greater than 100.5 F). 30 Tab 0    cyanocobalamin (VITAMIN B-12) 500 MCG Tab Take 500 mcg by mouth every day.      Cholecalciferol (VITAMIN D-3) 25 MCG (1000 UT) Cap Take 1,000 Units by mouth every day.      therapeutic multivitamin-minerals (THERAGRAN-M) TABS Take 1 Tab by mouth every morning.       No current facility-administered medications for this visit.       Problem   Chronic Pain of Right Knee   Chronic Pain of Left Knee   Chronic Bilateral Low Back Pain Without Sciatica   Severe Obesity (Bmi >= 40) ([facility])   Stage 3a Chronic Kidney Disease    Followed by Dr. Mae, avoids nephrotoxins     Primary Hypertension    Stable on ARB and Beta blocker       ROS:  Review of Systems   Musculoskeletal:  Positive for back pain and joint pain.   All other systems reviewed and are negative.      Objective:     Exam:  /62 (BP Location: Left arm, Patient Position: Sitting, BP Cuff Size: Adult long)   Pulse 83   Temp 36.4 °C (97.6 °F) (Temporal)   Resp 20   Ht 1.524 m (5')   Wt 109 kg (240 lb)   SpO2 91%   BMI 46.87 kg/m²  Body mass index is 46.87 kg/m².    Physical Exam  Vitals reviewed.   Constitutional:       General: She is not in acute distress.     Appearance: Normal appearance. She is not ill-appearing.   HENT:      Head: Normocephalic and atraumatic.   Cardiovascular:      Rate and Rhythm: Normal rate.      Pulses: Normal pulses.   Pulmonary:      Effort: Pulmonary effort is normal. No respiratory distress.   Skin:     General: Skin is warm and dry.      Findings: No rash.   Neurological:      General: No focal deficit present.      Mental Status: She is alert and oriented to person, place, and time.      Gait: Gait abnormal (ambulating with cane).   Psychiatric:         Mood and Affect: Mood normal.         Behavior: Behavior normal.         Labs:    Latest Reference Range & Units 04/09/25 07:40   WBC 4.8 - 10.8 K/uL 9.3   RBC 4.20 - 5.40 M/uL 4.24   Hemoglobin 12.0 - 16.0 g/dL 13.2    Hematocrit 37.0 - 47.0 % 40.9   MCV 81.4 - 97.8 fL 96.5   MCH 27.0 - 33.0 pg 31.1   MCHC 32.2 - 35.5 g/dL 32.3   RDW 35.9 - 50.0 fL 54.3 (H)   Platelet Count 164 - 446 K/uL 243   MPV 9.0 - 12.9 fL 9.5   Sodium 135 - 145 mmol/L 143   Potassium 3.6 - 5.5 mmol/L 4.8   Chloride 96 - 112 mmol/L 107   Co2 20 - 33 mmol/L 22   Anion Gap 7.0 - 16.0  14.0   Glucose 65 - 99 mg/dL 102 (H)   Bun 8 - 22 mg/dL 21   Creatinine 0.50 - 1.40 mg/dL 0.98   GFR (CKD-EPI) >60 mL/min/1.73 m 2 57 !   Calcium 8.5 - 10.5 mg/dL 9.2   Correct Calcium 8.5 - 10.5 mg/dL 9.4   AST(SGOT) 12 - 45 U/L 22   ALT(SGPT) 2 - 50 U/L 14   Alkaline Phosphatase 30 - 99 U/L 126 (H)   Total Bilirubin 0.1 - 1.5 mg/dL 0.5   Albumin 3.2 - 4.9 g/dL 3.7   Total Protein 6.0 - 8.2 g/dL 6.9   Globulin 1.9 - 3.5 g/dL 3.2   A-G Ratio g/dL 1.2   Glycohemoglobin 4.0 - 5.6 % 5.9 (H)   Estim. Avg Glu mg/dL 123   Cholesterol,Tot 100 - 199 mg/dL 118   Triglycerides 0 - 149 mg/dL 58   HDL >=40 mg/dL 64   LDL <100 mg/dL 42   25-Hydroxy   Vitamin D 25 30 - 100 ng/mL 59   TSH 0.380 - 5.330 uIU/mL 3.050   (H): Data is abnormally high  !: Data is abnormal    Assessment & Plan:     84 y.o. female with the following -     Assessment & Plan  1. Knee pain: Chronic.  - Administered steroid injection.  - Advised to continue exercise and healthy diet.  - Discussed long-term steroid injection effects, including potential bone degradation.  - Suggested orthopedist referral for long-term management options.    2. Back pain.  - Uses IcyHot for relief.  - Advised to continue IcyHot as needed.    3. Blood pressure management.  - Normal readings.  - Refilled atenolol.    4. Health maintenance.  - Normal blood count.  - Slightly elevated blood glucose, stable A1c at 5.9 (prediabetes).  - Stable kidney function.  - Normal cholesterol, vitamin D, and thyroid hormone levels.  - Due for COVID-19 vaccine.    Follow-up  - Plan for follow-up in 3-4 months.    PROCEDURE  Bilateral knee steroid  injections    Pre-Procedure:  - Risks and Benefits: Potential bone degradation, pain relief  - Alternatives: Orthopedics referral  - Side effects: Bone wasting, potential worsening  - Consent: Verbal consent obtained    Intra-Procedure:  - Site Preparation: Knees cleaned  - Medication: Steroid injections  - Dressing: Band-Aids applied    Post-Procedure:  - Tolerated well, no significant pain  - Home Care: Follow-up in 3-4 months, consider orthopedics if pain persists    Problem List Items Addressed This Visit          Family Medicine Problems    Primary hypertension    Chronic, stable. Bp in clinic today 118/62. Continue atenolol 25 mg daily, telmisartan 40 mg daily          Relevant Medications    atenolol (TENORMIN) 25 MG Tab    Chronic bilateral low back pain without sciatica    Chronic, ongoing. Using topical analgesic for this as needed.         Chronic pain of left knee    Chronic, ongoing. Reports good relief of symptoms with steroid injection. Reports improved mobility and decreased pain. Requesting repeat joint injection to knees.  Given decreased GFR and nsaid not appropriate, does not wish to have surgery, using tylenol for breakthrough pain, this is appropriate up to 3 times annually.         Relevant Orders    Consent for all Surgical, Special Diagnostic or Therapeutic Procedures       Other    Stage 3a chronic kidney disease    Stable  No acute uremic symptoms  Renal dose of medication  Avoid nephrotoxins  Continue same medication regimen  Recheck labs as ordered by nephrology           Severe obesity (BMI >= 40) (HCC)    Chronic, unstable. Discussed healthy lifestyle recommendations.            Chronic pain of right knee    Chronic, ongoing. Reports good relief of symptoms with steroid injection. Reports improved mobility and decreased pain. Requesting repeat joint injection to knees.  Given decreased GFR and nsaid not appropriate, does not wish to have surgery, using tylenol for breakthrough pain,  this is appropriate up to 3 times annually.         Relevant Orders    Consent for all Surgical, Special Diagnostic or Therapeutic Procedures     Other Visit Diagnoses         Essential hypertension        Relevant Medications    atenolol (TENORMIN) 25 MG Tab          Patient was educated in proper administration of medication(s) ordered today including safety, possible SE, risks, benefits, rationale and alternatives to therapy.   Supportive care, differential diagnoses, and indications for immediate follow-up discussed with patient.    Pathogenesis of diagnosis discussed including typical length and natural progression.    Instructed to return to clinic or nearest emergency department for any change in condition, further concerns, or worsening of symptoms.  Patient states understanding of the plan of care and discharge instructions.    Return in about 3 months (around 7/16/2025), or if symptoms worsen or fail to improve.    Please note that this dictation was created using voice recognition software. I have made every reasonable attempt to correct obvious errors, but I expect that there are errors of grammar and possibly content that I did not discover before finalizing the note.

## 2025-04-17 NOTE — PROCEDURES
Joint Inj - LG: knee, bilateral knee on 4/16/2025 2:00 PM  Indications: pain  Details: 22 G needle, anteromedial approach  Aspirate (Right): 0 mL clear  Aspirate (Left): 0 mL clear  Outcome: tolerated well, no immediate complications    A steroid injection was performed at bilateral knees using 1% plain Lidocaine and 40 mg of Kenalog to bilateral knees. This was well tolerated.   Procedure, treatment alternatives, risks and benefits explained, specific risks discussed. Consent was given by the patient. Immediately prior to procedure a time out was called to verify the correct patient, procedure, equipment, support staff and site/side marked as required. Patient was prepped and draped in the usual sterile fashion.

## 2025-06-26 ENCOUNTER — HOSPITAL ENCOUNTER (OUTPATIENT)
Dept: LAB | Facility: MEDICAL CENTER | Age: 85
End: 2025-06-26
Attending: INTERNAL MEDICINE
Payer: MEDICARE

## 2025-06-26 DIAGNOSIS — D64.9 ANEMIA, UNSPECIFIED TYPE: ICD-10-CM

## 2025-06-26 DIAGNOSIS — N18.31 STAGE 3A CHRONIC KIDNEY DISEASE: ICD-10-CM

## 2025-06-26 LAB
25(OH)D3 SERPL-MCNC: 67 NG/ML (ref 30–100)
ANION GAP SERPL CALC-SCNC: 13 MMOL/L (ref 7–16)
APPEARANCE UR: CLEAR
BACTERIA #/AREA URNS HPF: NORMAL /HPF
BILIRUB UR QL STRIP.AUTO: NEGATIVE
BUN SERPL-MCNC: 21 MG/DL (ref 8–22)
CALCIUM SERPL-MCNC: 9.3 MG/DL (ref 8.5–10.5)
CASTS URNS QL MICRO: NORMAL /LPF (ref 0–2)
CHLORIDE SERPL-SCNC: 105 MMOL/L (ref 96–112)
CO2 SERPL-SCNC: 23 MMOL/L (ref 20–33)
COLOR UR: YELLOW
CREAT SERPL-MCNC: 1.16 MG/DL (ref 0.5–1.4)
EPITHELIAL CELLS 1715: NORMAL /HPF (ref 0–5)
ERYTHROCYTE [DISTWIDTH] IN BLOOD BY AUTOMATED COUNT: 49.4 FL (ref 35.9–50)
GFR SERPLBLD CREATININE-BSD FMLA CKD-EPI: 47 ML/MIN/1.73 M 2
GLUCOSE SERPL-MCNC: 95 MG/DL (ref 65–99)
GLUCOSE UR STRIP.AUTO-MCNC: NEGATIVE MG/DL
HCT VFR BLD AUTO: 39.8 % (ref 37–47)
HGB BLD-MCNC: 12.8 G/DL (ref 12–16)
KETONES UR STRIP.AUTO-MCNC: NEGATIVE MG/DL
LEUKOCYTE ESTERASE UR QL STRIP.AUTO: ABNORMAL
MCH RBC QN AUTO: 30.6 PG (ref 27–33)
MCHC RBC AUTO-ENTMCNC: 32.2 G/DL (ref 32.2–35.5)
MCV RBC AUTO: 95.2 FL (ref 81.4–97.8)
MICRO URNS: ABNORMAL
NITRITE UR QL STRIP.AUTO: NEGATIVE
PH UR STRIP.AUTO: 6.5 [PH] (ref 5–8)
PLATELET # BLD AUTO: 259 K/UL (ref 164–446)
PMV BLD AUTO: 9.5 FL (ref 9–12.9)
POTASSIUM SERPL-SCNC: 4.2 MMOL/L (ref 3.6–5.5)
PROT UR QL STRIP: 30 MG/DL
RBC # BLD AUTO: 4.18 M/UL (ref 4.2–5.4)
RBC # URNS HPF: NORMAL /HPF (ref 0–2)
RBC UR QL AUTO: NEGATIVE
SODIUM SERPL-SCNC: 141 MMOL/L (ref 135–145)
SP GR UR STRIP.AUTO: 1.02
UROBILINOGEN UR STRIP.AUTO-MCNC: 0.2 EU/DL
WBC # BLD AUTO: 9.1 K/UL (ref 4.8–10.8)
WBC #/AREA URNS HPF: NORMAL /HPF

## 2025-06-26 PROCEDURE — 81001 URINALYSIS AUTO W/SCOPE: CPT

## 2025-06-26 PROCEDURE — 36415 COLL VENOUS BLD VENIPUNCTURE: CPT

## 2025-06-26 PROCEDURE — 80048 BASIC METABOLIC PNL TOTAL CA: CPT

## 2025-06-26 PROCEDURE — 82306 VITAMIN D 25 HYDROXY: CPT

## 2025-06-26 PROCEDURE — 85027 COMPLETE CBC AUTOMATED: CPT

## 2025-07-03 ENCOUNTER — APPOINTMENT (OUTPATIENT)
Dept: NEPHROLOGY | Facility: MEDICAL CENTER | Age: 85
End: 2025-07-03
Payer: MEDICARE

## 2025-07-03 VITALS
TEMPERATURE: 96.9 F | OXYGEN SATURATION: 93 % | BODY MASS INDEX: 46.72 KG/M2 | DIASTOLIC BLOOD PRESSURE: 68 MMHG | SYSTOLIC BLOOD PRESSURE: 120 MMHG | WEIGHT: 238 LBS | HEIGHT: 60 IN | HEART RATE: 85 BPM

## 2025-07-03 DIAGNOSIS — D64.9 ANEMIA, UNSPECIFIED TYPE: ICD-10-CM

## 2025-07-03 DIAGNOSIS — R80.9 MICROALBUMINURIA: ICD-10-CM

## 2025-07-03 DIAGNOSIS — N18.31 STAGE 3A CHRONIC KIDNEY DISEASE: Primary | ICD-10-CM

## 2025-07-03 DIAGNOSIS — E55.9 VITAMIN D DEFICIENCY DISEASE: ICD-10-CM

## 2025-07-03 DIAGNOSIS — I10 ESSENTIAL HYPERTENSION: ICD-10-CM

## 2025-07-03 PROCEDURE — 99213 OFFICE O/P EST LOW 20 MIN: CPT | Performed by: INTERNAL MEDICINE

## 2025-07-03 PROCEDURE — 3074F SYST BP LT 130 MM HG: CPT | Performed by: INTERNAL MEDICINE

## 2025-07-03 PROCEDURE — 3078F DIAST BP <80 MM HG: CPT | Performed by: INTERNAL MEDICINE

## 2025-07-03 PROCEDURE — G2211 COMPLEX E/M VISIT ADD ON: HCPCS | Performed by: INTERNAL MEDICINE

## 2025-07-03 ASSESSMENT — ENCOUNTER SYMPTOMS
HEMOPTYSIS: 0
CHILLS: 0
WHEEZING: 0
EYES NEGATIVE: 1
COUGH: 0
SHORTNESS OF BREATH: 0
FEVER: 0
PALPITATIONS: 0
HYPERTENSION: 1
WEIGHT LOSS: 0
NAUSEA: 0
VOMITING: 0
DIARRHEA: 0
SINUS PAIN: 0
ORTHOPNEA: 0
ABDOMINAL PAIN: 0
FLANK PAIN: 0

## 2025-07-03 ASSESSMENT — FIBROSIS 4 INDEX: FIB4 SCORE: 1.86

## 2025-07-03 NOTE — PROGRESS NOTES
Subjective:      Jammie Berry is a 84 y.o. female who presents with Chronic Kidney Disease            Chronic Kidney Disease  Pertinent negatives include no abdominal pain, chest pain, chills, congestion, coughing, fever, nausea or vomiting.   Hypertension  Pertinent negatives include no chest pain, malaise/fatigue, orthopnea, palpitations or shortness of breath.     Jammei is coming today for f/u of CKD IIIa, HTN  Doing well , no complaints  No difficulties to urinate  No dysuria/hematuria/flank pain  No edema  CKD IIIa -creat level stable at baseline 1.0-1.2  -to monitor  HTN:BP well controlled, compliant to low Na diet, medications    Review of Systems   Constitutional:  Negative for chills, fever, malaise/fatigue and weight loss.   HENT:  Negative for congestion, hearing loss and sinus pain.    Eyes: Negative.    Respiratory:  Negative for cough, hemoptysis, shortness of breath and wheezing.    Cardiovascular:  Negative for chest pain, palpitations, orthopnea and leg swelling.   Gastrointestinal:  Negative for abdominal pain, diarrhea, nausea and vomiting.   Genitourinary:  Negative for dysuria, flank pain, frequency, hematuria and urgency.   Skin: Negative.    All other systems reviewed and are negative.    Past Medical History:   Diagnosis Date    Abnormal Pap smear of cervix     Dr. Dhaval Franco    CKD (chronic kidney disease), stage III 9/4/2015    COVID-19 11/2020    was on O2 for abouyt 5m    Essential hypertension 9/4/2015    Other specified hypothyroidism 9/3/2015    Tuberculosis     exposed. treated as a child       Family History   Problem Relation Age of Onset    Heart Attack Mother     Alcohol abuse Mother         etoh    Lung Disease Father         smoker    Alcohol abuse Father         etoh    Heart Attack Father     Hypertension Sister     Hyperlipidemia Sister     Lung Disease Sister         smoker    Colon Cancer Sister     Kidney Disease Sister     Lung Disease Maternal Aunt     Heart  Disease Maternal Aunt     Hypertension Maternal Aunt     Hyperlipidemia Maternal Aunt     Stroke Maternal Grandmother     Other Maternal Grandmother         TB of the brain    Stroke Maternal Grandfather     Colon Cancer Paternal Grandmother     No Known Problems Paternal Grandfather     Ovarian Cancer Neg Hx     Tubal Cancer Neg Hx     Peritoneal Cancer Neg Hx     Colorectal Cancer Neg Hx     Breast Cancer Neg Hx     Diabetes Neg Hx        Social History     Socioeconomic History    Marital status:     Number of children: 0   Occupational History    Occupation: retired CNA   Tobacco Use    Smoking status: Former     Current packs/day: 0.00     Average packs/day: 0.3 packs/day for 12.0 years (3.0 ttl pk-yrs)     Types: Cigarettes     Start date: 1965     Quit date: 1977     Years since quittin.5    Smokeless tobacco: Never    Tobacco comments:     Started smoking at age 25 (social smoker)   Vaping Use    Vaping status: Never Used   Substance and Sexual Activity    Alcohol use: No     Alcohol/week: 0.0 oz    Drug use: No    Sexual activity: Never     Partners: Male     Comment: vaginal atrophy-dysparunia     Social Drivers of Health     Food Insecurity: No Food Insecurity (2020)    Hunger Vital Sign     Worried About Running Out of Food in the Last Year: Never true     Ran Out of Food in the Last Year: Never true   Transportation Needs: No Transportation Needs (2020)    PRAPARE - Transportation     Lack of Transportation (Medical): No     Lack of Transportation (Non-Medical): No          Objective:     /68 (BP Location: Right arm, Patient Position: Sitting, BP Cuff Size: Adult)   Pulse 85   Temp 36.1 °C (96.9 °F) (Temporal)   Ht 1.524 m (5')   Wt 108 kg (238 lb)   SpO2 93%   BMI 46.48 kg/m²      Physical Exam  Vitals reviewed.   Constitutional:       General: She is not in acute distress.     Appearance: Normal appearance. She is well-developed. She is not diaphoretic.    HENT:      Head: Normocephalic and atraumatic.      Nose: Nose normal.      Mouth/Throat:      Mouth: Mucous membranes are moist.      Pharynx: Oropharynx is clear.   Eyes:      Extraocular Movements: Extraocular movements intact.      Conjunctiva/sclera: Conjunctivae normal.      Pupils: Pupils are equal, round, and reactive to light.   Cardiovascular:      Rate and Rhythm: Normal rate and regular rhythm.      Pulses: Normal pulses.      Heart sounds: Normal heart sounds.   Pulmonary:      Effort: Pulmonary effort is normal. No respiratory distress.      Breath sounds: Normal breath sounds. No wheezing or rales.   Abdominal:      General: Bowel sounds are normal. There is no distension.      Palpations: Abdomen is soft. There is no mass.      Tenderness: There is no abdominal tenderness. There is no right CVA tenderness or left CVA tenderness.   Musculoskeletal:      Cervical back: Normal range of motion and neck supple.      Right lower leg: No edema.      Left lower leg: No edema.   Skin:     General: Skin is warm.      Coloration: Skin is not pale.      Findings: No erythema or rash.   Neurological:      General: No focal deficit present.      Mental Status: She is alert and oriented to person, place, and time.      Cranial Nerves: No cranial nerve deficit.      Coordination: Coordination normal.   Psychiatric:         Mood and Affect: Mood normal.         Behavior: Behavior normal.         Thought Content: Thought content normal.         Judgment: Judgment normal.               Laboratory results reviewed; d/w pt   Latest Reference Range & Units 11/21/24 07:16 04/09/25 07:40 06/26/25 06:56   WBC 4.8 - 10.8 K/uL 9.9 9.3 9.1   RBC 4.20 - 5.40 M/uL 4.68 4.24 4.18 (L)   Hemoglobin 12.0 - 16.0 g/dL 14.1 13.2 12.8   Hematocrit 37.0 - 47.0 % 44.2 40.9 39.8   MCV 81.4 - 97.8 fL 94.4 96.5 95.2   MCH 27.0 - 33.0 pg 30.1 31.1 30.6   MCHC 32.2 - 35.5 g/dL 31.9 (L) 32.3 32.2   RDW 35.9 - 50.0 fL 49.3 54.3 (H) 49.4    Platelet Count 164 - 446 K/uL 254 243 259   MPV 9.0 - 12.9 fL 9.6 9.5 9.5   Sodium 135 - 145 mmol/L 138 143 141   Potassium 3.6 - 5.5 mmol/L 4.1 4.8 4.2   Chloride 96 - 112 mmol/L 105 107 105   Co2 20 - 33 mmol/L 20 22 23   Anion Gap 7.0 - 16.0  13.0 14.0 13.0   Glucose 65 - 99 mg/dL 112 (H) 102 (H) 95   Bun 8 - 22 mg/dL 21 21 21   Creatinine 0.50 - 1.40 mg/dL 1.04 0.98 1.16   GFR (CKD-EPI) >60 mL/min/1.73 m 2 54 ! 57 ! 47 !   Calcium 8.5 - 10.5 mg/dL 8.9 9.2 9.3   Correct Calcium 8.5 - 10.5 mg/dL  9.4    AST(SGOT) 12 - 45 U/L  22    ALT(SGPT) 2 - 50 U/L  14    Alkaline Phosphatase 30 - 99 U/L  126 (H)    Total Bilirubin 0.1 - 1.5 mg/dL  0.5    Albumin 3.2 - 4.9 g/dL  3.7    Total Protein 6.0 - 8.2 g/dL  6.9    Globulin 1.9 - 3.5 g/dL  3.2    A-G Ratio g/dL  1.2    Glycohemoglobin 4.0 - 5.6 %  5.9 (H)    Estim. Avg Glu mg/dL  123    Cholesterol,Tot 100 - 199 mg/dL  118    Triglycerides 0 - 149 mg/dL  58    HDL >=40 mg/dL  64    LDL <100 mg/dL  42    Micro Alb Creat Ratio 0 - 30 mg/g 39 (H)     Creatinine, Urine mg/dL 253.57     Microalbumin, Urine Random mg/dL 9.8     Urobilinogen, Urine <=1.0 EU/dL   0.2   Color    Yellow   Character    Clear   Specific Gravity <1.035    1.016   Ph 5.0 - 8.0    6.5   Glucose Negative mg/dL   Negative   Ketones Negative mg/dL   Negative   Bilirubin Negative    Negative   Occult Blood Negative    Negative   Protein Negative mg/dL   30 !   Nitrite Negative    Negative   Leukocyte Esterase Negative    Trace !   Micro Urine Req    Microscopic   WBC /hpf   3-5   RBC 0 - 2 /hpf   0-2   Epithelial Cells 0 - 5 /hpf   0-2   Bacteria None /hpf   None Seen   Urine Casts 0 - 2 /lpf   0-2   25-Hydroxy   Vitamin D 25 30 - 100 ng/mL  59 67   TSH 0.380 - 5.330 uIU/mL  3.050    (L): Data is abnormally low  (H): Data is abnormally high  !: Data is abnormal      Assessment/Plan:     1.  CKD IIIa       Creat level stable at baseline -to monitor       Keep well hydrated    2. Essential hypertension       BP well controlled      Continue current treatment    3. Vitamin D deficiency disease      Vit D level and PTH well controlled WNL    4. Anemia, unspecified type      Hb stable -WNL    5. Microalbuminuria:- very mild    Recs: continue current treatment,             low salt diet,             keep well hydrated,             avoid NSAID's,                          F/u in 6 months

## 2025-08-11 ENCOUNTER — OFFICE VISIT (OUTPATIENT)
Dept: MEDICAL GROUP | Facility: PHYSICIAN GROUP | Age: 85
End: 2025-08-11
Payer: MEDICARE

## 2025-08-11 VITALS
DIASTOLIC BLOOD PRESSURE: 86 MMHG | WEIGHT: 241.8 LBS | SYSTOLIC BLOOD PRESSURE: 122 MMHG | BODY MASS INDEX: 47.47 KG/M2 | OXYGEN SATURATION: 92 % | TEMPERATURE: 97.6 F | RESPIRATION RATE: 18 BRPM | HEART RATE: 76 BPM | HEIGHT: 60 IN

## 2025-08-11 DIAGNOSIS — I10 PRIMARY HYPERTENSION: ICD-10-CM

## 2025-08-11 DIAGNOSIS — E03.9 ACQUIRED HYPOTHYROIDISM: Primary | ICD-10-CM

## 2025-08-11 DIAGNOSIS — E78.5 DYSLIPIDEMIA: ICD-10-CM

## 2025-08-11 PROCEDURE — 99214 OFFICE O/P EST MOD 30 MIN: CPT

## 2025-08-11 PROCEDURE — 3074F SYST BP LT 130 MM HG: CPT

## 2025-08-11 PROCEDURE — 3079F DIAST BP 80-89 MM HG: CPT

## 2025-08-11 ASSESSMENT — FIBROSIS 4 INDEX: FIB4 SCORE: 1.86
